# Patient Record
Sex: MALE | Race: BLACK OR AFRICAN AMERICAN | Employment: OTHER | URBAN - METROPOLITAN AREA
[De-identification: names, ages, dates, MRNs, and addresses within clinical notes are randomized per-mention and may not be internally consistent; named-entity substitution may affect disease eponyms.]

---

## 2017-02-01 DIAGNOSIS — I10 ESSENTIAL HYPERTENSION: ICD-10-CM

## 2017-02-02 RX ORDER — METOPROLOL SUCCINATE 50 MG/1
TABLET, EXTENDED RELEASE ORAL
Qty: 30 TABLET | Refills: 3 | Status: SHIPPED | OUTPATIENT
Start: 2017-02-02 | End: 2017-05-30 | Stop reason: SDUPTHER

## 2017-03-28 ENCOUNTER — OFFICE VISIT (OUTPATIENT)
Dept: INTERNAL MEDICINE CLINIC | Age: 82
End: 2017-03-28

## 2017-03-28 VITALS
HEIGHT: 69 IN | TEMPERATURE: 98.1 F | OXYGEN SATURATION: 98 % | HEART RATE: 63 BPM | BODY MASS INDEX: 22.96 KG/M2 | WEIGHT: 155 LBS | SYSTOLIC BLOOD PRESSURE: 106 MMHG | DIASTOLIC BLOOD PRESSURE: 60 MMHG

## 2017-03-28 DIAGNOSIS — I10 ESSENTIAL HYPERTENSION: ICD-10-CM

## 2017-03-28 DIAGNOSIS — Z23 NEED FOR VACCINATION: ICD-10-CM

## 2017-03-28 DIAGNOSIS — K20.90 ESOPHAGITIS: ICD-10-CM

## 2017-03-28 DIAGNOSIS — E78.00 PURE HYPERCHOLESTEROLEMIA: Primary | ICD-10-CM

## 2017-03-28 LAB
A/G RATIO: 1.4 (ref 1.1–2.2)
ALBUMIN SERPL-MCNC: 3.9 G/DL (ref 3.4–5)
ALP BLD-CCNC: 61 U/L (ref 40–129)
ALT SERPL-CCNC: 9 U/L (ref 10–40)
ANION GAP SERPL CALCULATED.3IONS-SCNC: 14 MMOL/L (ref 3–16)
AST SERPL-CCNC: 18 U/L (ref 15–37)
BILIRUB SERPL-MCNC: <0.2 MG/DL (ref 0–1)
BUN BLDV-MCNC: 17 MG/DL (ref 7–20)
CALCIUM SERPL-MCNC: 9.3 MG/DL (ref 8.3–10.6)
CHLORIDE BLD-SCNC: 102 MMOL/L (ref 99–110)
CO2: 26 MMOL/L (ref 21–32)
CREAT SERPL-MCNC: 1.1 MG/DL (ref 0.8–1.3)
GFR AFRICAN AMERICAN: >60
GFR NON-AFRICAN AMERICAN: >60
GLOBULIN: 2.7 G/DL
GLUCOSE BLD-MCNC: 85 MG/DL (ref 70–99)
POTASSIUM SERPL-SCNC: 4.9 MMOL/L (ref 3.5–5.1)
SODIUM BLD-SCNC: 142 MMOL/L (ref 136–145)
TOTAL PROTEIN: 6.6 G/DL (ref 6.4–8.2)

## 2017-03-28 PROCEDURE — G8484 FLU IMMUNIZE NO ADMIN: HCPCS | Performed by: INTERNAL MEDICINE

## 2017-03-28 PROCEDURE — 90670 PCV13 VACCINE IM: CPT | Performed by: INTERNAL MEDICINE

## 2017-03-28 PROCEDURE — 1123F ACP DISCUSS/DSCN MKR DOCD: CPT | Performed by: INTERNAL MEDICINE

## 2017-03-28 PROCEDURE — 4040F PNEUMOC VAC/ADMIN/RCVD: CPT | Performed by: INTERNAL MEDICINE

## 2017-03-28 PROCEDURE — 99214 OFFICE O/P EST MOD 30 MIN: CPT | Performed by: INTERNAL MEDICINE

## 2017-03-28 PROCEDURE — G8419 CALC BMI OUT NRM PARAM NOF/U: HCPCS | Performed by: INTERNAL MEDICINE

## 2017-03-28 PROCEDURE — G8427 DOCREV CUR MEDS BY ELIG CLIN: HCPCS | Performed by: INTERNAL MEDICINE

## 2017-03-28 PROCEDURE — G0009 ADMIN PNEUMOCOCCAL VACCINE: HCPCS | Performed by: INTERNAL MEDICINE

## 2017-03-28 PROCEDURE — 1036F TOBACCO NON-USER: CPT | Performed by: INTERNAL MEDICINE

## 2017-03-28 ASSESSMENT — ENCOUNTER SYMPTOMS
BACK PAIN: 0
EYES NEGATIVE: 1
BLOATING: 0
CONSTIPATION: 1
RESPIRATORY NEGATIVE: 1

## 2017-03-28 ASSESSMENT — PATIENT HEALTH QUESTIONNAIRE - PHQ9
1. LITTLE INTEREST OR PLEASURE IN DOING THINGS: 0
SUM OF ALL RESPONSES TO PHQ9 QUESTIONS 1 & 2: 0
SUM OF ALL RESPONSES TO PHQ QUESTIONS 1-9: 0
2. FEELING DOWN, DEPRESSED OR HOPELESS: 0

## 2017-03-31 PROCEDURE — 90670 PCV13 VACCINE IM: CPT | Performed by: INTERNAL MEDICINE

## 2017-03-31 PROCEDURE — G0009 ADMIN PNEUMOCOCCAL VACCINE: HCPCS | Performed by: INTERNAL MEDICINE

## 2017-04-13 ENCOUNTER — TELEPHONE (OUTPATIENT)
Dept: INTERNAL MEDICINE CLINIC | Age: 82
End: 2017-04-13

## 2017-04-13 DIAGNOSIS — H61.23 BILATERAL IMPACTED CERUMEN: Primary | ICD-10-CM

## 2017-04-24 ENCOUNTER — OFFICE VISIT (OUTPATIENT)
Dept: ENT CLINIC | Age: 82
End: 2017-04-24

## 2017-04-24 VITALS
HEIGHT: 69 IN | TEMPERATURE: 97.7 F | WEIGHT: 153.8 LBS | HEART RATE: 73 BPM | DIASTOLIC BLOOD PRESSURE: 61 MMHG | SYSTOLIC BLOOD PRESSURE: 126 MMHG | BODY MASS INDEX: 22.78 KG/M2

## 2017-04-24 DIAGNOSIS — H90.5 HEARING LOSS, SENSORINEURAL: Primary | ICD-10-CM

## 2017-04-24 PROCEDURE — 1123F ACP DISCUSS/DSCN MKR DOCD: CPT | Performed by: OTOLARYNGOLOGY

## 2017-04-24 PROCEDURE — G8427 DOCREV CUR MEDS BY ELIG CLIN: HCPCS | Performed by: OTOLARYNGOLOGY

## 2017-04-24 PROCEDURE — G8419 CALC BMI OUT NRM PARAM NOF/U: HCPCS | Performed by: OTOLARYNGOLOGY

## 2017-04-24 PROCEDURE — 4040F PNEUMOC VAC/ADMIN/RCVD: CPT | Performed by: OTOLARYNGOLOGY

## 2017-04-24 PROCEDURE — 1036F TOBACCO NON-USER: CPT | Performed by: OTOLARYNGOLOGY

## 2017-04-24 PROCEDURE — 99203 OFFICE O/P NEW LOW 30 MIN: CPT | Performed by: OTOLARYNGOLOGY

## 2017-05-30 DIAGNOSIS — I10 ESSENTIAL HYPERTENSION: ICD-10-CM

## 2017-05-30 RX ORDER — METOPROLOL SUCCINATE 50 MG/1
TABLET, EXTENDED RELEASE ORAL
Qty: 30 TABLET | Refills: 2 | Status: SHIPPED | OUTPATIENT
Start: 2017-05-30 | End: 2017-08-26 | Stop reason: SDUPTHER

## 2017-06-27 ENCOUNTER — OFFICE VISIT (OUTPATIENT)
Dept: INTERNAL MEDICINE CLINIC | Age: 82
End: 2017-06-27

## 2017-06-27 VITALS
HEART RATE: 71 BPM | HEIGHT: 69 IN | WEIGHT: 153 LBS | OXYGEN SATURATION: 97 % | TEMPERATURE: 98.8 F | SYSTOLIC BLOOD PRESSURE: 138 MMHG | BODY MASS INDEX: 22.66 KG/M2 | DIASTOLIC BLOOD PRESSURE: 68 MMHG

## 2017-06-27 DIAGNOSIS — Z23 NEED FOR PROPHYLACTIC VACCINATION AGAINST DIPHTHERIA-TETANUS-PERTUSSIS (DTP): Primary | ICD-10-CM

## 2017-06-27 DIAGNOSIS — E78.00 PURE HYPERCHOLESTEROLEMIA: ICD-10-CM

## 2017-06-27 DIAGNOSIS — I10 ESSENTIAL HYPERTENSION: ICD-10-CM

## 2017-06-27 DIAGNOSIS — I48.0 PAROXYSMAL ATRIAL FIBRILLATION (HCC): ICD-10-CM

## 2017-06-27 LAB
A/G RATIO: 1.3 (ref 1.1–2.2)
ALBUMIN SERPL-MCNC: 4.3 G/DL (ref 3.4–5)
ALP BLD-CCNC: 71 U/L (ref 40–129)
ALT SERPL-CCNC: 10 U/L (ref 10–40)
ANION GAP SERPL CALCULATED.3IONS-SCNC: 12 MMOL/L (ref 3–16)
AST SERPL-CCNC: 18 U/L (ref 15–37)
BILIRUB SERPL-MCNC: <0.2 MG/DL (ref 0–1)
BUN BLDV-MCNC: 17 MG/DL (ref 7–20)
CALCIUM SERPL-MCNC: 10.2 MG/DL (ref 8.3–10.6)
CHLORIDE BLD-SCNC: 99 MMOL/L (ref 99–110)
CHOLESTEROL, TOTAL: 191 MG/DL (ref 0–199)
CO2: 28 MMOL/L (ref 21–32)
CREAT SERPL-MCNC: 1.1 MG/DL (ref 0.8–1.3)
GFR AFRICAN AMERICAN: >60
GFR NON-AFRICAN AMERICAN: >60
GLOBULIN: 3.2 G/DL
GLUCOSE BLD-MCNC: 96 MG/DL (ref 70–99)
HDLC SERPL-MCNC: 81 MG/DL (ref 40–60)
LDL CHOLESTEROL CALCULATED: 96 MG/DL
POTASSIUM SERPL-SCNC: 5.6 MMOL/L (ref 3.5–5.1)
SODIUM BLD-SCNC: 139 MMOL/L (ref 136–145)
TOTAL PROTEIN: 7.5 G/DL (ref 6.4–8.2)
TRIGL SERPL-MCNC: 69 MG/DL (ref 0–150)
VLDLC SERPL CALC-MCNC: 14 MG/DL

## 2017-06-27 PROCEDURE — 90471 IMMUNIZATION ADMIN: CPT | Performed by: INTERNAL MEDICINE

## 2017-06-27 PROCEDURE — G8427 DOCREV CUR MEDS BY ELIG CLIN: HCPCS | Performed by: INTERNAL MEDICINE

## 2017-06-27 PROCEDURE — G8420 CALC BMI NORM PARAMETERS: HCPCS | Performed by: INTERNAL MEDICINE

## 2017-06-27 PROCEDURE — 99214 OFFICE O/P EST MOD 30 MIN: CPT | Performed by: INTERNAL MEDICINE

## 2017-06-27 PROCEDURE — 4040F PNEUMOC VAC/ADMIN/RCVD: CPT | Performed by: INTERNAL MEDICINE

## 2017-06-27 PROCEDURE — 90715 TDAP VACCINE 7 YRS/> IM: CPT | Performed by: INTERNAL MEDICINE

## 2017-06-27 PROCEDURE — 1036F TOBACCO NON-USER: CPT | Performed by: INTERNAL MEDICINE

## 2017-06-27 PROCEDURE — 1123F ACP DISCUSS/DSCN MKR DOCD: CPT | Performed by: INTERNAL MEDICINE

## 2017-06-27 ASSESSMENT — ENCOUNTER SYMPTOMS
EYES NEGATIVE: 1
RESPIRATORY NEGATIVE: 1
CONSTIPATION: 1

## 2017-07-31 ENCOUNTER — HOSPITAL ENCOUNTER (OUTPATIENT)
Dept: NON INVASIVE DIAGNOSTICS | Age: 82
Discharge: HOME | End: 2017-07-31
Attending: INTERNAL MEDICINE | Admitting: INTERNAL MEDICINE

## 2017-07-31 DIAGNOSIS — R06.09 OTHER FORMS OF DYSPNEA: ICD-10-CM

## 2017-07-31 LAB
LV EF: 78 %
LVEF MODALITY: NORMAL

## 2017-08-26 DIAGNOSIS — I10 ESSENTIAL HYPERTENSION: ICD-10-CM

## 2017-08-29 RX ORDER — METOPROLOL SUCCINATE 50 MG/1
TABLET, EXTENDED RELEASE ORAL
Qty: 30 TABLET | Refills: 1 | Status: SHIPPED | OUTPATIENT
Start: 2017-08-29 | End: 2017-10-27 | Stop reason: SDUPTHER

## 2017-09-27 ENCOUNTER — OFFICE VISIT (OUTPATIENT)
Dept: INTERNAL MEDICINE CLINIC | Age: 82
End: 2017-09-27

## 2017-09-27 VITALS
WEIGHT: 154 LBS | DIASTOLIC BLOOD PRESSURE: 72 MMHG | SYSTOLIC BLOOD PRESSURE: 120 MMHG | HEIGHT: 69 IN | OXYGEN SATURATION: 98 % | HEART RATE: 69 BPM | BODY MASS INDEX: 22.81 KG/M2

## 2017-09-27 DIAGNOSIS — Z23 NEED FOR INFLUENZA VACCINATION: Primary | ICD-10-CM

## 2017-09-27 DIAGNOSIS — J98.4 RESTRICTIVE AIRWAY DISEASE: ICD-10-CM

## 2017-09-27 PROCEDURE — 1036F TOBACCO NON-USER: CPT | Performed by: INTERNAL MEDICINE

## 2017-09-27 PROCEDURE — 1123F ACP DISCUSS/DSCN MKR DOCD: CPT | Performed by: INTERNAL MEDICINE

## 2017-09-27 PROCEDURE — 4040F PNEUMOC VAC/ADMIN/RCVD: CPT | Performed by: INTERNAL MEDICINE

## 2017-09-27 PROCEDURE — 99214 OFFICE O/P EST MOD 30 MIN: CPT | Performed by: INTERNAL MEDICINE

## 2017-09-27 PROCEDURE — G8427 DOCREV CUR MEDS BY ELIG CLIN: HCPCS | Performed by: INTERNAL MEDICINE

## 2017-09-27 PROCEDURE — G0008 ADMIN INFLUENZA VIRUS VAC: HCPCS | Performed by: INTERNAL MEDICINE

## 2017-09-27 PROCEDURE — G8420 CALC BMI NORM PARAMETERS: HCPCS | Performed by: INTERNAL MEDICINE

## 2017-09-27 PROCEDURE — 90662 IIV NO PRSV INCREASED AG IM: CPT | Performed by: INTERNAL MEDICINE

## 2017-09-27 ASSESSMENT — ENCOUNTER SYMPTOMS
BLURRED VISION: 0
GASTROINTESTINAL NEGATIVE: 1
EYES NEGATIVE: 1
SHORTNESS OF BREATH: 1

## 2017-10-27 DIAGNOSIS — I10 ESSENTIAL HYPERTENSION: ICD-10-CM

## 2017-10-30 RX ORDER — METOPROLOL SUCCINATE 50 MG/1
TABLET, EXTENDED RELEASE ORAL
Qty: 30 TABLET | Refills: 0 | Status: SHIPPED | OUTPATIENT
Start: 2017-10-30 | End: 2017-11-26 | Stop reason: SDUPTHER

## 2017-11-26 DIAGNOSIS — I10 ESSENTIAL HYPERTENSION: ICD-10-CM

## 2017-11-27 RX ORDER — METOPROLOL SUCCINATE 50 MG/1
TABLET, EXTENDED RELEASE ORAL
Qty: 30 TABLET | Refills: 0 | Status: SHIPPED | OUTPATIENT
Start: 2017-11-27 | End: 2017-12-26 | Stop reason: SDUPTHER

## 2017-12-18 ENCOUNTER — OFFICE VISIT (OUTPATIENT)
Dept: INTERNAL MEDICINE CLINIC | Age: 82
End: 2017-12-18

## 2017-12-18 VITALS
DIASTOLIC BLOOD PRESSURE: 64 MMHG | WEIGHT: 155 LBS | HEART RATE: 75 BPM | BODY MASS INDEX: 22.96 KG/M2 | OXYGEN SATURATION: 97 % | SYSTOLIC BLOOD PRESSURE: 138 MMHG | HEIGHT: 69 IN

## 2017-12-18 DIAGNOSIS — M54.2 NECK PAIN: ICD-10-CM

## 2017-12-18 DIAGNOSIS — J98.4 RESTRICTIVE AIRWAY DISEASE: Primary | ICD-10-CM

## 2017-12-18 DIAGNOSIS — S29.9XXA INJURY OF CHEST WALL, INITIAL ENCOUNTER: ICD-10-CM

## 2017-12-18 LAB
A/G RATIO: 1.6 (ref 1.1–2.2)
ALBUMIN SERPL-MCNC: 4.3 G/DL (ref 3.4–5)
ALP BLD-CCNC: 58 U/L (ref 40–129)
ALT SERPL-CCNC: 13 U/L (ref 10–40)
ANION GAP SERPL CALCULATED.3IONS-SCNC: 14 MMOL/L (ref 3–16)
AST SERPL-CCNC: 24 U/L (ref 15–37)
BILIRUB SERPL-MCNC: 0.5 MG/DL (ref 0–1)
BUN BLDV-MCNC: 16 MG/DL (ref 7–20)
CALCIUM SERPL-MCNC: 9.4 MG/DL (ref 8.3–10.6)
CHLORIDE BLD-SCNC: 100 MMOL/L (ref 99–110)
CO2: 26 MMOL/L (ref 21–32)
CREAT SERPL-MCNC: 1 MG/DL (ref 0.8–1.3)
GFR AFRICAN AMERICAN: >60
GFR NON-AFRICAN AMERICAN: >60
GLOBULIN: 2.7 G/DL
GLUCOSE BLD-MCNC: 100 MG/DL (ref 70–99)
HCT VFR BLD CALC: 32 % (ref 40.5–52.5)
HEMOGLOBIN: 10.7 G/DL (ref 13.5–17.5)
MCH RBC QN AUTO: 31.7 PG (ref 26–34)
MCHC RBC AUTO-ENTMCNC: 33.4 G/DL (ref 31–36)
MCV RBC AUTO: 94.7 FL (ref 80–100)
PDW BLD-RTO: 14.3 % (ref 12.4–15.4)
PLATELET # BLD: 191 K/UL (ref 135–450)
PMV BLD AUTO: 9.2 FL (ref 5–10.5)
POTASSIUM SERPL-SCNC: 5.5 MMOL/L (ref 3.5–5.1)
RBC # BLD: 3.38 M/UL (ref 4.2–5.9)
SODIUM BLD-SCNC: 140 MMOL/L (ref 136–145)
TOTAL PROTEIN: 7 G/DL (ref 6.4–8.2)
WBC # BLD: 6.9 K/UL (ref 4–11)

## 2017-12-18 PROCEDURE — 99214 OFFICE O/P EST MOD 30 MIN: CPT | Performed by: INTERNAL MEDICINE

## 2017-12-18 PROCEDURE — 96372 THER/PROPH/DIAG INJ SC/IM: CPT | Performed by: INTERNAL MEDICINE

## 2017-12-18 PROCEDURE — G8484 FLU IMMUNIZE NO ADMIN: HCPCS | Performed by: INTERNAL MEDICINE

## 2017-12-18 PROCEDURE — 1123F ACP DISCUSS/DSCN MKR DOCD: CPT | Performed by: INTERNAL MEDICINE

## 2017-12-18 PROCEDURE — 1036F TOBACCO NON-USER: CPT | Performed by: INTERNAL MEDICINE

## 2017-12-18 PROCEDURE — G8427 DOCREV CUR MEDS BY ELIG CLIN: HCPCS | Performed by: INTERNAL MEDICINE

## 2017-12-18 PROCEDURE — G8420 CALC BMI NORM PARAMETERS: HCPCS | Performed by: INTERNAL MEDICINE

## 2017-12-18 PROCEDURE — 4040F PNEUMOC VAC/ADMIN/RCVD: CPT | Performed by: INTERNAL MEDICINE

## 2017-12-18 ASSESSMENT — ENCOUNTER SYMPTOMS
EYES NEGATIVE: 1
GASTROINTESTINAL NEGATIVE: 1
CHEST TIGHTNESS: 1

## 2017-12-18 NOTE — PROGRESS NOTES
Subjective:      Patient ID: Radha Isaac is a 80 y.o. male. Chest Pain    This is a new problem. The current episode started in the past 7 days. The problem occurs daily. The problem has been unchanged. The pain is present in the lateral region. The pain is at a severity of 4/10. His past medical history is significant for hyperlipidemia. Hypertension   This is a chronic problem. The problem is controlled. Associated symptoms include chest pain. Past treatments include lifestyle changes. The current treatment provides moderate improvement. There are no compliance problems. Hyperlipidemia   This is a chronic problem. The problem is controlled. Recent lipid tests were reviewed and are normal. Associated symptoms include chest pain. Current antihyperlipidemic treatment includes exercise. The current treatment provides significant improvement of lipids. Review of Systems   Constitutional: Negative. HENT: Negative. Eyes: Negative. Respiratory: Positive for chest tightness. Cardiovascular: Positive for chest pain. Gastrointestinal: Negative. Genitourinary: Negative. Musculoskeletal: Negative. Skin: Negative. Neurological: Negative. Psychiatric/Behavioral: Negative. Objective:   Physical Exam   Constitutional: He is oriented to person, place, and time. He appears well-developed and well-nourished. HENT:   Head: Normocephalic and atraumatic. Left Ear: External ear normal.   Eyes: Conjunctivae and EOM are normal. Pupils are equal, round, and reactive to light. Neck: Normal range of motion. Neck supple. No thyromegaly present. Cardiovascular: Normal rate, regular rhythm and normal heart sounds. No murmur heard. Pulmonary/Chest: Effort normal and breath sounds normal. No respiratory distress. He has no wheezes. He has no rales. Abdominal: Soft. Bowel sounds are normal.   Musculoskeletal: Normal range of motion.    Neurological: He is alert and oriented

## 2018-03-24 DIAGNOSIS — I10 ESSENTIAL HYPERTENSION: ICD-10-CM

## 2018-03-26 ENCOUNTER — TELEPHONE (OUTPATIENT)
Dept: INTERNAL MEDICINE CLINIC | Age: 83
End: 2018-03-26

## 2018-03-26 RX ORDER — METOPROLOL SUCCINATE 50 MG/1
TABLET, EXTENDED RELEASE ORAL
Qty: 90 TABLET | Refills: 2 | Status: ON HOLD | OUTPATIENT
Start: 2018-03-26 | End: 2018-07-07 | Stop reason: HOSPADM

## 2018-04-05 ENCOUNTER — OFFICE VISIT (OUTPATIENT)
Dept: INTERNAL MEDICINE CLINIC | Age: 83
End: 2018-04-05

## 2018-04-05 VITALS
WEIGHT: 152 LBS | HEIGHT: 69 IN | DIASTOLIC BLOOD PRESSURE: 50 MMHG | BODY MASS INDEX: 22.51 KG/M2 | OXYGEN SATURATION: 98 % | HEART RATE: 70 BPM | SYSTOLIC BLOOD PRESSURE: 120 MMHG

## 2018-04-05 DIAGNOSIS — E78.00 PURE HYPERCHOLESTEROLEMIA: ICD-10-CM

## 2018-04-05 DIAGNOSIS — M79.641 BILATERAL HAND PAIN: ICD-10-CM

## 2018-04-05 DIAGNOSIS — I10 ESSENTIAL HYPERTENSION: ICD-10-CM

## 2018-04-05 DIAGNOSIS — D50.0 IRON DEFICIENCY ANEMIA DUE TO CHRONIC BLOOD LOSS: Primary | ICD-10-CM

## 2018-04-05 DIAGNOSIS — C61 PROSTATE CANCER (HCC): ICD-10-CM

## 2018-04-05 DIAGNOSIS — M79.642 BILATERAL HAND PAIN: ICD-10-CM

## 2018-04-05 LAB
A/G RATIO: 1.6 (ref 1.1–2.2)
ALBUMIN SERPL-MCNC: 4.2 G/DL (ref 3.4–5)
ALP BLD-CCNC: 70 U/L (ref 40–129)
ALT SERPL-CCNC: 9 U/L (ref 10–40)
ANION GAP SERPL CALCULATED.3IONS-SCNC: 13 MMOL/L (ref 3–16)
AST SERPL-CCNC: 19 U/L (ref 15–37)
BILIRUB SERPL-MCNC: <0.2 MG/DL (ref 0–1)
BILIRUBIN URINE: NEGATIVE
BLOOD, URINE: NEGATIVE
BUN BLDV-MCNC: 19 MG/DL (ref 7–20)
CALCIUM SERPL-MCNC: 9.1 MG/DL (ref 8.3–10.6)
CHLORIDE BLD-SCNC: 99 MMOL/L (ref 99–110)
CHOLESTEROL, TOTAL: 178 MG/DL (ref 0–199)
CLARITY: CLEAR
CO2: 26 MMOL/L (ref 21–32)
COLOR: YELLOW
CREAT SERPL-MCNC: 1.3 MG/DL (ref 0.8–1.3)
GFR AFRICAN AMERICAN: >60
GFR NON-AFRICAN AMERICAN: 52
GLOBULIN: 2.7 G/DL
GLUCOSE BLD-MCNC: 81 MG/DL (ref 70–99)
GLUCOSE URINE: NEGATIVE MG/DL
HDLC SERPL-MCNC: 73 MG/DL (ref 40–60)
KETONES, URINE: NEGATIVE MG/DL
LDL CHOLESTEROL CALCULATED: 88 MG/DL
LEUKOCYTE ESTERASE, URINE: NEGATIVE
MICROSCOPIC EXAMINATION: NORMAL
NITRITE, URINE: NEGATIVE
PH UA: 7
POTASSIUM SERPL-SCNC: 4.7 MMOL/L (ref 3.5–5.1)
PROTEIN UA: NEGATIVE MG/DL
SODIUM BLD-SCNC: 138 MMOL/L (ref 136–145)
SPECIFIC GRAVITY UA: 1.02
TOTAL PROTEIN: 6.9 G/DL (ref 6.4–8.2)
TRIGL SERPL-MCNC: 83 MG/DL (ref 0–150)
URINE TYPE: NORMAL
UROBILINOGEN, URINE: 0.2 E.U./DL
VLDLC SERPL CALC-MCNC: 17 MG/DL

## 2018-04-05 PROCEDURE — G8420 CALC BMI NORM PARAMETERS: HCPCS | Performed by: INTERNAL MEDICINE

## 2018-04-05 PROCEDURE — 1036F TOBACCO NON-USER: CPT | Performed by: INTERNAL MEDICINE

## 2018-04-05 PROCEDURE — 99214 OFFICE O/P EST MOD 30 MIN: CPT | Performed by: INTERNAL MEDICINE

## 2018-04-05 PROCEDURE — 1123F ACP DISCUSS/DSCN MKR DOCD: CPT | Performed by: INTERNAL MEDICINE

## 2018-04-05 PROCEDURE — G8427 DOCREV CUR MEDS BY ELIG CLIN: HCPCS | Performed by: INTERNAL MEDICINE

## 2018-04-05 PROCEDURE — 4040F PNEUMOC VAC/ADMIN/RCVD: CPT | Performed by: INTERNAL MEDICINE

## 2018-04-05 ASSESSMENT — ENCOUNTER SYMPTOMS
EYES NEGATIVE: 1
GASTROINTESTINAL NEGATIVE: 1
RESPIRATORY NEGATIVE: 1

## 2018-07-06 PROBLEM — R00.1 SYMPTOMATIC BRADYCARDIA: Status: ACTIVE | Noted: 2018-07-06

## 2018-07-08 ENCOUNTER — CARE COORDINATION (OUTPATIENT)
Dept: CASE MANAGEMENT | Age: 83
End: 2018-07-08

## 2018-07-08 DIAGNOSIS — R55 SYNCOPE AND COLLAPSE: Primary | ICD-10-CM

## 2018-07-08 PROCEDURE — 1111F DSCHRG MED/CURRENT MED MERGE: CPT | Performed by: INTERNAL MEDICINE

## 2018-07-08 NOTE — CARE COORDINATION
[facility] Transitions Initial Follow Up Call    Call within 2 business days of discharge: Yes    Patient: Leandro Cates Patient : 1926   MRN: <[id number]>  Reason for Admission: There are no discharge diagnoses documented for the most recent discharge. Discharge Date: 18 RARS: Readmission Risk Score: 9     Spoke with: [address]: WEST    Non-face-to-face services provided:  Obtained and reviewed discharge summary and/or continuity of care documents    Care Transitions 24 Hour Call    Schedule Follow Up Appointment with PCP:  Declined  Do you have any ongoing symptoms?:  No  Do you have a copy of your discharge instructions?:  Yes  Do you have all of your prescriptions and are they filled?:  Yes  Have you been contacted by a [facility] Pharmacist?:  No  Have you scheduled your follow up appointment?:  No  Were you discharged with any Home Care or Post Acute Services:  No  Do you feel like you have everything you need to keep you well at home?:  Yes  Care Transitions Interventions  No Identified Needs       Pt states doing well, no issues or concerns. No further episodes of lightheadness or falling. Reviewed all meds.  Pt states he will make his own f/u appt, politely declined this nurse's assistance  Agreed to more CTC f/u calls      Follow Up  Future Appointments  Date Time Provider Sue Li   2018 9:40 AM MD Carole Sharma RN

## 2018-07-09 ENCOUNTER — OFFICE VISIT (OUTPATIENT)
Dept: CARDIOLOGY CLINIC | Age: 83
End: 2018-07-09

## 2018-07-09 ENCOUNTER — TELEPHONE (OUTPATIENT)
Dept: CARDIOLOGY CLINIC | Age: 83
End: 2018-07-09

## 2018-07-09 VITALS
WEIGHT: 152 LBS | SYSTOLIC BLOOD PRESSURE: 100 MMHG | DIASTOLIC BLOOD PRESSURE: 58 MMHG | HEART RATE: 78 BPM | OXYGEN SATURATION: 98 % | HEIGHT: 69 IN | BODY MASS INDEX: 22.51 KG/M2

## 2018-07-09 DIAGNOSIS — I10 ESSENTIAL HYPERTENSION: ICD-10-CM

## 2018-07-09 DIAGNOSIS — R55 SYNCOPE AND COLLAPSE: ICD-10-CM

## 2018-07-09 DIAGNOSIS — E78.2 MIXED HYPERLIPIDEMIA: ICD-10-CM

## 2018-07-09 DIAGNOSIS — R00.1 SYMPTOMATIC BRADYCARDIA: Primary | ICD-10-CM

## 2018-07-09 PROCEDURE — 1111F DSCHRG MED/CURRENT MED MERGE: CPT | Performed by: INTERNAL MEDICINE

## 2018-07-09 PROCEDURE — 4040F PNEUMOC VAC/ADMIN/RCVD: CPT | Performed by: INTERNAL MEDICINE

## 2018-07-09 PROCEDURE — 1036F TOBACCO NON-USER: CPT | Performed by: INTERNAL MEDICINE

## 2018-07-09 PROCEDURE — G8427 DOCREV CUR MEDS BY ELIG CLIN: HCPCS | Performed by: INTERNAL MEDICINE

## 2018-07-09 PROCEDURE — 1123F ACP DISCUSS/DSCN MKR DOCD: CPT | Performed by: INTERNAL MEDICINE

## 2018-07-09 PROCEDURE — 99214 OFFICE O/P EST MOD 30 MIN: CPT | Performed by: INTERNAL MEDICINE

## 2018-07-09 PROCEDURE — G8420 CALC BMI NORM PARAMETERS: HCPCS | Performed by: INTERNAL MEDICINE

## 2018-07-09 PROCEDURE — 93000 ELECTROCARDIOGRAM COMPLETE: CPT | Performed by: INTERNAL MEDICINE

## 2018-07-09 NOTE — PATIENT INSTRUCTIONS
about \"Lightheadedness or Faintness: Care Instructions. \"     If you do not have an account, please click on the \"Sign Up Now\" link. Current as of: November 20, 2017  Content Version: 11.6  © 1901-7648 [employer]. Care instructions adapted under license by [facility] ([facility]). If you have questions about a medical condition or this instruction, always ask your healthcare professional. [address] any warranty or liability for your use of this information. Patient Education        Atrial Fibrillation: Care Instructions  Your Care Instructions    Atrial fibrillation is an irregular and often fast heartbeat. Treating this condition is important for several reasons. It can cause blood clots, which can travel from your heart to your brain and cause a stroke. If you have a fast heartbeat, you may feel lightheaded, dizzy, and weak. An irregular heartbeat can also increase your risk for heart failure. Atrial fibrillation is often the result of another heart condition, such as high blood pressure or coronary artery disease. Making changes to improve your heart condition will help you stay healthy and active. Follow-up care is a key part of your treatment and safety. Be sure to make and go to all appointments, and call your doctor if you are having problems. It's also a good idea to know your test results and keep a list of the medicines you take. How can you care for yourself at home? Medicines    · Take your medicines exactly as prescribed. Call your doctor if you think you are having a problem with your medicine. You will get more details on the specific medicines your doctor prescribes.     · If your doctor has given you a blood thinner to prevent a stroke, be sure you get instructions about how to take your medicine safely.  Blood thinners can cause serious bleeding problems.     · Do not take any vitamins, over-the-counter drugs, or herbal products without talking to your doctor professional. [address] any warranty or liability for your use of this information.

## 2018-07-09 NOTE — TELEPHONE ENCOUNTER
Ivan's wife Aftab Frias (ok per hippa) stating Diallo Bender was at the ER over the weekend for syncope-collapse and bradycardia and wants Diallo Bender to see Dr. Niels Cervatnes which is her doctor. Dr. Trey Kay next available appt is not until Sept 10th. Where can I put a new patient on Dr. Trey Kay schedule?       On a side note Aftab Frias also states while she was with Diallo Bender at the ER she was having SOB and I have her scheduled to see Ryan Chino today at 1:40pm.    Diallo Bender can be reached at [phone] up until 10am today and we may leave a message with day and time of when Dr. Niels Cervantes would like to see him in the office

## 2018-07-19 ENCOUNTER — OFFICE VISIT (OUTPATIENT)
Dept: INTERNAL MEDICINE CLINIC | Age: 83
End: 2018-07-19

## 2018-07-19 VITALS
HEIGHT: 69 IN | DIASTOLIC BLOOD PRESSURE: 60 MMHG | WEIGHT: 153 LBS | HEART RATE: 70 BPM | SYSTOLIC BLOOD PRESSURE: 110 MMHG | BODY MASS INDEX: 22.66 KG/M2 | OXYGEN SATURATION: 98 %

## 2018-07-19 DIAGNOSIS — R55 SYNCOPE, UNSPECIFIED SYNCOPE TYPE: Primary | ICD-10-CM

## 2018-07-19 DIAGNOSIS — R55 SYNCOPE AND COLLAPSE: ICD-10-CM

## 2018-07-19 DIAGNOSIS — R00.1 SYMPTOMATIC BRADYCARDIA: ICD-10-CM

## 2018-07-19 DIAGNOSIS — I10 ESSENTIAL HYPERTENSION: ICD-10-CM

## 2018-07-19 PROCEDURE — 1111F DSCHRG MED/CURRENT MED MERGE: CPT | Performed by: INTERNAL MEDICINE

## 2018-07-19 PROCEDURE — 99495 TRANSJ CARE MGMT MOD F2F 14D: CPT | Performed by: INTERNAL MEDICINE

## 2018-07-19 ASSESSMENT — PATIENT HEALTH QUESTIONNAIRE - PHQ9
SUM OF ALL RESPONSES TO PHQ QUESTIONS 1-9: 0
SUM OF ALL RESPONSES TO PHQ9 QUESTIONS 1 & 2: 0
1. LITTLE INTEREST OR PLEASURE IN DOING THINGS: 0
2. FEELING DOWN, DEPRESSED OR HOPELESS: 0

## 2018-07-19 ASSESSMENT — ENCOUNTER SYMPTOMS
EYES NEGATIVE: 1
GASTROINTESTINAL NEGATIVE: 1
RESPIRATORY NEGATIVE: 1

## 2018-07-20 ENCOUNTER — CARE COORDINATION (OUTPATIENT)
Dept: CASE MANAGEMENT | Age: 83
End: 2018-07-20

## 2018-08-09 ENCOUNTER — OFFICE VISIT (OUTPATIENT)
Dept: CARDIOLOGY CLINIC | Age: 83
End: 2018-08-09

## 2018-08-09 VITALS
HEART RATE: 78 BPM | WEIGHT: 153 LBS | SYSTOLIC BLOOD PRESSURE: 130 MMHG | BODY MASS INDEX: 22.66 KG/M2 | HEIGHT: 69 IN | OXYGEN SATURATION: 96 % | DIASTOLIC BLOOD PRESSURE: 60 MMHG

## 2018-08-09 DIAGNOSIS — E78.2 MIXED HYPERLIPIDEMIA: ICD-10-CM

## 2018-08-09 DIAGNOSIS — R55 SYNCOPE AND COLLAPSE: Primary | ICD-10-CM

## 2018-08-09 DIAGNOSIS — I48.0 PAROXYSMAL ATRIAL FIBRILLATION (HCC): ICD-10-CM

## 2018-08-09 PROCEDURE — 99214 OFFICE O/P EST MOD 30 MIN: CPT | Performed by: INTERNAL MEDICINE

## 2018-08-09 PROCEDURE — 4040F PNEUMOC VAC/ADMIN/RCVD: CPT | Performed by: INTERNAL MEDICINE

## 2018-08-09 PROCEDURE — 1101F PT FALLS ASSESS-DOCD LE1/YR: CPT | Performed by: INTERNAL MEDICINE

## 2018-08-09 PROCEDURE — G8420 CALC BMI NORM PARAMETERS: HCPCS | Performed by: INTERNAL MEDICINE

## 2018-08-09 PROCEDURE — 1036F TOBACCO NON-USER: CPT | Performed by: INTERNAL MEDICINE

## 2018-08-09 PROCEDURE — 1123F ACP DISCUSS/DSCN MKR DOCD: CPT | Performed by: INTERNAL MEDICINE

## 2018-08-09 PROCEDURE — 93000 ELECTROCARDIOGRAM COMPLETE: CPT | Performed by: INTERNAL MEDICINE

## 2018-08-09 PROCEDURE — G8427 DOCREV CUR MEDS BY ELIG CLIN: HCPCS | Performed by: INTERNAL MEDICINE

## 2018-08-09 NOTE — PROGRESS NOTES
of Onset    Hypertension Other     Stroke Other       Social History   Substance Use Topics    Smoking status: Former Smoker     Quit date: 9/17/1955    Smokeless tobacco: Never Used    Alcohol use No     No Known Allergies      Review of Systems -   Constitutional: Negative for weight gain/loss; malaise, fever  Respiratory: Negative for Asthma;  cough and hemoptysis  Cardiovascular: Negative for palpitations,dizziness   Gastrointestinal: Negative for abd.pain; constipation/diarrhea;    Genitourinary: Negative for stones; hematuria; frequency hesitancy  Integumentt: Negative for rash or pruritis  Hematologic/lymphatic: Negative for blood dyscrasia; leukemia/lymphoma  Musculoskeletal: Negative for Connective tissue disease  Neurological:  Negative for Seizure   Behavioral/Psych:Negative for Bipolar disorder, Schizophrenia; Dementia  Endocrine: negative for thyroid, parathyroid disease    Physical Examination:    /60 (Site: Left Arm, Position: Sitting)   Pulse 78   Ht 5' 9\" (1.753 m)   Wt 153 lb (69.4 kg)   SpO2 96%   BMI 22.59 kg/m²    HEENT:  Face: Atraumatic, Conjunctiva: Pink; non icteric,  Mucous Memb:  Moist, No thyromegaly or Lymphadenopathy  Respiratory:  Resp Assessment: WNL, Resp Auscultation: Normal  Cardiovascular: Auscultation: nl S1 & S2, Palpation:  Nl PMI;  No heaves or thrills, JVP:  normal  Abdomen: Soft, non-tender, Normal bowel sounds,  No organomegaly  Extremities: No Cyanosis or Clubbing  Neurological: Oriented to time, place, and person, Non-anxious  Psychiatric: Normal mood and affect  Skin: Warm and dry,  No rash seen     Current Outpatient Prescriptions   Medication Sig Dispense Refill    apixaban (ELIQUIS) 2.5 MG TABS tablet Take 1 tablet by mouth 2 times daily 60 tablet 5    magnesium hydroxide (MILK OF MAGNESIA) 400 MG/5ML suspension Take 30 mLs by mouth daily as needed for Constipation 1 Bottle 0    albuterol sulfate  (90 BASE) MCG/ACT inhaler Inhale 2 puffs into

## 2018-10-22 ENCOUNTER — OFFICE VISIT (OUTPATIENT)
Dept: INTERNAL MEDICINE CLINIC | Age: 83
End: 2018-10-22
Payer: MEDICARE

## 2018-10-22 VITALS
HEIGHT: 69 IN | DIASTOLIC BLOOD PRESSURE: 60 MMHG | BODY MASS INDEX: 22.81 KG/M2 | SYSTOLIC BLOOD PRESSURE: 110 MMHG | OXYGEN SATURATION: 97 % | WEIGHT: 154 LBS | HEART RATE: 65 BPM

## 2018-10-22 DIAGNOSIS — C61 PROSTATE CANCER (HCC): Primary | ICD-10-CM

## 2018-10-22 DIAGNOSIS — E78.00 PURE HYPERCHOLESTEROLEMIA: ICD-10-CM

## 2018-10-22 DIAGNOSIS — I10 ESSENTIAL HYPERTENSION: ICD-10-CM

## 2018-10-22 LAB
A/G RATIO: 1.4 (ref 1.1–2.2)
ALBUMIN SERPL-MCNC: 4.2 G/DL (ref 3.4–5)
ALP BLD-CCNC: 76 U/L (ref 40–129)
ALT SERPL-CCNC: 11 U/L (ref 10–40)
ANION GAP SERPL CALCULATED.3IONS-SCNC: 14 MMOL/L (ref 3–16)
AST SERPL-CCNC: 23 U/L (ref 15–37)
BILIRUB SERPL-MCNC: <0.2 MG/DL (ref 0–1)
BUN BLDV-MCNC: 21 MG/DL (ref 7–20)
CALCIUM SERPL-MCNC: 9.6 MG/DL (ref 8.3–10.6)
CHLORIDE BLD-SCNC: 100 MMOL/L (ref 99–110)
CHOLESTEROL, TOTAL: 174 MG/DL (ref 0–199)
CO2: 26 MMOL/L (ref 21–32)
CREAT SERPL-MCNC: 1.1 MG/DL (ref 0.8–1.3)
GFR AFRICAN AMERICAN: >60
GFR NON-AFRICAN AMERICAN: >60
GLOBULIN: 2.9 G/DL
GLUCOSE BLD-MCNC: 88 MG/DL (ref 70–99)
HDLC SERPL-MCNC: 82 MG/DL (ref 40–60)
LDL CHOLESTEROL CALCULATED: 81 MG/DL
POTASSIUM SERPL-SCNC: 5 MMOL/L (ref 3.5–5.1)
SODIUM BLD-SCNC: 140 MMOL/L (ref 136–145)
TOTAL PROTEIN: 7.1 G/DL (ref 6.4–8.2)
TRIGL SERPL-MCNC: 56 MG/DL (ref 0–150)
VLDLC SERPL CALC-MCNC: 11 MG/DL

## 2018-10-22 PROCEDURE — G0008 ADMIN INFLUENZA VIRUS VAC: HCPCS | Performed by: INTERNAL MEDICINE

## 2018-10-22 PROCEDURE — 1036F TOBACCO NON-USER: CPT | Performed by: INTERNAL MEDICINE

## 2018-10-22 PROCEDURE — 1123F ACP DISCUSS/DSCN MKR DOCD: CPT | Performed by: INTERNAL MEDICINE

## 2018-10-22 PROCEDURE — G8482 FLU IMMUNIZE ORDER/ADMIN: HCPCS | Performed by: INTERNAL MEDICINE

## 2018-10-22 PROCEDURE — 90662 IIV NO PRSV INCREASED AG IM: CPT | Performed by: INTERNAL MEDICINE

## 2018-10-22 PROCEDURE — 1101F PT FALLS ASSESS-DOCD LE1/YR: CPT | Performed by: INTERNAL MEDICINE

## 2018-10-22 PROCEDURE — G8420 CALC BMI NORM PARAMETERS: HCPCS | Performed by: INTERNAL MEDICINE

## 2018-10-22 PROCEDURE — 4040F PNEUMOC VAC/ADMIN/RCVD: CPT | Performed by: INTERNAL MEDICINE

## 2018-10-22 PROCEDURE — G8427 DOCREV CUR MEDS BY ELIG CLIN: HCPCS | Performed by: INTERNAL MEDICINE

## 2018-10-22 PROCEDURE — 99214 OFFICE O/P EST MOD 30 MIN: CPT | Performed by: INTERNAL MEDICINE

## 2018-10-22 ASSESSMENT — ENCOUNTER SYMPTOMS
EYES NEGATIVE: 1
SHORTNESS OF BREATH: 1
GASTROINTESTINAL NEGATIVE: 1

## 2018-10-22 NOTE — PROGRESS NOTES
Vaccine Information Sheet, \"Influenza - Inactivated\"  given to Adrian Schilling, or parent/legal guardian of  Adrian Schilling and verbalized understanding. Patient responses:    Have you ever had a reaction to a flu vaccine? No  Are you able to eat eggs without adverse effects? Yes  Do you have any current illness? No  Have you ever had Guillian Rainier Syndrome? No    Flu vaccine given per order. Please see immunization tab.

## 2019-01-02 RX ORDER — APIXABAN 2.5 MG/1
TABLET, FILM COATED ORAL
Qty: 60 TABLET | Refills: 4 | Status: SHIPPED | OUTPATIENT
Start: 2019-01-02 | End: 2019-07-17 | Stop reason: SDUPTHER

## 2019-01-18 ENCOUNTER — APPOINTMENT (OUTPATIENT)
Dept: CT IMAGING | Age: 84
End: 2019-01-18
Attending: INTERNAL MEDICINE
Payer: MEDICARE

## 2019-01-18 ENCOUNTER — HOSPITAL ENCOUNTER (OUTPATIENT)
Age: 84
Setting detail: OBSERVATION
Discharge: HOME | End: 2019-01-18
Attending: INTERNAL MEDICINE | Admitting: INTERNAL MEDICINE
Payer: MEDICARE

## 2019-01-18 ENCOUNTER — HOSPITAL ENCOUNTER (EMERGENCY)
Age: 84
Discharge: ANOTHER HOSPITAL | End: 2019-01-18
Attending: EMERGENCY MEDICINE
Payer: MEDICARE

## 2019-01-18 ENCOUNTER — APPOINTMENT (OUTPATIENT)
Dept: GENERAL RADIOLOGY | Age: 84
End: 2019-01-18
Payer: MEDICARE

## 2019-01-18 ENCOUNTER — APPOINTMENT (OUTPATIENT)
Dept: CT IMAGING | Age: 84
End: 2019-01-18
Payer: MEDICARE

## 2019-01-18 VITALS
HEART RATE: 76 BPM | HEIGHT: 69 IN | OXYGEN SATURATION: 97 % | TEMPERATURE: 97.1 F | SYSTOLIC BLOOD PRESSURE: 148 MMHG | BODY MASS INDEX: 21.52 KG/M2 | WEIGHT: 145.28 LBS | DIASTOLIC BLOOD PRESSURE: 69 MMHG | RESPIRATION RATE: 16 BRPM

## 2019-01-18 VITALS
DIASTOLIC BLOOD PRESSURE: 82 MMHG | TEMPERATURE: 98.2 F | OXYGEN SATURATION: 98 % | SYSTOLIC BLOOD PRESSURE: 155 MMHG | HEART RATE: 73 BPM | WEIGHT: 156.75 LBS | RESPIRATION RATE: 15 BRPM | BODY MASS INDEX: 23.15 KG/M2

## 2019-01-18 DIAGNOSIS — S06.5XAA SUBDURAL HEMATOMA: Primary | ICD-10-CM

## 2019-01-18 DIAGNOSIS — R55 SYNCOPE AND COLLAPSE: ICD-10-CM

## 2019-01-18 LAB
A/G RATIO: 1.2 (ref 1.1–2.2)
ALBUMIN SERPL-MCNC: 4.3 G/DL (ref 3.4–5)
ALP BLD-CCNC: 85 U/L (ref 40–129)
ALT SERPL-CCNC: 9 U/L (ref 10–40)
ANION GAP SERPL CALCULATED.3IONS-SCNC: 12 MMOL/L (ref 3–16)
AST SERPL-CCNC: 21 U/L (ref 15–37)
BASOPHILS ABSOLUTE: 0 K/UL (ref 0–0.2)
BASOPHILS RELATIVE PERCENT: 0.8 %
BILIRUB SERPL-MCNC: <0.2 MG/DL (ref 0–1)
BILIRUBIN URINE: NEGATIVE
BLOOD, URINE: NEGATIVE
BUN BLDV-MCNC: 19 MG/DL (ref 7–20)
CALCIUM SERPL-MCNC: 9.5 MG/DL (ref 8.3–10.6)
CHLORIDE BLD-SCNC: 102 MMOL/L (ref 99–110)
CLARITY: CLEAR
CO2: 27 MMOL/L (ref 21–32)
COLOR: YELLOW
CREAT SERPL-MCNC: 1.1 MG/DL (ref 0.8–1.3)
EKG ATRIAL RATE: 77 BPM
EKG DIAGNOSIS: NORMAL
EKG P AXIS: 53 DEGREES
EKG P-R INTERVAL: 210 MS
EKG Q-T INTERVAL: 424 MS
EKG QRS DURATION: 80 MS
EKG QTC CALCULATION (BAZETT): 479 MS
EKG R AXIS: -31 DEGREES
EKG T AXIS: 65 DEGREES
EKG VENTRICULAR RATE: 77 BPM
EOSINOPHILS ABSOLUTE: 0.1 K/UL (ref 0–0.6)
EOSINOPHILS RELATIVE PERCENT: 2.4 %
GFR AFRICAN AMERICAN: >60
GFR NON-AFRICAN AMERICAN: >60
GLOBULIN: 3.5 G/DL
GLUCOSE BLD-MCNC: 115 MG/DL (ref 70–99)
GLUCOSE URINE: NEGATIVE MG/DL
HCT VFR BLD CALC: 33.8 % (ref 40.5–52.5)
HEMOGLOBIN: 11.1 G/DL (ref 13.5–17.5)
KETONES, URINE: NEGATIVE MG/DL
LEUKOCYTE ESTERASE, URINE: NEGATIVE
LYMPHOCYTES ABSOLUTE: 2.9 K/UL (ref 1–5.1)
LYMPHOCYTES RELATIVE PERCENT: 46.1 %
MCH RBC QN AUTO: 31.1 PG (ref 26–34)
MCHC RBC AUTO-ENTMCNC: 33 G/DL (ref 31–36)
MCV RBC AUTO: 94.3 FL (ref 80–100)
MICROSCOPIC EXAMINATION: NORMAL
MONOCYTES ABSOLUTE: 0.7 K/UL (ref 0–1.3)
MONOCYTES RELATIVE PERCENT: 10.6 %
NEUTROPHILS ABSOLUTE: 2.5 K/UL (ref 1.7–7.7)
NEUTROPHILS RELATIVE PERCENT: 40.1 %
NITRITE, URINE: NEGATIVE
PDW BLD-RTO: 14.3 % (ref 12.4–15.4)
PH UA: 7.5
PLATELET # BLD: 210 K/UL (ref 135–450)
PMV BLD AUTO: 8.5 FL (ref 5–10.5)
POTASSIUM REFLEX MAGNESIUM: 4.3 MMOL/L (ref 3.5–5.1)
PROTEIN UA: NEGATIVE MG/DL
RBC # BLD: 3.58 M/UL (ref 4.2–5.9)
SODIUM BLD-SCNC: 141 MMOL/L (ref 136–145)
SPECIFIC GRAVITY UA: 1.01
TOTAL PROTEIN: 7.8 G/DL (ref 6.4–8.2)
TROPONIN: <0.01 NG/ML
URINE REFLEX TO CULTURE: NORMAL
URINE TYPE: NORMAL
UROBILINOGEN, URINE: 0.2 E.U./DL
WBC # BLD: 6.2 K/UL (ref 4–11)

## 2019-01-18 PROCEDURE — 94761 N-INVAS EAR/PLS OXIMETRY MLT: CPT

## 2019-01-18 PROCEDURE — 6370000000 HC RX 637 (ALT 250 FOR IP): Performed by: PHYSICIAN ASSISTANT

## 2019-01-18 PROCEDURE — 94150 VITAL CAPACITY TEST: CPT

## 2019-01-18 PROCEDURE — 85025 COMPLETE CBC W/AUTO DIFF WBC: CPT

## 2019-01-18 PROCEDURE — 70450 CT HEAD/BRAIN W/O DYE: CPT

## 2019-01-18 PROCEDURE — 81003 URINALYSIS AUTO W/O SCOPE: CPT

## 2019-01-18 PROCEDURE — 93010 ELECTROCARDIOGRAM REPORT: CPT | Performed by: INTERNAL MEDICINE

## 2019-01-18 PROCEDURE — 71045 X-RAY EXAM CHEST 1 VIEW: CPT

## 2019-01-18 PROCEDURE — G0378 HOSPITAL OBSERVATION PER HR: HCPCS

## 2019-01-18 PROCEDURE — 2580000003 HC RX 258: Performed by: PHYSICIAN ASSISTANT

## 2019-01-18 PROCEDURE — G0379 DIRECT REFER HOSPITAL OBSERV: HCPCS

## 2019-01-18 PROCEDURE — 96360 HYDRATION IV INFUSION INIT: CPT

## 2019-01-18 PROCEDURE — 80053 COMPREHEN METABOLIC PANEL: CPT

## 2019-01-18 PROCEDURE — 99285 EMERGENCY DEPT VISIT HI MDM: CPT

## 2019-01-18 PROCEDURE — 94664 DEMO&/EVAL PT USE INHALER: CPT

## 2019-01-18 PROCEDURE — 93005 ELECTROCARDIOGRAM TRACING: CPT | Performed by: PHYSICIAN ASSISTANT

## 2019-01-18 PROCEDURE — 84484 ASSAY OF TROPONIN QUANT: CPT

## 2019-01-18 RX ORDER — ONDANSETRON 2 MG/ML
4 INJECTION INTRAMUSCULAR; INTRAVENOUS EVERY 30 MIN PRN
Status: DISCONTINUED | OUTPATIENT
Start: 2019-01-18 | End: 2019-01-18 | Stop reason: HOSPADM

## 2019-01-18 RX ORDER — PRAVASTATIN SODIUM 40 MG
40 TABLET ORAL NIGHTLY
Status: DISCONTINUED | OUTPATIENT
Start: 2019-01-18 | End: 2019-01-18 | Stop reason: HOSPADM

## 2019-01-18 RX ORDER — SODIUM CHLORIDE 0.9 % (FLUSH) 0.9 %
10 SYRINGE (ML) INJECTION PRN
Status: DISCONTINUED | OUTPATIENT
Start: 2019-01-18 | End: 2019-01-18 | Stop reason: HOSPADM

## 2019-01-18 RX ORDER — ALBUTEROL SULFATE 2.5 MG/3ML
2.5 SOLUTION RESPIRATORY (INHALATION) EVERY 6 HOURS PRN
Status: DISCONTINUED | OUTPATIENT
Start: 2019-01-18 | End: 2019-01-18 | Stop reason: HOSPADM

## 2019-01-18 RX ORDER — MECLIZINE HCL 12.5 MG/1
25 TABLET ORAL ONCE
Status: COMPLETED | OUTPATIENT
Start: 2019-01-18 | End: 2019-01-18

## 2019-01-18 RX ORDER — SODIUM CHLORIDE 0.9 % (FLUSH) 0.9 %
10 SYRINGE (ML) INJECTION EVERY 12 HOURS SCHEDULED
Status: DISCONTINUED | OUTPATIENT
Start: 2019-01-18 | End: 2019-01-18 | Stop reason: HOSPADM

## 2019-01-18 RX ORDER — SODIUM CHLORIDE, SODIUM LACTATE, POTASSIUM CHLORIDE, CALCIUM CHLORIDE 600; 310; 30; 20 MG/100ML; MG/100ML; MG/100ML; MG/100ML
500 INJECTION, SOLUTION INTRAVENOUS ONCE
Status: COMPLETED | OUTPATIENT
Start: 2019-01-18 | End: 2019-01-18

## 2019-01-18 RX ADMIN — SODIUM CHLORIDE, POTASSIUM CHLORIDE, SODIUM LACTATE AND CALCIUM CHLORIDE 500 ML: 600; 310; 30; 20 INJECTION, SOLUTION INTRAVENOUS at 06:39

## 2019-01-18 RX ADMIN — MECLIZINE 25 MG: 12.5 TABLET ORAL at 06:37

## 2019-01-18 ASSESSMENT — PAIN SCALES - GENERAL: PAINLEVEL_OUTOF10: 0

## 2019-01-21 LAB
PROSTATE SPECIFIC ANTIGEN FREE: <0.1 UG/L
PROSTATE SPECIFIC ANTIGEN PERCENT FREE: NORMAL %
PROSTATE SPECIFIC ANTIGEN: <0.1 UG/L (ref 0–4)

## 2019-01-22 ENCOUNTER — OFFICE VISIT (OUTPATIENT)
Dept: INTERNAL MEDICINE CLINIC | Age: 84
End: 2019-01-22
Payer: MEDICARE

## 2019-01-22 VITALS
SYSTOLIC BLOOD PRESSURE: 120 MMHG | BODY MASS INDEX: 21.48 KG/M2 | HEIGHT: 69 IN | OXYGEN SATURATION: 97 % | DIASTOLIC BLOOD PRESSURE: 56 MMHG | WEIGHT: 145 LBS | HEART RATE: 87 BPM

## 2019-01-22 DIAGNOSIS — R55 VASOVAGAL SYNCOPE: ICD-10-CM

## 2019-01-22 DIAGNOSIS — I48.0 PAROXYSMAL ATRIAL FIBRILLATION (HCC): Primary | ICD-10-CM

## 2019-01-22 DIAGNOSIS — R55 SYNCOPE AND COLLAPSE: ICD-10-CM

## 2019-01-22 PROCEDURE — 99496 TRANSJ CARE MGMT HIGH F2F 7D: CPT | Performed by: INTERNAL MEDICINE

## 2019-01-22 ASSESSMENT — ENCOUNTER SYMPTOMS
GASTROINTESTINAL NEGATIVE: 1
EYES NEGATIVE: 1
RESPIRATORY NEGATIVE: 1

## 2019-01-22 ASSESSMENT — PATIENT HEALTH QUESTIONNAIRE - PHQ9
2. FEELING DOWN, DEPRESSED OR HOPELESS: 0
SUM OF ALL RESPONSES TO PHQ9 QUESTIONS 1 & 2: 0
SUM OF ALL RESPONSES TO PHQ QUESTIONS 1-9: 0
SUM OF ALL RESPONSES TO PHQ QUESTIONS 1-9: 0
1. LITTLE INTEREST OR PLEASURE IN DOING THINGS: 0

## 2019-01-23 ENCOUNTER — OFFICE VISIT (OUTPATIENT)
Dept: CARDIOLOGY CLINIC | Age: 84
End: 2019-01-23
Payer: MEDICARE

## 2019-01-23 ENCOUNTER — TELEPHONE (OUTPATIENT)
Dept: CARDIOLOGY CLINIC | Age: 84
End: 2019-01-23

## 2019-01-23 VITALS
DIASTOLIC BLOOD PRESSURE: 64 MMHG | HEIGHT: 69 IN | OXYGEN SATURATION: 97 % | SYSTOLIC BLOOD PRESSURE: 140 MMHG | HEART RATE: 83 BPM | WEIGHT: 156 LBS | BODY MASS INDEX: 23.11 KG/M2

## 2019-01-23 DIAGNOSIS — R55 SYNCOPE, UNSPECIFIED SYNCOPE TYPE: ICD-10-CM

## 2019-01-23 DIAGNOSIS — E78.2 MIXED HYPERLIPIDEMIA: ICD-10-CM

## 2019-01-23 DIAGNOSIS — I48.0 PAROXYSMAL ATRIAL FIBRILLATION (HCC): Primary | ICD-10-CM

## 2019-01-23 PROCEDURE — 99214 OFFICE O/P EST MOD 30 MIN: CPT | Performed by: INTERNAL MEDICINE

## 2019-01-23 PROCEDURE — G8420 CALC BMI NORM PARAMETERS: HCPCS | Performed by: INTERNAL MEDICINE

## 2019-01-23 PROCEDURE — 93000 ELECTROCARDIOGRAM COMPLETE: CPT | Performed by: INTERNAL MEDICINE

## 2019-01-23 PROCEDURE — G8482 FLU IMMUNIZE ORDER/ADMIN: HCPCS | Performed by: INTERNAL MEDICINE

## 2019-01-23 PROCEDURE — 4040F PNEUMOC VAC/ADMIN/RCVD: CPT | Performed by: INTERNAL MEDICINE

## 2019-01-23 PROCEDURE — G8427 DOCREV CUR MEDS BY ELIG CLIN: HCPCS | Performed by: INTERNAL MEDICINE

## 2019-01-23 PROCEDURE — 1123F ACP DISCUSS/DSCN MKR DOCD: CPT | Performed by: INTERNAL MEDICINE

## 2019-01-23 PROCEDURE — 1101F PT FALLS ASSESS-DOCD LE1/YR: CPT | Performed by: INTERNAL MEDICINE

## 2019-01-23 PROCEDURE — 1036F TOBACCO NON-USER: CPT | Performed by: INTERNAL MEDICINE

## 2019-02-13 ENCOUNTER — TELEPHONE (OUTPATIENT)
Dept: CARDIOLOGY CLINIC | Age: 84
End: 2019-02-13

## 2019-02-13 ENCOUNTER — TELEPHONE (OUTPATIENT)
Dept: INTERNAL MEDICINE CLINIC | Age: 84
End: 2019-02-13

## 2019-02-13 DIAGNOSIS — E78.00 PURE HYPERCHOLESTEROLEMIA: ICD-10-CM

## 2019-02-13 RX ORDER — PRAVASTATIN SODIUM 40 MG
TABLET ORAL
Qty: 30 TABLET | Refills: 4 | Status: SHIPPED | OUTPATIENT
Start: 2019-02-13 | End: 2019-07-11 | Stop reason: SDUPTHER

## 2019-03-08 PROCEDURE — 93228 REMOTE 30 DAY ECG REV/REPORT: CPT | Performed by: INTERNAL MEDICINE

## 2019-03-18 DIAGNOSIS — R55 SYNCOPE, UNSPECIFIED SYNCOPE TYPE: ICD-10-CM

## 2019-03-26 ENCOUNTER — OFFICE VISIT (OUTPATIENT)
Dept: CARDIOLOGY CLINIC | Age: 84
End: 2019-03-26
Payer: MEDICARE

## 2019-03-26 VITALS
DIASTOLIC BLOOD PRESSURE: 70 MMHG | OXYGEN SATURATION: 97 % | SYSTOLIC BLOOD PRESSURE: 140 MMHG | WEIGHT: 153 LBS | BODY MASS INDEX: 22.66 KG/M2 | HEART RATE: 78 BPM | HEIGHT: 69 IN

## 2019-03-26 DIAGNOSIS — I51.89 DYNAMIC LEFT VENTRICULAR OUTFLOW OBSTRUCTION: ICD-10-CM

## 2019-03-26 DIAGNOSIS — I48.0 PAF (PAROXYSMAL ATRIAL FIBRILLATION) (HCC): ICD-10-CM

## 2019-03-26 DIAGNOSIS — R55 SYNCOPE, UNSPECIFIED SYNCOPE TYPE: Primary | ICD-10-CM

## 2019-03-26 DIAGNOSIS — E78.2 MIXED HYPERLIPIDEMIA: ICD-10-CM

## 2019-03-26 PROCEDURE — 1036F TOBACCO NON-USER: CPT | Performed by: INTERNAL MEDICINE

## 2019-03-26 PROCEDURE — G8427 DOCREV CUR MEDS BY ELIG CLIN: HCPCS | Performed by: INTERNAL MEDICINE

## 2019-03-26 PROCEDURE — 1123F ACP DISCUSS/DSCN MKR DOCD: CPT | Performed by: INTERNAL MEDICINE

## 2019-03-26 PROCEDURE — 4040F PNEUMOC VAC/ADMIN/RCVD: CPT | Performed by: INTERNAL MEDICINE

## 2019-03-26 PROCEDURE — G8420 CALC BMI NORM PARAMETERS: HCPCS | Performed by: INTERNAL MEDICINE

## 2019-03-26 PROCEDURE — G8482 FLU IMMUNIZE ORDER/ADMIN: HCPCS | Performed by: INTERNAL MEDICINE

## 2019-03-26 PROCEDURE — 99214 OFFICE O/P EST MOD 30 MIN: CPT | Performed by: INTERNAL MEDICINE

## 2019-03-26 PROCEDURE — 1101F PT FALLS ASSESS-DOCD LE1/YR: CPT | Performed by: INTERNAL MEDICINE

## 2019-04-06 ENCOUNTER — HOSPITAL ENCOUNTER (INPATIENT)
Age: 84
LOS: 2 days | Discharge: HOME | DRG: 813 | End: 2019-04-08
Attending: EMERGENCY MEDICINE | Admitting: HOSPITALIST
Payer: MEDICARE

## 2019-04-06 DIAGNOSIS — K92.2 LOWER GI BLEED: Primary | ICD-10-CM

## 2019-04-06 DIAGNOSIS — K56.41 FECAL IMPACTION (HCC): ICD-10-CM

## 2019-04-06 LAB
A/G RATIO: 1.1 (ref 1.1–2.2)
ABO/RH: NORMAL
ALBUMIN SERPL-MCNC: 4.2 G/DL (ref 3.4–5)
ALP BLD-CCNC: 66 U/L (ref 40–129)
ALT SERPL-CCNC: 12 U/L (ref 10–40)
ANION GAP SERPL CALCULATED.3IONS-SCNC: 13 MMOL/L (ref 3–16)
ANTIBODY SCREEN: NORMAL
AST SERPL-CCNC: 26 U/L (ref 15–37)
BASOPHILS ABSOLUTE: 0 K/UL (ref 0–0.2)
BASOPHILS RELATIVE PERCENT: 0.8 %
BILIRUB SERPL-MCNC: <0.2 MG/DL (ref 0–1)
BUN BLDV-MCNC: 17 MG/DL (ref 7–20)
CALCIUM SERPL-MCNC: 9.4 MG/DL (ref 8.3–10.6)
CHLORIDE BLD-SCNC: 103 MMOL/L (ref 99–110)
CO2: 26 MMOL/L (ref 21–32)
CREAT SERPL-MCNC: 1.4 MG/DL (ref 0.8–1.3)
EOSINOPHILS ABSOLUTE: 0.1 K/UL (ref 0–0.6)
EOSINOPHILS RELATIVE PERCENT: 1.5 %
GFR AFRICAN AMERICAN: 57
GFR NON-AFRICAN AMERICAN: 47
GLOBULIN: 3.7 G/DL
GLUCOSE BLD-MCNC: 111 MG/DL (ref 70–99)
HCT VFR BLD CALC: 32.3 % (ref 40.5–52.5)
HEMOGLOBIN: 11 G/DL (ref 13.5–17.5)
LYMPHOCYTES ABSOLUTE: 1.6 K/UL (ref 1–5.1)
LYMPHOCYTES RELATIVE PERCENT: 28.1 %
MCH RBC QN AUTO: 32 PG (ref 26–34)
MCHC RBC AUTO-ENTMCNC: 33.9 G/DL (ref 31–36)
MCV RBC AUTO: 94.4 FL (ref 80–100)
MONOCYTES ABSOLUTE: 0.6 K/UL (ref 0–1.3)
MONOCYTES RELATIVE PERCENT: 11 %
NEUTROPHILS ABSOLUTE: 3.2 K/UL (ref 1.7–7.7)
NEUTROPHILS RELATIVE PERCENT: 58.6 %
OCCULT BLOOD DIAGNOSTIC: ABNORMAL
PDW BLD-RTO: 14.4 % (ref 12.4–15.4)
PLATELET # BLD: 172 K/UL (ref 135–450)
PMV BLD AUTO: 9.1 FL (ref 5–10.5)
POTASSIUM SERPL-SCNC: 5.4 MMOL/L (ref 3.5–5.1)
RBC # BLD: 3.42 M/UL (ref 4.2–5.9)
SODIUM BLD-SCNC: 142 MMOL/L (ref 136–145)
TOTAL PROTEIN: 7.9 G/DL (ref 6.4–8.2)
WBC # BLD: 5.5 K/UL (ref 4–11)

## 2019-04-06 PROCEDURE — 85025 COMPLETE CBC W/AUTO DIFF WBC: CPT

## 2019-04-06 PROCEDURE — 86900 BLOOD TYPING SEROLOGIC ABO: CPT

## 2019-04-06 PROCEDURE — 80053 COMPREHEN METABOLIC PANEL: CPT

## 2019-04-06 PROCEDURE — C9113 INJ PANTOPRAZOLE SODIUM, VIA: HCPCS | Performed by: HOSPITALIST

## 2019-04-06 PROCEDURE — 86850 RBC ANTIBODY SCREEN: CPT

## 2019-04-06 PROCEDURE — 99285 EMERGENCY DEPT VISIT HI MDM: CPT

## 2019-04-06 PROCEDURE — 6360000002 HC RX W HCPCS: Performed by: HOSPITALIST

## 2019-04-06 PROCEDURE — 86901 BLOOD TYPING SEROLOGIC RH(D): CPT

## 2019-04-06 PROCEDURE — G0328 FECAL BLOOD SCRN IMMUNOASSAY: HCPCS

## 2019-04-06 PROCEDURE — 2580000003 HC RX 258: Performed by: HOSPITALIST

## 2019-04-06 PROCEDURE — 1200000000 HC SEMI PRIVATE

## 2019-04-06 PROCEDURE — 6370000000 HC RX 637 (ALT 250 FOR IP): Performed by: INTERNAL MEDICINE

## 2019-04-06 PROCEDURE — 2580000003 HC RX 258: Performed by: PHYSICIAN ASSISTANT

## 2019-04-06 RX ORDER — 0.9 % SODIUM CHLORIDE 0.9 %
500 INTRAVENOUS SOLUTION INTRAVENOUS ONCE
Status: COMPLETED | OUTPATIENT
Start: 2019-04-06 | End: 2019-04-06

## 2019-04-06 RX ORDER — SODIUM CHLORIDE 0.9 % (FLUSH) 0.9 %
10 SYRINGE (ML) INJECTION EVERY 12 HOURS SCHEDULED
Status: DISCONTINUED | OUTPATIENT
Start: 2019-04-06 | End: 2019-04-08 | Stop reason: HOSPADM

## 2019-04-06 RX ORDER — ONDANSETRON 2 MG/ML
4 INJECTION INTRAMUSCULAR; INTRAVENOUS EVERY 6 HOURS PRN
Status: DISCONTINUED | OUTPATIENT
Start: 2019-04-06 | End: 2019-04-08 | Stop reason: HOSPADM

## 2019-04-06 RX ORDER — ACETAMINOPHEN 325 MG/1
650 TABLET ORAL EVERY 4 HOURS PRN
Status: DISCONTINUED | OUTPATIENT
Start: 2019-04-06 | End: 2019-04-08 | Stop reason: HOSPADM

## 2019-04-06 RX ORDER — SODIUM CHLORIDE 9 MG/ML
INJECTION, SOLUTION INTRAVENOUS CONTINUOUS
Status: DISCONTINUED | OUTPATIENT
Start: 2019-04-06 | End: 2019-04-08 | Stop reason: HOSPADM

## 2019-04-06 RX ORDER — POLYETHYLENE GLYCOL 3350 17 G/17G
17 POWDER, FOR SOLUTION ORAL NIGHTLY
Status: DISCONTINUED | OUTPATIENT
Start: 2019-04-07 | End: 2019-04-08 | Stop reason: HOSPADM

## 2019-04-06 RX ORDER — SODIUM CHLORIDE 0.9 % (FLUSH) 0.9 %
10 SYRINGE (ML) INJECTION PRN
Status: DISCONTINUED | OUTPATIENT
Start: 2019-04-06 | End: 2019-04-08 | Stop reason: HOSPADM

## 2019-04-06 RX ORDER — POLYETHYLENE GLYCOL 3350 17 G/17G
34 POWDER, FOR SOLUTION ORAL ONCE
Status: COMPLETED | OUTPATIENT
Start: 2019-04-06 | End: 2019-04-06

## 2019-04-06 RX ORDER — PRAVASTATIN SODIUM 40 MG
40 TABLET ORAL DAILY
Status: DISCONTINUED | OUTPATIENT
Start: 2019-04-07 | End: 2019-04-08 | Stop reason: HOSPADM

## 2019-04-06 RX ADMIN — SODIUM CHLORIDE 500 ML: 9 INJECTION, SOLUTION INTRAVENOUS at 17:37

## 2019-04-06 RX ADMIN — PANTOPRAZOLE SODIUM 8 MG/HR: 40 INJECTION, POWDER, FOR SOLUTION INTRAVENOUS at 21:29

## 2019-04-06 RX ADMIN — POLYETHYLENE GLYCOL 3350 34 G: 17 POWDER, FOR SOLUTION ORAL at 19:29

## 2019-04-06 RX ADMIN — SODIUM CHLORIDE: 9 INJECTION, SOLUTION INTRAVENOUS at 18:35

## 2019-04-06 ASSESSMENT — PAIN DESCRIPTION - PROGRESSION: CLINICAL_PROGRESSION: NOT CHANGED

## 2019-04-06 ASSESSMENT — PAIN DESCRIPTION - PAIN TYPE: TYPE: ACUTE PAIN

## 2019-04-06 ASSESSMENT — PAIN SCALES - GENERAL
PAINLEVEL_OUTOF10: 2
PAINLEVEL_OUTOF10: 0
PAINLEVEL_OUTOF10: 0

## 2019-04-06 ASSESSMENT — ENCOUNTER SYMPTOMS
VOMITING: 0
SHORTNESS OF BREATH: 0
ABDOMINAL PAIN: 0
CONSTIPATION: 1
BLOOD IN STOOL: 1

## 2019-04-06 ASSESSMENT — PAIN DESCRIPTION - DESCRIPTORS: DESCRIPTORS: SORE

## 2019-04-06 ASSESSMENT — PAIN DESCRIPTION - ORIENTATION: ORIENTATION: OUTER

## 2019-04-06 ASSESSMENT — PAIN - FUNCTIONAL ASSESSMENT: PAIN_FUNCTIONAL_ASSESSMENT: ACTIVITIES ARE NOT PREVENTED

## 2019-04-06 ASSESSMENT — PAIN DESCRIPTION - FREQUENCY: FREQUENCY: CONTINUOUS

## 2019-04-06 ASSESSMENT — PAIN DESCRIPTION - ONSET: ONSET: SUDDEN

## 2019-04-06 ASSESSMENT — PAIN DESCRIPTION - LOCATION: LOCATION: RECTUM

## 2019-04-06 NOTE — ED NOTES
SBAR to [facility], all questions answered. Patient has 20G in R wrist.  Patient NSR on cardiac monitor, breathing unlabored, is alert. Sent to room 3112 via stretcher and transport.      Klever Shelby RN  04/06/19  Amrit Conner RN  04/06/19 0153

## 2019-04-06 NOTE — H&P
Hospitalist  History and Physical    Patient:  Jaki Gomes  MRN: [id number]  PCP: Kaylin Tamez MD    CHIEF COMPLAINT: Bright red blood per rectum      HISTORY OF PRESENT ILLNESS:   The patient Jaki Gomes is a 80 y.o.male with medical history significant for prostate cancer, hemorrhoids hypertension and hyperlipidemia. Patient presented to the emergency room with several months of bright red blood per rectum. Today patient had large amount of blood which triggered his visit to the emergency room. Patient also reports that he has trouble with long-standing constipation. Patient also reports a history of atrial fibrillation and he is chronically on anticoagulation with Eliquis. Past Medical History:        Diagnosis Date    Cancer [facility])     Prostate    Hemorrhoids     Hyperlipidemia     Hypertension     Influenza A 12/29/2014    Shortness of breath        Past Surgical History:        Procedure Laterality Date    CARPAL TUNNEL RELEASE Right 09/01/15    Right carpal tunnel release      CARPAL TUNNEL RELEASE Left 9/22/15    CYST REMOVAL      right wrist    ENDOSCOPY, COLON, DIAGNOSTIC      HERNIA REPAIR Right 1946    inguinal    UPPER GASTROINTESTINAL ENDOSCOPY      with dilatation       Medications Prior to Admission:    Prior to Admission medications    Medication Sig Start Date End Date Taking?  Authorizing Provider   pravastatin (PRAVACHOL) 40 MG tablet TAKE ONE TABLET BY MOUTH DAILY 2/13/19  Yes BERNARDO Cuevas MD   ELIQUIS 2.5 MG TABS tablet TAKE ONE TABLET BY MOUTH TWICE A DAY  Patient taking differently: TAKE two TABLET BY MOUTH TWICE A DAY 1/2/19  Yes Jennifer Rome MD   Multiple Minerals-Vitamins (PROSTEON PO) Take 2 tablets by mouth 2 times daily    Yes Historical Provider, MD   magnesium hydroxide (MILK OF MAGNESIA) 400 MG/5ML suspension Take 30 mLs by mouth daily as needed for Constipation 9/11/17   Toshia Screen, APRN - CNP       Allergies:  Patient has no known

## 2019-04-06 NOTE — ED PROVIDER NOTES
[address]   eMERGENCY dEPARTMENT eNCOUnter      Pt Name: Briana Franklin  MRN: [id number]  [place] 8/8/1926  Date of evaluation: 4/6/2019  Provider: Eddi Fonseac, [facility]       Chief Complaint   Patient presents with    Rectal Bleeding     today. sts thinks r/t hemorrhoids       CRITICAL CARE TIME   Total Critical Care time was 15 minutes, excluding separately reportable procedures. There was a high probability of clinically significant/life threatening deterioration in the patient's condition which required my urgent intervention. HISTORY OF PRESENT ILLNESS  (Location/Symptom, Timing/Onset, Context/Setting, Quality, Duration, Modifying Factors, Severity.)   Briana Franklin is a 80 y.o. male who presents to the emergency department accompanied by his wife. Patient states adamantly for years he's noticed bright red blood with bowel movements after having radiation for prostate surgery years ago but today he had a large bright red bloody bowel movement which prompted his ER visit. He states he's had trouble with constipation in the past and was attempting to have a bowel movement when he had the large what he bowel movement that he states filled the toilet. He denies abdominal pain. He takes Eliquis for history of A. fib. No vomiting no fevers. Nursing Notes were reviewed and I agree. REVIEW OF SYSTEMS    (2-9 systems for level 4, 10 or more for level 5)     Review of Systems   Constitutional: Negative for fever. Respiratory: Negative for shortness of breath. Cardiovascular: Negative for chest pain. Gastrointestinal: Positive for blood in stool and constipation. Negative for abdominal pain and vomiting. Skin: Negative for wound. Neurological: Negative for weakness and numbness. Psychiatric/Behavioral: Negative for agitation, behavioral problems and confusion.      Except as noted above the remainder of the review of systems was reviewed and negative. PAST MEDICAL HISTORY         Diagnosis Date    Cancer [facility])     Prostate    Hemorrhoids     Hyperlipidemia     Hypertension     Influenza A 2014    Shortness of breath        SURGICAL HISTORY           Procedure Laterality Date    CARPAL TUNNEL RELEASE Right 09/01/15    Right carpal tunnel release      CARPAL TUNNEL RELEASE Left 9/22/15    CYST REMOVAL      right wrist    ENDOSCOPY, COLON, DIAGNOSTIC      HERNIA REPAIR Right 194    inguinal    UPPER GASTROINTESTINAL ENDOSCOPY      with dilatation       CURRENT MEDICATIONS       Current Discharge Medication List      CONTINUE these medications which have NOT CHANGED    Details   pravastatin (PRAVACHOL) 40 MG tablet TAKE ONE TABLET BY MOUTH DAILY  Qty: 30 tablet, Refills: 4    Associated Diagnoses: Pure hypercholesterolemia      ELIQUIS 2.5 MG TABS tablet TAKE ONE TABLET BY MOUTH TWICE A DAY  Qty: 60 tablet, Refills: 4      Multiple Minerals-Vitamins (PROSTEON PO) Take 2 tablets by mouth 2 times daily       magnesium hydroxide (MILK OF MAGNESIA) 400 MG/5ML suspension Take 30 mLs by mouth daily as needed for Constipation  Qty: 1 Bottle, Refills: 0             ALLERGIES     Patient has no known allergies. FAMILY HISTORY           Problem Relation Age of Onset    Hypertension Other     Stroke Other      Family Status   Relation Name Status    Mother      Father      Other  (Not Specified)    Other  (Not Specified)        SOCIAL HISTORY      reports that he quit smoking about 63 years ago. He has never used smokeless tobacco. He reports that he does not drink alcohol or use drugs. PHYSICAL EXAM    (up to 7 for level 4, 8 or more for level 5)     ED Triage Vitals [19 1532]   BP Temp Temp Source Pulse Resp SpO2 Height Weight   (!) 157/63 98.1 °F (36.7 °C) Oral 79 18 97 % -- 152 lb 8.9 oz (69.2 kg)     Physical Exam   Constitutional: He is oriented to person, place, and time.  He appears well-developed and well-nourished. No distress. HENT:   Head: Normocephalic and atraumatic. Eyes: Pupils are equal, round, and reactive to light. Neck: Normal range of motion. Cardiovascular: Normal rate. Pulmonary/Chest: Effort normal. No respiratory distress. Abdominal: Soft. There is no tenderness. Genitourinary:   Genitourinary Comments: Bright red blood mixed with medium brown stool. Large fecal impaction on digital exam.   Musculoskeletal: Normal range of motion. Neurological: He is alert and oriented to person, place, and time. Psychiatric: He has a normal mood and affect. His behavior is normal.   Nursing note and vitals reviewed.       DIAGNOSTIC RESULTS     NONE    LABS:  Labs Reviewed   CBC WITH AUTO DIFFERENTIAL - Abnormal; Notable for the following components:       Result Value    RBC 3.42 (*)     Hemoglobin 11.0 (*)     Hematocrit 32.3 (*)     All other components within normal limits    Narrative:     Performed at:  [address] [employer] [address]   Phone [phone]   COMPREHENSIVE METABOLIC PANEL - Abnormal; Notable for the following components:    Potassium 5.4 (*)     Glucose 111 (*)     CREATININE 1.4 (*)     GFR Non- 47 (*)     GFR  57 (*)     All other components within normal limits    Narrative:     Performed at:  [place] [address] [address] [facility] [employer] [address]   Phone [phone]   BLOOD OCCULT STOOL DIAGNOSTIC - Abnormal; Notable for the following components:    Occult Blood Diagnostic   (*)     Value: Result: POSITIVE  Normal range: Negative      All other components within normal limits    Narrative:     ORDER#: [id number]                          ORDERED BY: Jama Bosworth, ANNA  SOURCE: Stool Formed                       COLLECTED:  04/06/19 16:14  ANTIBIOTICS AT SUSI.:                      RECEIVED :  04/06/19 16:14  Performed at:  [facility]

## 2019-04-06 NOTE — ED TRIAGE NOTES
Pt to ED with c/o rectal bleeding. Pt spouse states the patient has been sitting on the toilet today for an extended amount of time.

## 2019-04-06 NOTE — PROGRESS NOTES
Checking on patient Q2H for nutrition needs, hygiene needs, comfort measures, mobility, fall risk interventions, and safe environment. All precautions and interventions in place. Educated patient on use of call light and telephone. Patient verbalizes understanding. Call light/telephone in reach.   Electronically signed by Milton Zhao RN on 4/6/2019 at 6:51 PM

## 2019-04-07 LAB
ALBUMIN SERPL-MCNC: 3.2 G/DL (ref 3.4–5)
ALP BLD-CCNC: 58 U/L (ref 40–129)
ALT SERPL-CCNC: 7 U/L (ref 10–40)
AST SERPL-CCNC: 16 U/L (ref 15–37)
BILIRUB SERPL-MCNC: 0.3 MG/DL (ref 0–1)
BILIRUBIN DIRECT: <0.2 MG/DL (ref 0–0.3)
BILIRUBIN, INDIRECT: ABNORMAL MG/DL (ref 0–1)
HCT VFR BLD CALC: 28.4 % (ref 40.5–52.5)
HEMOGLOBIN: 10.6 G/DL (ref 13.5–17.5)
HEMOGLOBIN: 10.6 G/DL (ref 13.5–17.5)
HEMOGLOBIN: 9.6 G/DL (ref 13.5–17.5)
MCH RBC QN AUTO: 31.7 PG (ref 26–34)
MCHC RBC AUTO-ENTMCNC: 33.9 G/DL (ref 31–36)
MCV RBC AUTO: 93.5 FL (ref 80–100)
PDW BLD-RTO: 14.4 % (ref 12.4–15.4)
PLATELET # BLD: 148 K/UL (ref 135–450)
PMV BLD AUTO: 9 FL (ref 5–10.5)
RBC # BLD: 3.04 M/UL (ref 4.2–5.9)
TOTAL PROTEIN: 6.2 G/DL (ref 6.4–8.2)
WBC # BLD: 5.3 K/UL (ref 4–11)

## 2019-04-07 PROCEDURE — 94760 N-INVAS EAR/PLS OXIMETRY 1: CPT

## 2019-04-07 PROCEDURE — 6370000000 HC RX 637 (ALT 250 FOR IP): Performed by: INTERNAL MEDICINE

## 2019-04-07 PROCEDURE — C9113 INJ PANTOPRAZOLE SODIUM, VIA: HCPCS | Performed by: HOSPITALIST

## 2019-04-07 PROCEDURE — 6360000002 HC RX W HCPCS: Performed by: HOSPITALIST

## 2019-04-07 PROCEDURE — 6370000000 HC RX 637 (ALT 250 FOR IP): Performed by: HOSPITALIST

## 2019-04-07 PROCEDURE — 85027 COMPLETE CBC AUTOMATED: CPT

## 2019-04-07 PROCEDURE — 36415 COLL VENOUS BLD VENIPUNCTURE: CPT

## 2019-04-07 PROCEDURE — 80076 HEPATIC FUNCTION PANEL: CPT

## 2019-04-07 PROCEDURE — 85018 HEMOGLOBIN: CPT

## 2019-04-07 PROCEDURE — 2580000003 HC RX 258: Performed by: HOSPITALIST

## 2019-04-07 PROCEDURE — 1200000000 HC SEMI PRIVATE

## 2019-04-07 RX ADMIN — PANTOPRAZOLE SODIUM 8 MG/HR: 40 INJECTION, POWDER, FOR SOLUTION INTRAVENOUS at 07:02

## 2019-04-07 RX ADMIN — POLYETHYLENE GLYCOL 3350 17 G: 17 POWDER, FOR SOLUTION ORAL at 20:57

## 2019-04-07 RX ADMIN — PANTOPRAZOLE SODIUM 8 MG/HR: 40 INJECTION, POWDER, FOR SOLUTION INTRAVENOUS at 18:58

## 2019-04-07 RX ADMIN — Medication 10 ML: at 14:46

## 2019-04-07 RX ADMIN — POLYETHYLENE GLYCOL-3350 AND ELECTROLYTES 4000 ML: 236; 6.74; 5.86; 2.97; 22.74 POWDER, FOR SOLUTION ORAL at 14:46

## 2019-04-07 RX ADMIN — PRAVASTATIN SODIUM 40 MG: 40 TABLET ORAL at 14:46

## 2019-04-07 RX ADMIN — SODIUM CHLORIDE: 9 INJECTION, SOLUTION INTRAVENOUS at 01:53

## 2019-04-07 ASSESSMENT — PAIN SCALES - GENERAL: PAINLEVEL_OUTOF10: 0

## 2019-04-07 NOTE — PROGRESS NOTES
Hospitalist Progress Note  4/7/2019 7:47 AM    PCP: Cary Lincoln MD    [id number]     Date of Admission: 4/6/2019                                                                                                                     HOSPITAL COURSE    Patient demographics:  The patient  Smita Sanchez is a 80 y.o. male     Significant past medical history:   Patient Active Problem List   Diagnosis    Hyperlipidemia    HTN (hypertension)    Neck pain    Abdominal pain    Abdominal pain, other specified site    Dysphagia    Esophagitis    Flu vaccine need    Prostate cancer ([address]    Bilateral hand pain    Carpal tunnel syndrome of right wrist    Iron deficiency anemia due to chronic blood loss    Arthritis of left knee    Paroxysmal atrial fibrillation (HCC)    Restrictive airway disease    Symptomatic bradycardia    Syncope and collapse    Subdural hematoma (HCC)    GI bleed         Presenting symptoms:  Bright red blood per rectum    Diagnostic workup:      CONSULTS DURING ADMISSION :   IP CONSULT TO GI      Patient was diagnosed with:        Treatment while inpatient:                                                                                         ----------------------------------------------------------      SUBJECTIVE COMPLAINTS- follow-up for GI bleed    Diet: DIET CLEAR LIQUID;      OBJECTIVE:   Patient Active Problem List   Diagnosis    Hyperlipidemia    HTN (hypertension)    Neck pain    Abdominal pain    Abdominal pain, other specified site    Dysphagia    Esophagitis    Flu vaccine need    Prostate cancer ([address]    Bilateral hand pain    Carpal tunnel syndrome of right wrist    Iron deficiency anemia due to chronic blood loss    Arthritis of left knee    Paroxysmal atrial fibrillation (HCC)    Restrictive airway disease    Symptomatic bradycardia    Syncope and collapse    Subdural hematoma (HCC)    GI bleed       Allergies  Patient has no known allergies. Medications    Scheduled Meds:   pravastatin  40 mg Oral Daily    sodium chloride flush  10 mL Intravenous 2 times per day    polyethylene glycol  17 g Oral Nightly     Continuous Infusions:   sodium chloride 125 mL/hr at 04/07/19 0153    pantoprozole (PROTONIX) infusion 8 mg/hr (04/07/19 0702)     PRN Meds:  sodium chloride flush, acetaminophen, ondansetron    Vitals   Vitals /wt   Patient Vitals for the past 8 hrs:   BP Temp Temp src Pulse Resp SpO2 Weight   04/07/19 0343 -- -- -- -- -- -- 150 lb 9.2 oz (68.3 kg)   04/07/19 0328 (!) 146/69 98.3 °F (36.8 °C) Oral 66 18 98 % --        72HR INTAKE/OUTPUT:      Intake/Output Summary (Last 24 hours) at 4/7/2019 0747  Last data filed at 4/7/2019 2202  Gross per 24 hour   Intake 2481 ml   Output 1250 ml   Net 1231 ml       Exam:    Gen:   Alert and oriented ×3   Eyes: PERRL. No sclera icterus. No conjunctival injection. ENT: No discharge. Pharynx clear. External appearance of ears and nose normal.  Neck: Trachea midline. No obvious mass. Resp: No accessory muscle use. No crackles. No wheezes. No rhonchi. CV: Regular rate. Regular rhythm. No murmur or rub. No edema. GI: Non-tender. Non-distended. No hernia. Skin: Warm, dry, normal texture and turgor. Lymph: No cervical LAD. No supraclavicular LAD. M/S: / Ext. No cyanosis. No clubbing. No joint deformity. Neuro: CN 2-12 are intact,  no neurologic deficits noted. PT/INR: No results for input(s): PROTIME, INR in the last 72 hours. APTT: No results for input(s): APTT in the last 72 hours.     CBC:   Recent Labs     04/06/19  1607 04/07/19  0455   WBC 5.5 5.3   HGB 11.0* 9.6*   HCT 32.3* 28.4*   MCV 94.4 93.5    148       BMP:   Recent Labs     04/06/19  1607      K 5.4*      CO2 26   BUN 17   CREATININE 1.4*       LIVER PROFILE:   Recent Labs     04/06/19  1607 04/07/19  0455   ALKPHOS 66 58   AST 26 16   ALT 12 7*   BILIDIR  --  <0.2   BILITOT <0.2 0.3     No results for input(s): AMYLASE in the last 72 hours. No results for input(s): LIPASE in the last 72 hours. UA:No results for input(s): NITRITE, LABCAST, WBCUA, RBCUA, MUCUS in the last 72 hours. TROPONIN: No results for input(s): Ellender Chau in the last 72 hours. Lab Results   Component Value Date/Time    URRFLXCULT Not Indicated 01/18/2019 07:54 AM       No results for input(s): TSHREFLEX in the last 72 hours. No components found for: ULU1969  POC GLUCOSE:  No results for input(s): POCGLU in the last 72 hours. No results for input(s): LABA1C in the last 72 hours. No results found for: LABA1C      ASSESSMENT AND PLAN  Bright red blood per rectum  GI consult is appreciated  Plan is for colonoscopy in a.m. Constipation   Start patient on MiraLAX      Acute blood loss anemia   continue to monitor hemoglobin      DVT and GI prophylaxis             Code Status: Full Code        Dispo - continue care        The patient and / or the family were informed of the results of any tests, a time was given to answer questions, a plan was proposed and they agreed with plan. Lb Thomas MD    This note was transcribed using [id number]. Please disregard any translational errors.

## 2019-04-07 NOTE — PLAN OF CARE
Problem: Falls - Risk of:  Goal: Will remain free from falls  Description  Will remain free from falls  4/7/2019 0356 by Sherine Hunter RN  Outcome: Ongoing     Problem: Falls - Risk of:  Goal: Absence of physical injury  Description  Absence of physical injury  4/7/2019 0356 by Sherine Hunter RN  Outcome: Ongoing   Fall risk assessment completed every shift. All precautions in place. Pt has call light within reach at all times. Room clear of clutter. Pt aware to call for assistance when getting up. Problem: Skin Integrity:  Goal: Will show no infection signs and symptoms  Description  Will show no infection signs and symptoms  4/7/2019 0356 by Sherine Hunter RN  Outcome: Ongoing   No S/S of infection noted. Remains afebrile. Will monitor. Problem: Skin Integrity:  Goal: Absence of new skin breakdown  Description  Absence of new skin breakdown  4/7/2019 0356 by Sherine Hunter RN  Outcome: Ongoing     Problem: Bowel Function - Altered:  Goal: Bowel elimination is within specified parameters  Description  Bowel elimination is within specified parameters  4/7/2019 0356 by Sherine Hunter RN  Outcome: Ongoing   Pt only had a smear of a BM during this shift so far. No blood noted. Will monitor labs and continue to monitor for bleeding. Problem: Fluid Volume - Imbalance:  Goal: Will show no signs and symptoms of excessive bleeding  Description  Will show no signs and symptoms of excessive bleeding  4/7/2019 0356 by Sherine Hunter RN  Outcome: Ongoing   No S/S of bleeding noted. Will continue to monitor.

## 2019-04-07 NOTE — PROGRESS NOTES
Clinical Pharmacy Note  Medication Counseling    Reviewed new medications started during hospital admission: Pantoprazole drip. Indications and side effects were emphasized during counseling. All medication-related questions addressed. Patient verbalized understanding of education. Should the patient express any additional questions or concerns regarding their medications, please do not hesitate to contact the pharmacy department. Patient/caregiver aware they may refuse medications during hospital stay.    Antonio Richardson, [address] 4/7/2019 10:23 AM

## 2019-04-07 NOTE — ED PROVIDER NOTES
polyethylene glycol (GLYCOLAX) packet 34 g (34 g Oral Given 4/6/19 1929)       Current Discharge Medication List          The patient's blood pressure was found to be elevated according to CMS/Medicare and the Affordable Care Act/ObamaCare criteria. Elevated blood pressure could occur because of pain or anxiety or other reasons and does not mean that they need to have their blood pressure treated or medications otherwise adjusted. However, this could also be a sign that they will need to have their blood pressure treated or medications changed. The patient was instructed to follow up closely with their personal physician to have their blood pressure rechecked. The patient was instructed to take a list of recent blood pressure readings to their next visit with their personal physician. IMPRESSIONS:  1. Lower GI bleed    2.  Fecal impaction [facility])                Vivienne Mejia DO  04/06/19 0702

## 2019-04-07 NOTE — PROGRESS NOTES
4 Eyes Admission Assessment     I agree as the admission nurse that 2 RN's have performed a thorough Head to Toe Skin Assessment on the patient. ALL assessment sites listed below have been assessed on admission. Areas assessed by both nurses:   [x]   Head, Face, and Ears   [x]   Shoulders, Back, and Chest  [x]   Arms, Elbows, and Hands   [x]   Coccyx, Sacrum, and Ischum  [x]   Legs, Feet, and Heels        Does the Patient have Skin Breakdown?   No         Sherman Prevention initiated:  NA   Wound Care Orders initiated:  NA      WO nurse consulted for Pressure Injury (Stage 3,4, Unstageable, DTI, NWPT, and Complex wounds):  NA      Nurse 1 eSignature: Electronically signed by Modesto Anna RN on 4/6/19 at 11:50 PM    **SHARE this note so that the co-signing nurse is able to place an eSignature**    Nurse 2 eSignature: Electronically signed by Ana Baldwin RN on 4/7/19 at 12:31 AM

## 2019-04-07 NOTE — PROGRESS NOTES
UTILIZATION REVIEW     Adrián TORRES  4/7/2019,   Hammad Fritz MD    8/8/1926  Chief Complaint   Patient presents with    Rectal Bleeding     today. sts thinks r/t hemorrhoids        Active Problems:    GI bleed  Resolved Problems:    * No resolved hospital problems. *     has a past medical history of Cancer ([address] Hemorrhoids, Hyperlipidemia, Hypertension, Influenza A, and Shortness of breath. Past Surgical History:     has a past surgical history that includes Endoscopy, colon, diagnostic; Upper gastrointestinal endoscopy; hernia repair (Right, 1946); cyst removal; Carpal tunnel release (Right, 09/01/15); and Carpal tunnel release (Left, 9/22/15). ED REVIEW:  Chief Complaint   Patient presents with    Rectal Bleeding       today. sts thinks r/t hemorrhoids         CRITICAL CARE TIME   Total Critical Care time was 15 minutes, excluding separately reportable procedures. There was a high probability of clinically significant/life threatening deterioration in the patient's condition which required my urgent intervention.        HISTORY OF PRESENT ILLNESS  (Location/Symptom, Timing/Onset, Context/Setting, Quality, Duration, Modifying Factors, Severity.)   Ruddy Joiner is a 80 y.o. male who presents to the emergency department accompanied by his wife. Patient states adamantly for years he's noticed bright red blood with bowel movements after having radiation for prostate surgery years ago but today he had a large bright red bloody bowel movement which prompted his ER visit. He states he's had trouble with constipation in the past and was attempting to have a bowel movement when he had the large what he bowel movement that he states filled the toilet. He denies abdominal pain. He takes Eliquis for history of A. fib. No vomiting no fevers. CURRENT STATUS:  Active Problems:    GI bleed  Resolved Problems:    * No resolved hospital problems.  *        LGIB hemodynamically stable  Historty

## 2019-04-07 NOTE — PLAN OF CARE
Problem: Falls - Risk of:  Goal: Will remain free from falls  Description  Will remain free from falls  4/7/2019 0800 by Letty Tobin RN  Outcome: Ongoing  Note:   Fall risk assessment completed . Fall precautions in place, bed alarm on, side rails 2/4 up, call light in reach, educated pt on calling for assistance when needed, room clear of clutter. Pt verbalized understanding. 4/7/2019 0356 by Kiko Maldonado RN  Outcome: Ongoing  4/6/2019 1849 by Letty Tobin RN  Outcome: Ongoing  Note:   Fall risk assessment completed . Fall precautions in place, bed alarm on, side rails 2/4 up, call light in reach, educated pt on calling for assistance when needed, room clear of clutter. Pt verbalized understanding. Goal: Absence of physical injury  Description  Absence of physical injury  4/7/2019 0800 by Letty Tobin RN  Outcome: Ongoing  4/7/2019 0356 by Kiko Maldonado RN  Outcome: Ongoing  4/6/2019 1849 by Letty Tobin RN  Outcome: Ongoing     Problem: Skin Integrity:  Goal: Will show no infection signs and symptoms  Description  Will show no infection signs and symptoms  4/7/2019 0800 by Letty Tobin RN  Outcome: Ongoing  Note:   Patient is alert and oriented, afebrile, has no complaints of pain, skin is intact and appropriate for ethnicity in color      4/7/2019 0356 by Kiko Maldonado RN  Outcome: Ongoing  4/6/2019 1849 by Letty Tobin RN  Outcome: Ongoing  Note:   Patient is alert and oriented, afebrile, has no complaints of pain, skin is intact and appropriate for ethnicity in color    Goal: Absence of new skin breakdown  Description  Absence of new skin breakdown  4/7/2019 0800 by Letty Tobin RN  Outcome: Ongoing  4/7/2019 0356 by Kiko Maldonado RN  Outcome: Ongoing  4/6/2019 1849 by Letty Tobin RN  Outcome: Ongoing  Note:   Sherman score assessed. Patient able to ambulate and turn self.  Educated patient on importance of repositioning to prevent skin issues. Problem: Bowel Function - Altered:  Goal: Bowel elimination is within specified parameters  Description  Bowel elimination is within specified parameters  4/7/2019 0800 by Newton Ortez RN  Outcome: Ongoing  4/7/2019 0356 by Alice Villalta RN  Outcome: Ongoing  4/6/2019 1849 by Newton Ortez RN  Outcome: Ongoing  Note:   Patient will experience a normal bowel pattern this shift. Problem: Fluid Volume - Imbalance:  Goal: Will show no signs and symptoms of excessive bleeding  Description  Will show no signs and symptoms of excessive bleeding  4/7/2019 0800 by Newton Ortez RN  Outcome: Ongoing  Note:   Pt VSS and WDL. Pt is a daily weight, strict intake and output being monitored, pt extremity neuro checks WDL. Pt is encouraged to deep breathe and cough. Pt encouraged to monitor fluid intake to prevent fluid overload. Will continue to monitor and assess. 4/7/2019 0356 by Alice Villalta RN  Outcome: Ongoing  4/6/2019 1849 by Newton Ortez RN  Outcome: Ongoing  Note:   Educated to drink fluids and to adequately hydrate, medications administered as ordered, abnormal lab values assessed and reported to physician if critical or pertinent to patient status, skin turgor, mucus membranes, and respiratory status assess this shift and is WNL. Patient and Family was included in plan of care. Will continue to monitor and reassess.

## 2019-04-07 NOTE — PROGRESS NOTES
Checking on patient Q2H for nutrition needs, hygiene needs, comfort measures, mobility, fall risk interventions, and safe environment. All precautions and interventions in place. Educated patient on use of call light and telephone. Patient verbalizes understanding. Call light/telephone in reach.   Electronically signed by Aubrey Almeida RN on 4/7/2019 at 8:01 AM

## 2019-04-07 NOTE — PROGRESS NOTES
Pt AAO x4. No c/o pain at this time. Assessment completed and charted. Wife at bedside. IVF and Protonix gtt infusing without any difficulty. Pt aware to call out for assistance and that we need to evaluate all stools. Denies needs at this time. Call light in reach. Will monitor.

## 2019-04-07 NOTE — PROGRESS NOTES
Pt only had 1 smear of a BM during shift. No blood noted. No nausea or vomiting. No c/o pain. Denies needs at this time. Call light in reach. Will monitor.

## 2019-04-07 NOTE — CONSULTS
[facility]  GI Consultation      Patient: Esther Lawson  : 1926       Date:  2019    Subjective:       History of Present Illness  Patient is a 80 y.o.  male admitted with GI bleed [K92.2] who is seen in consult for rectal bleeding; history of radiation proctitis in  and chronic constipation    On Eloquis for Afib last dose was Friday  He reports obstipation in the days prior to the bleeding- his wife says he was on commode straining quite a bit Friday with the bleeding    Bleeding described as blood clots      One small bowel streak overnight but no other bowel movements    He is hemodynamically stable  Hemoglobin 9.6 from baseline of 11    Last Colonsocopy w Dr Ricardo Baker  with large int hem, no proctitis noted and specific notation of no diverticulosis      Past Medical History:   Diagnosis Date    Cancer [facility])     Prostate    Hemorrhoids     Hyperlipidemia     Hypertension     Influenza A 2014    Shortness of breath       Past Surgical History:   Procedure Laterality Date    CARPAL TUNNEL RELEASE Right 09/01/15    Right carpal tunnel release      CARPAL TUNNEL RELEASE Left 9/22/15    CYST REMOVAL      right wrist    ENDOSCOPY, COLON, DIAGNOSTIC      HERNIA REPAIR Right     inguinal    UPPER GASTROINTESTINAL ENDOSCOPY      with dilatation      Past Endoscopic History see above    Admission Meds  No current facility-administered medications on file prior to encounter.       Current Outpatient Medications on File Prior to Encounter   Medication Sig Dispense Refill    pravastatin (PRAVACHOL) 40 MG tablet TAKE ONE TABLET BY MOUTH DAILY 30 tablet 4    ELIQUIS 2.5 MG TABS tablet TAKE ONE TABLET BY MOUTH TWICE A DAY (Patient taking differently: TAKE two TABLET BY MOUTH TWICE A DAY) 60 tablet 4    Multiple Minerals-Vitamins (PROSTEON PO) Take 2 tablets by mouth 2 times daily       magnesium hydroxide (MILK OF MAGNESIA) 400 MG/5ML suspension Take 30 mLs by mouth daily as needed for Constipation 1 Bottle 0       Patient denies ASA, NSAID use. Allergies  No Known Allergies   Social   Social History     Tobacco Use    Smoking status: Former Smoker     Last attempt to quit: 1955     Years since quittin.5    Smokeless tobacco: Never Used   Substance Use Topics    Alcohol use: No        Family History   Problem Relation Age of Onset    Hypertension Other     Stroke Other       No family history of colon cancer, Crohn's disease, or ulcerative colitis. Review of Systems  Pertinent items are noted in HPI. Physical Exam    BP (!) 158/72   Pulse 70   Temp 98.2 °F (36.8 °C) (Oral)   Resp 16   Ht 5' 9\" (1.753 m)   Wt 150 lb 9.2 oz (68.3 kg)   SpO2 97%   BMI 22.24 kg/m²   General appearance: alert, cooperative, no distress, appears stated age  Anicteric, No Jaundice  Head: Normocephalic, without obvious abnormality  Lungs: clear to auscultation bilaterally, Normal Effort  Heart: regular rate and rhythm, normal S1 and S2, no murmurs or rubs  Abdomen: soft, non-tender; bowel sounds normal; no masses,  no organomegaly  Extremities: atraumatic, no cyanosis or edema  Skin: warm and dry  Neuro: intact  AAOX3      Data Review:    Recent Labs     19  1607 19  0455   WBC 5.5 5.3   HGB 11.0* 9.6*   HCT 32.3* 28.4*   MCV 94.4 93.5    148     Recent Labs     19  1607      K 5.4*      CO2 26   BUN 17   CREATININE 1.4*     Recent Labs     19  1607 19  0455   AST 26 16   ALT 12 7*   BILIDIR  --  <0.2   BILITOT <0.2 0.3   ALKPHOS 66 58     No results for input(s): LIPASE, AMYLASE in the last 72 hours. No results for input(s): PROTIME, INR in the last 72 hours. No results for input(s): PTT in the last 72 hours. No results for input(s): OCCULTBLD in the last 72 hours.     Imaging Studies:                reviewed                 Assessment:     Active Problems:    GI bleed  Resolved Problems:    * No resolved hospital problems. 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

## 2019-04-08 ENCOUNTER — ANESTHESIA EVENT (OUTPATIENT)
Dept: ENDOSCOPY | Age: 84
DRG: 813 | End: 2019-04-08
Payer: MEDICARE

## 2019-04-08 ENCOUNTER — ANESTHESIA (OUTPATIENT)
Dept: ENDOSCOPY | Age: 84
DRG: 813 | End: 2019-04-08
Payer: MEDICARE

## 2019-04-08 VITALS
WEIGHT: 153.44 LBS | DIASTOLIC BLOOD PRESSURE: 60 MMHG | RESPIRATION RATE: 16 BRPM | SYSTOLIC BLOOD PRESSURE: 166 MMHG | OXYGEN SATURATION: 93 % | HEART RATE: 83 BPM | BODY MASS INDEX: 22.73 KG/M2 | HEIGHT: 69 IN | TEMPERATURE: 98.3 F

## 2019-04-08 LAB
ANION GAP SERPL CALCULATED.3IONS-SCNC: 8 MMOL/L (ref 3–16)
BUN BLDV-MCNC: 6 MG/DL (ref 7–20)
CALCIUM SERPL-MCNC: 8.2 MG/DL (ref 8.3–10.6)
CHLORIDE BLD-SCNC: 109 MMOL/L (ref 99–110)
CO2: 25 MMOL/L (ref 21–32)
CREAT SERPL-MCNC: 1 MG/DL (ref 0.8–1.3)
GFR AFRICAN AMERICAN: >60
GFR NON-AFRICAN AMERICAN: >60
GLUCOSE BLD-MCNC: 102 MG/DL (ref 70–99)
HCT VFR BLD CALC: 28 % (ref 40.5–52.5)
HEMOGLOBIN: 10.2 G/DL (ref 13.5–17.5)
HEMOGLOBIN: 11.1 G/DL (ref 13.5–17.5)
HEMOGLOBIN: 9.3 G/DL (ref 13.5–17.5)
MCH RBC QN AUTO: 31.3 PG (ref 26–34)
MCHC RBC AUTO-ENTMCNC: 33.3 G/DL (ref 31–36)
MCV RBC AUTO: 94 FL (ref 80–100)
PDW BLD-RTO: 14.2 % (ref 12.4–15.4)
PLATELET # BLD: 146 K/UL (ref 135–450)
PMV BLD AUTO: 8.8 FL (ref 5–10.5)
POTASSIUM SERPL-SCNC: 4.5 MMOL/L (ref 3.5–5.1)
RBC # BLD: 2.98 M/UL (ref 4.2–5.9)
SODIUM BLD-SCNC: 142 MMOL/L (ref 136–145)
WBC # BLD: 5.9 K/UL (ref 4–11)

## 2019-04-08 PROCEDURE — 36415 COLL VENOUS BLD VENIPUNCTURE: CPT

## 2019-04-08 PROCEDURE — 3609009500 HC COLONOSCOPY DIAGNOSTIC OR SCREENING: Performed by: INTERNAL MEDICINE

## 2019-04-08 PROCEDURE — 0DH58BZ INSERTION OF AIRWAY INTO ESOPHAGUS, VIA NATURAL OR ARTIFICIAL OPENING ENDOSCOPIC: ICD-10-PCS | Performed by: INTERNAL MEDICINE

## 2019-04-08 PROCEDURE — 7100000010 HC PHASE II RECOVERY - FIRST 15 MIN: Performed by: INTERNAL MEDICINE

## 2019-04-08 PROCEDURE — 2709999900 HC NON-CHARGEABLE SUPPLY: Performed by: INTERNAL MEDICINE

## 2019-04-08 PROCEDURE — 2580000003 HC RX 258: Performed by: HOSPITALIST

## 2019-04-08 PROCEDURE — 85018 HEMOGLOBIN: CPT

## 2019-04-08 PROCEDURE — 94760 N-INVAS EAR/PLS OXIMETRY 1: CPT

## 2019-04-08 PROCEDURE — 7100000011 HC PHASE II RECOVERY - ADDTL 15 MIN: Performed by: INTERNAL MEDICINE

## 2019-04-08 PROCEDURE — 6360000002 HC RX W HCPCS: Performed by: INTERNAL MEDICINE

## 2019-04-08 PROCEDURE — 80048 BASIC METABOLIC PNL TOTAL CA: CPT

## 2019-04-08 PROCEDURE — 99153 MOD SED SAME PHYS/QHP EA: CPT | Performed by: INTERNAL MEDICINE

## 2019-04-08 PROCEDURE — 99152 MOD SED SAME PHYS/QHP 5/>YRS: CPT | Performed by: INTERNAL MEDICINE

## 2019-04-08 PROCEDURE — 85027 COMPLETE CBC AUTOMATED: CPT

## 2019-04-08 PROCEDURE — 0DJD8ZZ INSPECTION OF LOWER INTESTINAL TRACT, VIA NATURAL OR ARTIFICIAL OPENING ENDOSCOPIC: ICD-10-PCS | Performed by: INTERNAL MEDICINE

## 2019-04-08 PROCEDURE — C9113 INJ PANTOPRAZOLE SODIUM, VIA: HCPCS | Performed by: HOSPITALIST

## 2019-04-08 PROCEDURE — 6360000002 HC RX W HCPCS: Performed by: HOSPITALIST

## 2019-04-08 RX ORDER — FENTANYL CITRATE 50 UG/ML
25 INJECTION, SOLUTION INTRAMUSCULAR; INTRAVENOUS EVERY 5 MIN PRN
Status: DISCONTINUED | OUTPATIENT
Start: 2019-04-08 | End: 2019-04-08

## 2019-04-08 RX ORDER — ONDANSETRON 2 MG/ML
4 INJECTION INTRAMUSCULAR; INTRAVENOUS
Status: DISCONTINUED | OUTPATIENT
Start: 2019-04-08 | End: 2019-04-08

## 2019-04-08 RX ORDER — OXYCODONE HYDROCHLORIDE 5 MG/1
5 TABLET ORAL PRN
Status: DISCONTINUED | OUTPATIENT
Start: 2019-04-08 | End: 2019-04-08

## 2019-04-08 RX ORDER — MORPHINE SULFATE 2 MG/ML
2 INJECTION, SOLUTION INTRAMUSCULAR; INTRAVENOUS EVERY 5 MIN PRN
Status: DISCONTINUED | OUTPATIENT
Start: 2019-04-08 | End: 2019-04-08

## 2019-04-08 RX ORDER — FENTANYL CITRATE 50 UG/ML
50 INJECTION, SOLUTION INTRAMUSCULAR; INTRAVENOUS EVERY 5 MIN PRN
Status: DISCONTINUED | OUTPATIENT
Start: 2019-04-08 | End: 2019-04-08

## 2019-04-08 RX ORDER — MEPERIDINE HYDROCHLORIDE 25 MG/ML
12.5 INJECTION INTRAMUSCULAR; INTRAVENOUS; SUBCUTANEOUS EVERY 5 MIN PRN
Status: DISCONTINUED | OUTPATIENT
Start: 2019-04-08 | End: 2019-04-08

## 2019-04-08 RX ORDER — MIDAZOLAM HYDROCHLORIDE 1 MG/ML
INJECTION INTRAMUSCULAR; INTRAVENOUS
Status: COMPLETED | OUTPATIENT
Start: 2019-04-08 | End: 2019-04-08

## 2019-04-08 RX ORDER — MORPHINE SULFATE 2 MG/ML
1 INJECTION, SOLUTION INTRAMUSCULAR; INTRAVENOUS EVERY 5 MIN PRN
Status: DISCONTINUED | OUTPATIENT
Start: 2019-04-08 | End: 2019-04-08

## 2019-04-08 RX ORDER — OXYCODONE HYDROCHLORIDE 5 MG/1
10 TABLET ORAL PRN
Status: DISCONTINUED | OUTPATIENT
Start: 2019-04-08 | End: 2019-04-08

## 2019-04-08 RX ORDER — POLYETHYLENE GLYCOL 3350 17 G/17G
17 POWDER, FOR SOLUTION ORAL NIGHTLY
Qty: 527 G | Refills: 1 | Status: SHIPPED | OUTPATIENT
Start: 2019-04-08 | End: 2019-05-08

## 2019-04-08 RX ORDER — FENTANYL CITRATE 50 UG/ML
INJECTION, SOLUTION INTRAMUSCULAR; INTRAVENOUS
Status: COMPLETED | OUTPATIENT
Start: 2019-04-08 | End: 2019-04-08

## 2019-04-08 RX ADMIN — SODIUM CHLORIDE: 9 INJECTION, SOLUTION INTRAVENOUS at 02:32

## 2019-04-08 RX ADMIN — SODIUM CHLORIDE: 9 INJECTION, SOLUTION INTRAVENOUS at 11:12

## 2019-04-08 RX ADMIN — PANTOPRAZOLE SODIUM 8 MG/HR: 40 INJECTION, POWDER, FOR SOLUTION INTRAVENOUS at 02:32

## 2019-04-08 ASSESSMENT — PAIN SCALES - GENERAL
PAINLEVEL_OUTOF10: 0

## 2019-04-08 ASSESSMENT — ENCOUNTER SYMPTOMS: SHORTNESS OF BREATH: 1

## 2019-04-08 ASSESSMENT — LIFESTYLE VARIABLES: SMOKING_STATUS: 0

## 2019-04-08 ASSESSMENT — PAIN - FUNCTIONAL ASSESSMENT: PAIN_FUNCTIONAL_ASSESSMENT: 0-10

## 2019-04-08 NOTE — PLAN OF CARE
Problem: Falls - Risk of:  Goal: Will remain free from falls  Description  Will remain free from falls  Outcome: Ongoing  Goal: Absence of physical injury  Description  Absence of physical injury  Outcome: Ongoing   Fall risk assessment completed every shift. All precautions in place. Pt has call light within reach at all times. Room clear of clutter. Pt aware to call for assistance when getting up. Problem: Skin Integrity:  Goal: Will show no infection signs and symptoms  Description  Will show no infection signs and symptoms  Outcome: Ongoing  Goal: Absence of new skin breakdown  Description  Absence of new skin breakdown  Outcome: Ongoing   Skin assessment completed every shift. Pt encouraged to turn/rotate every 2 hours. Assistance provided if pt unable to do so themselves. Problem: Bowel Function - Altered:  Goal: Bowel elimination is within specified parameters  Description  Bowel elimination is within specified parameters  Outcome: Ongoing   Pt received prep for possible colonoscopy today. Stool is clear yellow and watery. No bleeding noted. Will monitor.   Problem: Fluid Volume - Imbalance:  Goal: Will show no signs and symptoms of excessive bleeding  Description  Will show no signs and symptoms of excessive bleeding  Outcome: Ongoing

## 2019-04-08 NOTE — CARE COORDINATION
INITIAL CASE MANAGEMENT ASSESSMENT    Reviewed chart, met with patient to assess possible discharge needs. Explained Case Management role/services. Living Situation: Pt states he lives at home with his wife. ADLs: Pt states he is independent. DME: None    PT/OT Recs: None     Active Services: Pt states he is not active with any outside agencies. Transportation: Pt states his wife will transport upon d/c. PCP: Layo Randolph MD    PLAN/COMMENTS: Pt states he plans to d/c home with support from spouse and family. Pt denies d/c needs. SW/CM provided contact information for patient or family to call with any questions. SW/CM will follow and assist as needed.     Electronically signed by [employer] on 4/8/2019 at 12:50 PM   Electronically signed by May Ellis on 4/8/2019 at 2:12 PM

## 2019-04-08 NOTE — CARE COORDINATION
ADVANCED CARE PLANNING    Name:Ivan Cam       :  1926              MRN:  [id number]      Purpose of Encounter: Advanced care planning in light of  anemia    . Parties in attendance: :Nima Mosqueda, Gwen Muñiz MD  Decisional Capacity:--YES     Subjective/Patient Story:  Patient  wishes To BE resuscitated in case of cardiac arrest or respiratory arrest.    Plan: Will notify Pamela López MD of change in care plan. Will look at further interventions as needed. Code Status: At this time patient wishes to be full code    Time Spent on Advanced Planning Documents: >15 minutes    Advanced Care Planning Documents: Completed advances directives, advanced directives in chart-----NO        Electronically signed by Gwen Muñiz MD on 2019 at 1:07 PM  Thank you Pamela López MD for the opportunity to be involved in this patient's care. If you have any questions or concerns please feel free to contact me at [phone].

## 2019-04-08 NOTE — PROGRESS NOTES
Reviewed discharge instructions with patient. No questions were notes. IV removed, no complications, patient tolerated well. Patient discharge via wheelchair to private car, going to private residence.

## 2019-04-08 NOTE — PLAN OF CARE
Problem: Falls - Risk of:  Goal: Will remain free from falls  Description  Will remain free from falls  4/8/2019 0735 by Nicolas Mosley RN  Outcome: Ongoing  Note:   Patient educated on fall prevention. Call light is within reach, bed locked in lowest position, personal items within reach, and bed alarm is on. Will round on patient per unit guidelines. Problem: Falls - Risk of:  Goal: Absence of physical injury  Description  Absence of physical injury  4/8/2019 0735 by Nicolas Mosley RN  Outcome: Ongoing  Note:   Pt assessed for fall risk and fall precautions put into place. Bed in lowest position and wheels locked, call light within reach. Nonskid footwear in place. Patient educated on appropriate method of transfer and to call for assistance. Problem: Skin Integrity:  Goal: Will show no infection signs and symptoms  Description  Will show no infection signs and symptoms  4/8/2019 0735 by Nicolas Mosley RN  Outcome: Ongoing  Note:   Will monitor skin and mucous members. Will turn patient every 2 hours, monitor for friction and sheering, and change dressings as needed. Will preform skin assessment every shift. Problem: Bowel Function - Altered:  Goal: Bowel elimination is within specified parameters  Description  Bowel elimination is within specified parameters  4/8/2019 0735 by Nicolas Mosley RN  Outcome: Ongoing  Note:   Patient educated on ways to have bowel movements without discomfort; dietary aids, increased hydration. Patient educated that stool softeners may be prescribed if dietary modification is not adequate. At this time, patient does not report discomfort with bowel movements. Problem: Fluid Volume - Imbalance:  Goal: Will show no signs and symptoms of excessive bleeding  Description  Will show no signs and symptoms of excessive bleeding  4/8/2019 0735 by Nicolas Mosley RN  Outcome: Ongoing  Note:   Pt shows no signs or symptoms of active bleeding.  Lab values within normal ranges. Will continue to monitor.

## 2019-04-08 NOTE — ANESTHESIA PRE PROCEDURE
Benjamin Mcburney, MD   17 g at 19       Allergies:  No Known Allergies    Problem List:    Patient Active Problem List   Diagnosis Code    Hyperlipidemia E78.5    HTN (hypertension) I10    Neck pain M54.2    Abdominal pain R10.9    Abdominal pain, other specified site R10.9    Dysphagia R13.10    Esophagitis K20.9    Flu vaccine need Z23    Prostate cancer ([facility] [address] C61    Bilateral hand pain M79.641, M79.642    Carpal tunnel syndrome of right wrist G56.01    Iron deficiency anemia due to chronic blood loss D50.0    Arthritis of left knee M17.12    Paroxysmal atrial fibrillation (HCC) I48.0    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    Symptomatic bradycardia R00.1    Syncope and collapse R55    Subdural hematoma (HCC) S06.5X9A    GI bleed K92.2       Past Medical History:        Diagnosis Date    Cancer ([facility]     Prostate    Hemorrhoids     Hyperlipidemia     Hypertension     Influenza A 2014    Shortness of breath        Past Surgical History:        Procedure Laterality Date    CARPAL TUNNEL RELEASE Right 09/01/15    Right carpal tunnel release      CARPAL TUNNEL RELEASE Left 9/22/15    CYST REMOVAL      right wrist    ENDOSCOPY, COLON, DIAGNOSTIC      HERNIA REPAIR Right 194    inguinal    UPPER GASTROINTESTINAL ENDOSCOPY      with dilatation       Social History:    Social History     Tobacco Use    Smoking status: Former Smoker     Last attempt to quit: 1955     Years since quittin.6    Smokeless tobacco: Never Used   Substance Use Topics    Alcohol use:  No                                Counseling given: Not Answered      Vital Signs (Current):   Vitals:    19 0708 19 0935 19 1114 19 1208   BP: (!) 168/70  (!) 191/77 (!) 178/77   Pulse: 72  76 71   Resp: 17 18 16 16   Temp: 98.2 °F (36.8 °C)  98.5 °F (36.9 °C) 97.5 °F (36.4 °C)   TempSrc: Oral  Oral Temporal   SpO2: 96% 91% 97% 97%   Weight:       Height: BP Readings from Last 3 Encounters:   04/08/19 (!) 178/77   03/26/19 (!) 140/70   01/23/19 (!) 140/64       NPO Status: Time of last liquid consumption: 2200                        Time of last solid consumption: 0005                        Date of last liquid consumption: 04/07/19                        Date of last solid food consumption: 04/08/19    BMI:   Wt Readings from Last 3 Encounters:   04/08/19 153 lb 7 oz (69.6 kg)   03/26/19 153 lb (69.4 kg)   01/23/19 156 lb (70.8 kg)     Body mass index is 22.66 kg/m². CBC:   Lab Results   Component Value Date    WBC 5.9 04/08/2019    RBC 2.98 04/08/2019    HGB 9.3 04/08/2019    HCT 28.0 04/08/2019    MCV 94.0 04/08/2019    RDW 14.2 04/08/2019     04/08/2019       CMP:   Lab Results   Component Value Date     04/08/2019    K 4.5 04/08/2019    K 4.3 01/18/2019     04/08/2019    CO2 25 04/08/2019    BUN 6 04/08/2019    CREATININE 1.0 04/08/2019    GFRAA >60 04/08/2019    GFRAA >60 11/12/2012    AGRATIO 1.1 04/06/2019    LABGLOM >60 04/08/2019    GLUCOSE 102 04/08/2019    PROT 6.2 04/07/2019    PROT 7.0 03/21/2013    CALCIUM 8.2 04/08/2019    BILITOT 0.3 04/07/2019    ALKPHOS 58 04/07/2019    AST 16 04/07/2019    ALT 7 04/07/2019       POC Tests: No results for input(s): POCGLU, POCNA, POCK, POCCL, POCBUN, POCHEMO, POCHCT in the last 72 hours.     Coags: No results found for: PROTIME, INR, APTT    HCG (If Applicable): No results found for: PREGTESTUR, PREGSERUM, HCG, HCGQUANT     ABGs: No results found for: PHART, PO2ART, BZE1ZIR, NJW2GXX, BEART, M0CKJBVK     Type & Screen (If Applicable):  No results found for: LABABO, [address]    Anesthesia Evaluation  Patient summary reviewed and Nursing notes reviewed no history of anesthetic complications:   Airway: Mallampati: II  TM distance: >3 FB   Neck ROM: full  Mouth opening: > = 3 FB Dental:          Pulmonary:   (+) shortness of breath:      (-) pneumonia, COPD, asthma, recent URI, sleep apnea and not a current smoker                           Cardiovascular:  Exercise tolerance: good (>4 METS),   (+) hypertension:, dysrhythmias: atrial fibrillation,     (-) pacemaker, valvular problems/murmurs, past MI, CAD, CABG/stent,  angina,  CHF, orthopnea, PND and  LYNCH      Rhythm: regular  Rate: normal                 ROS comment: Left ventricular cavity size is normal. There is moderate asymmetric   hypertrophy of the basal septum. Overall left ventricular systolic function   appears hyperdynamic. Ejection fraction is visually estimated to be >70%. No   regional wall motion abnormalities are noted. There is a late peaking   gradient recorded across the LV outflow tract with a peak gradient of   52mmHg. with Valsalva maneuver.   Mitral annular calcification is present.   Mitral valve leaflets appear mildly thickened.   systolic ant motion of anterior mitral leaflet.   Moderate mitral regurgitation.   Aortic valve appears sclerotic but appears to open adequately.   Mild tricuspid regurgitation. Neuro/Psych:   (+) neuromuscular disease:,    (-) seizures, TIA, CVA, headaches, psychiatric history and depression/anxiety            GI/Hepatic/Renal: Neg GI/Hepatic/Renal ROS            Endo/Other:    (+) blood dyscrasia (on eliquis, held for 2 days)::., .    (-) diabetes mellitus, hypothyroidism, hyperthyroidism, arthritis, no electrolyte abnormalities               Abdominal:           Vascular: negative vascular ROS. Anesthesia Plan      MAC     ASA 3     (Lisbet his POA also in room for conversation about anesthesia)  Induction: intravenous. Anesthetic plan and risks discussed with patient and healthcare power of . Plan discussed with CRNA.                 Braxton Rasmussen MD   4/8/2019      This pre-anesthesia assessment may be used as a history and physical.    DOS STAFF ADDENDUM:    Pt seen and examined, chart reviewed (including anesthesia, drug and allergy history). No interval changes to history and physical examination. Anesthetic plan, risks, benefits, alternatives, and personnel involved discussed with patient. Patient verbalized an understanding and agrees to proceed.       Karlene Pacheco MD  April 8, 2019  12:33 PM

## 2019-04-08 NOTE — OP NOTE
[address] [place] [address]  Endoscopy Note    Patient: Wei Schneider  : 1926  Acct#:     Procedure: Colonoscopy with intubation of the terminal ileum      Date:  2019    Surgeon:  Jad Velazquez MD    Referring Physician:  Dr. Maxx Mike    Anesthesia:  TIVA    Indications: This is a 80y.o. year old male who presents today with  Hematochezia. 80-year-old with a history of hypertension, hyperlipidemia, prostate cancer, inguinal hernia repair, history of radiation proctitis, chronic constipation. He takes Eliquis. He developed constipation followed by hematochezia. His last colonoscopy was in  and showed large internal hemorrhoids, no proctitis, and no diverticulosis. This was with Dr. Linus River. Labs today show urinalysis and 6 and creatinine 1.0.  CBC showed white blood count 5.9 hemoglobin 9.3 hematocrit 28 and platelets 731. Previous Colonoscopy: YES  Date:   Greater than 3 years: YES      Procedure: An informed consent was obtained from the patient after explanation of indications, benefits, possible risks and complications of the procedure. The patient was then taken to the endoscopy suite, placed in the left lateral decubitus position, and the above IV anesthesia was administered. A digital rectal examination was performed and revealed negative without mass, lesions or tenderness. The Olympus pediatric video colonoscope was placed in the patient's rectum under digital direction and advanced to the cecum. The cecum was identified by characteristic anatomy and ballottment. The prep was good. The ileocecal valve was identified and intubated. Retroflexed view of the cecum was normal.  The ascending colon was examined twice to assure no sessile polyps missed. The scope was then withdrawn back through the cecum, ascending, transverse, descending and sigmoid colons. Carefull circumferential examination of the mucosa in these areas was performed.  The scope was then withdrawn into the rectum and retroflexed. The scope was straightened, the colon was decompressed and the scope was withdrawn from the patient. Findings:  1. Normal Ileum  2. The colon as normal throughout. No blood in the stool. Yellow stool in the colon. 3.  There was radiation proctitis with a pale appearance with multiple non-bleeding angioectasias. No internal hemorrhoids. The patient tolerated the procedure well and was taken to Recovery in good condition. No complications. EBL: None  Specimens taken: none      Impression:   Radiation proctitis as likely cause of the hematochezia in setting of constipation. Recommendations:   1. Clear liquid diet, advance as tolerated. 2.  No need for repeat colonoscopy given age. 3.  Continue miralax to avoid constipation. Suspect the bleeding came from hard stool irritating the radiation proctitis. 4.  OK for discharge once hgb has stabilized. 5.  Since he had yellow stool in the colon without evidence of bleeding, will sign off for now. Please call with re-bleeding. I am okay with resumption of anti-coagulation tomorrow.     Celso Jones MD   [address]  4/8/2019

## 2019-04-08 NOTE — PROGRESS NOTES
Transportation here to take patient to pre-op. Removed patient hearing aides, placed them in container on bedside table. 1155- attempted to call report to pre-op, no RN was available at this time. Stated they would call back.

## 2019-04-08 NOTE — PROGRESS NOTES
Pt AAO x4. No c/o pain at this time. Assessment completed and charted. Pt is drinking Golytely. He is aware that he is NPO after MN. Stools are brown/yellow and watery. Pt is going to the [facility] frequently. IVF infusing without any difficulty. Wife at bedside. Denies needs at this time. Call light in reach. Will monitor.

## 2019-04-08 NOTE — PROGRESS NOTES
Patient returned to room [address] [phone]. VSS. Visitors at bedside. Call light within reach. Assisted patient to bedside to use urinal. No further needs at this time. Will continue to monitor.

## 2019-04-08 NOTE — H&P
[address] [place] [address]   Pre-operative History and Physical    Patient: Dominick Jacobsen  : 1926  Acct#:     HISTORY OF PRESENT ILLNESS:    The patient is a 80 y.o. male who presents with hematochezia. 80-year-old with a history of hypertension, hyperlipidemia, prostate cancer, inguinal hernia repair, history of radiation proctitis, chronic constipation. He takes Eliquis. He developed constipation followed by hematochezia. His last colonoscopy was in  and showed large internal hemorrhoids, no proctitis, and no diverticulosis. This was with Dr. Rosales Hoffman. Labs today show urinalysis and 6 and creatinine 1.0.  CBC showed white blood count 5.9 hemoglobin 9.3 hematocrit 28 and platelets 312. Past Medical History:        Diagnosis Date    Cancer [facility])     Prostate    Hemorrhoids     Hyperlipidemia     Hypertension     Influenza A 2014    Shortness of breath       Past Surgical History:        Procedure Laterality Date    CARPAL TUNNEL RELEASE Right 09/01/15    Right carpal tunnel release      CARPAL TUNNEL RELEASE Left 9/22/15    CYST REMOVAL      right wrist    ENDOSCOPY, COLON, DIAGNOSTIC      HERNIA REPAIR Right     inguinal    UPPER GASTROINTESTINAL ENDOSCOPY      with dilatation      Medications Prior to Admission:   No current facility-administered medications on file prior to encounter. Current Outpatient Medications on File Prior to Encounter   Medication Sig Dispense Refill    pravastatin (PRAVACHOL) 40 MG tablet TAKE ONE TABLET BY MOUTH DAILY 30 tablet 4    ELIQUIS 2.5 MG TABS tablet TAKE ONE TABLET BY MOUTH TWICE A DAY (Patient taking differently: No sig reported) 60 tablet 4    Multiple Minerals-Vitamins (PROSTEON PO) Take 2 tablets by mouth 2 times daily       magnesium hydroxide (MILK OF MAGNESIA) 400 MG/5ML suspension Take 30 mLs by mouth daily as needed for Constipation 1 Bottle 0        Allergies:  Patient has no known allergies.     Social History:   Social History     Socioeconomic History    Marital status:      Spouse name: Not on file    Number of children: 1    Years of education: Not on file    Highest education level: Not on file   Occupational History    Occupation: Retired   Social Needs    Financial resource strain: Not on file    Food insecurity:     Worry: Not on file     Inability: Not on file   the Shelf needs:     Medical: Not on file     Non-medical: Not on file   Tobacco Use    Smoking status: Former Smoker     Last attempt to quit: 1955     Years since quittin.6    Smokeless tobacco: Never Used   Substance and Sexual Activity    Alcohol use: No    Drug use: No    Sexual activity: Never   Lifestyle    Physical activity:     Days per week: Not on file     Minutes per session: Not on file    Stress: Not on file   Relationships    Social connections:     Talks on phone: Not on file     Gets together: Not on file     Attends Mandaen service: Not on file     Active member of club or organization: Not on file     Attends meetings of clubs or organizations: Not on file     Relationship status: Not on file    Intimate partner violence:     Fear of current or ex partner: Not on file     Emotionally abused: Not on file     Physically abused: Not on file     Forced sexual activity: Not on file   Other Topics Concern    Not on file   Social History Narrative    Not on file      Family History:       Problem Relation Age of Onset    Hypertension Other     Stroke Other         PHYSICAL EXAM:      BP (!) 178/77   Pulse 71   Temp 97.5 °F (36.4 °C) (Temporal)   Resp 16   Ht 5' 9\" (1.753 m)   Wt 153 lb 7 oz (69.6 kg)   SpO2 97%   BMI 22.66 kg/m²  I        Heart:  RRR    Lungs:  CTA b    Abdomen:  S/NT/ND/+BS      ASSESSMENT AND PLAN:  ASA: per anesthesia  Mallampati: per anesthesia  1. Patient is a 80 y.o. male here for colonoscopy   2.   Procedure options, risks and benefits reviewed with the patient. The patient expresses understanding.     Kal Alarcon

## 2019-04-09 ENCOUNTER — TELEPHONE (OUTPATIENT)
Dept: INTERNAL MEDICINE CLINIC | Age: 84
End: 2019-04-09

## 2019-04-09 ENCOUNTER — CARE COORDINATION (OUTPATIENT)
Dept: CASE MANAGEMENT | Age: 84
End: 2019-04-09

## 2019-04-09 DIAGNOSIS — E78.00 PURE HYPERCHOLESTEROLEMIA: Primary | ICD-10-CM

## 2019-04-09 PROCEDURE — 1111F DSCHRG MED/CURRENT MED MERGE: CPT

## 2019-04-09 NOTE — DISCHARGE SUMMARY
MiraLAX and avoid constipation. Patient does not need GI follow-up     Discharge Condition:  stable      Discharged to:  Home      Activity:   as tolerated:     Follow Up: Follow-up with PCP in 1-2 weeks             Labs: For convenience and continuity at follow-up the following most recent labs are provided:      CBC:   Lab Results   Component Value Date    WBC 5.9 2019    HGB 10.2 2019    HCT 28.0 2019     2019       RENAL:   Lab Results   Component Value Date     2019    K 4.5 2019    K 4.3 2019     2019    CO2 25 2019    BUN 6 2019    CREATININE 1.0 2019           Discharge Medications:    Tia Rdz   Home Medication Instructions G    Printed on:19 1208   Medication Information                      ELIQUIS 2.5 MG TABS tablet  TAKE ONE TABLET BY MOUTH TWICE A DAY             magnesium hydroxide (MILK OF MAGNESIA) 400 MG/5ML suspension  Take 30 mLs by mouth daily as needed for Constipation             Multiple Minerals-Vitamins (PROSTEON PO)  Take 2 tablets by mouth 2 times daily              polyethylene glycol (GLYCOLAX) packet  Take 17 g by mouth nightly             pravastatin (PRAVACHOL) 40 MG tablet  TAKE ONE TABLET BY MOUTH DAILY                    Time Spent on discharge is more than 30 min in the examination, evaluation, counseling and review of medications and discharge plan. Signed:  Elizabeth Gillespie MD   2019      Thank you Haleigh Henning MD for the opportunity to be involved in this patient's care. If you have any questions or concerns please feel free to contact me at [phone]. This note was transcribed using [id number]. Please disregard any translational errors.

## 2019-04-10 ENCOUNTER — NURSE TRIAGE (OUTPATIENT)
Dept: OTHER | Facility: CLINIC | Age: 84
End: 2019-04-10

## 2019-04-10 NOTE — TELEPHONE ENCOUNTER
Reason for Disposition   Skin growth or mole and bleeds    Protocols used: SKIN LESION - MOLES OR GROWTHS-ADULT-OH    Received call from [address]. Wife calling. Had skin tag on thigh and it began bleeding last night. On Eliquis. Covered with bandaid. It bled for a while. Not bleeding now. Wife asking if should go to ER. Since bleeding is under control, it does not appear to be infected, no bruising, ok to watch and wait. Keep covered until healed. Has appt with PCP for Hospital follow up 4/25/19. To call if has new sx or bleeds again and not able to stop but holding firm constant pressure for 10 minutes without releasing to check for bleeding.

## 2019-04-15 NOTE — ANESTHESIA POST-OP
[facility] Department of Anesthesiology  Post-Anesthesia Note       Name:  Desire Barrientos                                         Age:  80 y.o. MRN:  [id number]     Last Vitals & Oxygen Saturation: BP (!) 166/60   Pulse 83   Temp 98.3 °F (36.8 °C) (Oral)   Resp 16   Ht 5' 9\" (1.753 m)   Wt 153 lb 7 oz (69.6 kg)   SpO2 93%   BMI 22.66 kg/m²   No data found.     Level of consciousness: awake, alert and oriented    Respiratory: stable     Cardiovascular: stable     Hydration: stable     PONV: stable     Post-op pain: adequate analgesia    Post-op assessment: no apparent anesthetic complications    Complications:  none       Cehrie Hope MD  April 15, 2019   6:48 AM

## 2019-04-17 ENCOUNTER — OFFICE VISIT (OUTPATIENT)
Dept: INTERNAL MEDICINE CLINIC | Age: 84
End: 2019-04-17
Payer: MEDICARE

## 2019-04-17 VITALS
HEART RATE: 80 BPM | BODY MASS INDEX: 22.22 KG/M2 | HEIGHT: 69 IN | WEIGHT: 150 LBS | DIASTOLIC BLOOD PRESSURE: 60 MMHG | SYSTOLIC BLOOD PRESSURE: 120 MMHG | TEMPERATURE: 98.1 F

## 2019-04-17 DIAGNOSIS — Z09 HOSPITAL DISCHARGE FOLLOW-UP: ICD-10-CM

## 2019-04-17 DIAGNOSIS — K92.2 LOWER GI BLEED: Primary | ICD-10-CM

## 2019-04-17 DIAGNOSIS — D22.9 NEVUS: ICD-10-CM

## 2019-04-17 PROCEDURE — 1111F DSCHRG MED/CURRENT MED MERGE: CPT | Performed by: NURSE PRACTITIONER

## 2019-04-17 PROCEDURE — 99495 TRANSJ CARE MGMT MOD F2F 14D: CPT | Performed by: NURSE PRACTITIONER

## 2019-04-17 ASSESSMENT — PATIENT HEALTH QUESTIONNAIRE - PHQ9
SUM OF ALL RESPONSES TO PHQ9 QUESTIONS 1 & 2: 0
SUM OF ALL RESPONSES TO PHQ QUESTIONS 1-9: 0
1. LITTLE INTEREST OR PLEASURE IN DOING THINGS: 0
SUM OF ALL RESPONSES TO PHQ QUESTIONS 1-9: 0
2. FEELING DOWN, DEPRESSED OR HOPELESS: 0

## 2019-04-17 NOTE — PATIENT INSTRUCTIONS
DIET / EXERCISE    DRINK 3-4 CUPS OF WATER DAILY. TAKE ALL MEDICATIONS DAILY AS PRESCRIBED. CALL 911 OR GO TO THE NEAREST EMERGENCY ROOM FOR CHEST PAIN / SHORTNESS OF BREATH. RETURN IN 2 MONTH FOR F/U HTN / AS NEEDED. Patient Education        Gastrointestinal Bleeding: Care Instructions  Your Care Instructions    The digestive or gastrointestinal tract goes from the mouth to the anus. It is often called the GI tract. Bleeding can happen anywhere in the GI tract. It may be caused by an ulcer, an infection, or cancer. It may also be caused by medicines such as aspirin or ibuprofen. Light bleeding may not cause any symptoms at first. But if you continue to bleed for a while, you may feel very weak or tired. Sudden, heavy bleeding means you need to see a doctor right away. This kind of bleeding can be very dangerous. But it can usually be cured or controlled. The doctor may do some tests to find the cause of your bleeding. Follow-up care is a key part of your treatment and safety. Be sure to make and go to all appointments, and call your doctor if you are having problems. It's also a good idea to know your test results and keep a list of the medicines you take. How can you care for yourself at home? · Be safe with medicines. Take your medicines exactly as prescribed. Call your doctor if you think you are having a problem with your medicine. You will get more details on the specific medicines your doctor prescribes. · Do not take aspirin or other anti-inflammatory medicines, such as naproxen (Aleve) or ibuprofen (Advil, Motrin), without talking to your doctor first. Ask your doctor if it is okay to use acetaminophen (Tylenol). · Do not drink alcohol. · The bleeding may make you lose iron. So it's important to eat foods that have a lot of iron. These include red meat, shellfish, poultry, and eggs. They also include beans, raisins, whole-grain breads, and leafy green vegetables.  If you want help planning meals, you can make an appointment with a dietitian. When should you call for help? Call 911 anytime you think you may need emergency care. For example, call if:    · You have sudden, severe belly pain.     · You vomit blood or what looks like coffee grounds.     · You passed out (lost consciousness).     · Your stools are maroon or very bloody.    Call your doctor now or seek immediate medical care if:    · You are dizzy or lightheaded, or you feel like you may faint.     · Your stools are black and look like tar, or they have streaks of blood.     · You have belly pain.     · You vomit or have nausea.     · You have trouble swallowing, or it hurts when you swallow.    Watch closely for changes in your health, and be sure to contact your doctor if:    · You do not get better as expected. Where can you learn more? Go to https://BuyoopeGo World!eb.KuGou. org and sign in to your [employer] account. Enter I310 in the YouCastr box to learn more about \"Gastrointestinal Bleeding: Care Instructions. \"     If you do not have an account, please click on the \"Sign Up Now\" link. Current as of: September 23, 2018  Content Version: 11.9  © 7351-0701 [employer], Knotice. Care instructions adapted under license by [facility] ([facility]). If you have questions about a medical condition or this instruction, always ask your healthcare professional. [place] [address] any warranty or liability for your use of this information. Patient Education        Moles: Care Instructions  Your Care Instructions  Moles are skin growths made up of cells that produce color (pigment). A mole can appear anywhere on the skin, alone or in groups. Most people get a few moles during their first 20 years of life. They are usually brown in color but can be blue, black, or flesh-colored. Most moles are harmless and do not cause pain or other symptoms, unless you rub them or they bump against something.   You usually do not need treatment for moles. But some can turn into cancer. Talk to your doctor if a mole bleeds, itches, burns, or changes size or color. Also let your doctor know if you get a new mole. Make sure to wear sunscreen and other sun protection every day to help prevent skin cancer. Follow-up care is a key part of your treatment and safety. Be sure to make and go to all appointments, and call your doctor if you are having problems. It's also a good idea to know your test results and keep a list of the medicines you take. How can you care for yourself at home? · Check all the skin on your body once a month for skin growths or other changes, such as in the color and feel of the skin. ?  front of a full-length mirror. Look carefully at the front and back of your body. Then look at your right and left sides with your arms raised. ? Bend your elbows and look carefully at your forearms, the back of your upper arms, and your palms. ? Look at your feet, the bottoms of your feet, and the spaces between your toes. ? Use a hand mirror to look at the back of your legs, the back of your neck, and your back, rear end (buttocks), and genital area. Part the hair on your head to look at your scalp. · If you see a change in a skin growth, contact your doctor. Look for:  ? A mole that bleeds. ? A fast-growing mole. ? A scaly or crusted growth on the skin. ? A sore that will not heal.  To prevent skin cancer  · Always wear sunscreen on exposed skin. Make sure to use a broad-spectrum sunscreen that has a sun protection factor (SPF) of 30 or higher. Use it every day, even when it is cloudy. · Wear a wide-brimmed hat and long sleeves and pants if you are going to be outdoors for very long. · Avoid the sun between 10 a.m. and 4 p.m., which is the peak time for the sun's ultraviolet rays. · Avoid sunburns, tanning booths, and sunlamps. · Be sure to protect children from the sun.  Sunburns in childhood damage the skin and increase the risk of cancer. When should you call for help? Watch closely for changes in your health, and be sure to contact your doctor if:    · A mole looks different than it did before. It may have changed in size, color, shape, or the way it looks. Where can you learn more? Go to https://chpepiceweb.Enthrill Distribution. org and sign in to your [employer] account. Enter A886 in the ClassBadges box to learn more about \"Moles: Care Instructions. \"     If you do not have an account, please click on the \"Sign Up Now\" link. Current as of: April 17, 2018  Content Version: 11.9  © 6853-8782 [employer]. Care instructions adapted under license by [facility] ([facility]). If you have questions about a medical condition or this instruction, always ask your healthcare professional. [address] any warranty or liability for your use of this information. Patient Education        DASH Diet: Care Instructions  Your Care Instructions    The DASH diet is an eating plan that can help lower your blood pressure. DASH stands for Dietary Approaches to Stop Hypertension. Hypertension is high blood pressure. The DASH diet focuses on eating foods that are high in calcium, potassium, and magnesium. These nutrients can lower blood pressure. The foods that are highest in these nutrients are fruits, vegetables, low-fat dairy products, nuts, seeds, and legumes. But taking calcium, potassium, and magnesium supplements instead of eating foods that are high in those nutrients does not have the same effect. The DASH diet also includes whole grains, fish, and poultry. The DASH diet is one of several lifestyle changes your doctor may recommend to lower your high blood pressure. Your doctor may also want you to decrease the amount of sodium in your diet. Lowering sodium while following the DASH diet can lower blood pressure even further than just the DASH diet alone.   Follow-up care is a key part of your treatment and safety. Be sure to make and go to all appointments, and call your doctor if you are having problems. It's also a good idea to know your test results and keep a list of the medicines you take. How can you care for yourself at home? Following the DASH diet  · Eat 4 to 5 servings of fruit each day. A serving is 1 medium-sized piece of fruit, ½ cup chopped or canned fruit, 1/4 cup dried fruit, or 4 ounces (½ cup) of fruit juice. Choose fruit more often than fruit juice. · Eat 4 to 5 servings of vegetables each day. A serving is 1 cup of lettuce or raw leafy vegetables, ½ cup of chopped or cooked vegetables, or 4 ounces (½ cup) of vegetable juice. Choose vegetables more often than vegetable juice. · Get 2 to 3 servings of low-fat and fat-free dairy each day. A serving is 8 ounces of milk, 1 cup of yogurt, or 1 ½ ounces of cheese. · Eat 6 to 8 servings of grains each day. A serving is 1 slice of bread, 1 ounce of dry cereal, or ½ cup of cooked rice, pasta, or cooked cereal. Try to choose whole-grain products as much as possible. · Limit lean meat, poultry, and fish to 2 servings each day. A serving is 3 ounces, about the size of a deck of cards. · Eat 4 to 5 servings of nuts, seeds, and legumes (cooked dried beans, lentils, and split peas) each week. A serving is 1/3 cup of nuts, 2 tablespoons of seeds, or ½ cup of cooked beans or peas. · Limit fats and oils to 2 to 3 servings each day. A serving is 1 teaspoon of vegetable oil or 2 tablespoons of salad dressing. · Limit sweets and added sugars to 5 servings or less a week. A serving is 1 tablespoon jelly or jam, ½ cup sorbet, or 1 cup of lemonade. · Eat less than 2,300 milligrams (mg) of sodium a day. If you limit your sodium to 1,500 mg a day, you can lower your blood pressure even more. Tips for success  · Start small. Do not try to make dramatic changes to your diet all at once.  You might feel that you are missing out on your favorite foods and then be more likely to not follow the plan. Make small changes, and stick with them. Once those changes become habit, add a few more changes. · Try some of the following:  ? Make it a goal to eat a fruit or vegetable at every meal and at snacks. This will make it easy to get the recommended amount of fruits and vegetables each day. ? Try yogurt topped with fruit and nuts for a snack or healthy dessert. ? Add lettuce, tomato, cucumber, and onion to sandwiches. ? Combine a ready-made pizza crust with low-fat mozzarella cheese and lots of vegetable toppings. Try using tomatoes, squash, spinach, broccoli, carrots, cauliflower, and onions. ? Have a variety of cut-up vegetables with a low-fat dip as an appetizer instead of chips and dip. ? Sprinkle sunflower seeds or chopped almonds over salads. Or try adding chopped walnuts or almonds to cooked vegetables. ? Try some vegetarian meals using beans and peas. Add garbanzo or kidney beans to salads. Make burritos and tacos with mashed tidwell beans or black beans. Where can you learn more? Go to https://[facility].Viewsy. org and sign in to your [employer]os Software account. Enter P503 in the [facility] box to learn more about \"DASH Diet: Care Instructions. \"     If you do not have an account, please click on the \"Sign Up Now\" link. Current as of: July 22, 2018  Content Version: 11.9  © 1705-0642 MobileTag, Catchpoint Systems. Care instructions adapted under license by [facility] ([facility]). If you have questions about a medical condition or this instruction, always ask your healthcare professional. [place] [address] any warranty or liability for your use of this information. Patient Education        High Blood Pressure: Care Instructions  Overview    It's normal for blood pressure to go up and down throughout the day. But if it stays up, you have high blood pressure.  Another name for high blood pressure is hypertension. Despite what a lot of people think, high blood pressure usually doesn't cause headaches or make you feel dizzy or lightheaded. It usually has no symptoms. But it does increase your risk of stroke, heart attack, and other problems. You and your doctor will talk about your risks of these problems based on your blood pressure. Your doctor will give you a goal for your blood pressure. Your goal will be based on your health and your age. Lifestyle changes, such as eating healthy and being active, are always important to help lower blood pressure. You might also take medicine to reach your blood pressure goal.  Follow-up care is a key part of your treatment and safety. Be sure to make and go to all appointments, and call your doctor if you are having problems. It's also a good idea to know your test results and keep a list of the medicines you take. How can you care for yourself at home? Medical treatment  · If you stop taking your medicine, your blood pressure will go back up. You may take one or more types of medicine to lower your blood pressure. Be safe with medicines. Take your medicine exactly as prescribed. Call your doctor if you think you are having a problem with your medicine. · Talk to your doctor before you start taking aspirin every day. Aspirin can help certain people lower their risk of a heart attack or stroke. But taking aspirin isn't right for everyone, because it can cause serious bleeding. · See your doctor regularly. You may need to see the doctor more often at first or until your blood pressure comes down. · If you are taking blood pressure medicine, talk to your doctor before you take decongestants or anti-inflammatory medicine, such as ibuprofen. Some of these medicines can raise blood pressure. · Learn how to check your blood pressure at home. Lifestyle changes  · Stay at a healthy weight.  This is especially important if you put on weight around the waist. Losing even 10 pounds can help you lower your blood pressure. · If your doctor recommends it, get more exercise. Walking is a good choice. Bit by bit, increase the amount you walk every day. Try for at least 30 minutes on most days of the week. You also may want to swim, bike, or do other activities. · Avoid or limit alcohol. Talk to your doctor about whether you can drink any alcohol. · Try to limit how much sodium you eat to less than 2,300 milligrams (mg) a day. Your doctor may ask you to try to eat less than 1,500 mg a day. · Eat plenty of fruits (such as bananas and oranges), vegetables, legumes, whole grains, and low-fat dairy products. · Lower the amount of saturated fat in your diet. Saturated fat is found in animal products such as milk, cheese, and meat. Limiting these foods may help you lose weight and also lower your risk for heart disease. · Do not smoke. Smoking increases your risk for heart attack and stroke. If you need help quitting, talk to your doctor about stop-smoking programs and medicines. These can increase your chances of quitting for good. When should you call for help? Call 911 anytime you think you may need emergency care. This may mean having symptoms that suggest that your blood pressure is causing a serious heart or blood vessel problem. Your blood pressure may be over 180/120.   For example, call 911 if:    · You have symptoms of a heart attack. These may include:  ? Chest pain or pressure, or a strange feeling in the chest.  ? Sweating. ? Shortness of breath. ? Nausea or vomiting. ? Pain, pressure, or a strange feeling in the back, neck, jaw, or upper belly or in one or both shoulders or arms. ? Lightheadedness or sudden weakness. ? A fast or irregular heartbeat.     · You have symptoms of a stroke. These may include:  ? Sudden numbness, tingling, weakness, or loss of movement in your face, arm, or leg, especially on only one side of your body. ? Sudden vision changes.   ? Sudden trouble speaking. ? Sudden confusion or trouble understanding simple statements. ? Sudden problems with walking or balance. ? A sudden, severe headache that is different from past headaches.     · You have severe back or belly pain.    Do not wait until your blood pressure comes down on its own. Get help right away.   Call your doctor now or seek immediate care if:    · Your blood pressure is much higher than normal (such as 180/120 or higher), but you don't have symptoms.     · You think high blood pressure is causing symptoms, such as:  ? Severe headache.  ? Blurry vision.    Watch closely for changes in your health, and be sure to contact your doctor if:    · Your blood pressure measures higher than your doctor recommends at least 2 times. That means the top number is higher or the bottom number is higher, or both.     · You think you may be having side effects from your blood pressure medicine. Where can you learn more? Go to https://Local MotorspeKlout.GeekStatus. org and sign in to your MonCV.com account. Enter Y871 in the bttn box to learn more about \"High Blood Pressure: Care Instructions. \"     If you do not have an account, please click on the \"Sign Up Now\" link. Current as of: July 22, 2018  Content Version: 11.9  © 0370-4899 [employer], [employer]. Care instructions adapted under license by [facility] ([facility]). If you have questions about a medical condition or this instruction, always ask your healthcare professional. [place] [address] any warranty or liability for your use of this information.

## 2019-04-17 NOTE — PROGRESS NOTES
Post-Discharge Transitional Care Management Services or Hospital Follow Up      Ana Rosa Reed   YOB: 1926    Date of Office Visit:  4/17/2019  Date of Hospital Admission: 4/6/19  Date of Hospital Discharge: 4/8/19  Risk of hospital readmission (high >=14%.  Medium >=10%) :Readmission Risk Score: 15      Care management risk score Rising risk (score 2-5) and Complex Care (Scores >=6): 5     Non face to face  following discharge, date last encounter closed (first attempt may have been earlier): 4/9/2019  3:50 PM    Call initiated 2 business days of discharge: Yes    Patient Active Problem List   Diagnosis    Hyperlipidemia    HTN (hypertension)    Neck pain    Abdominal pain    Abdominal pain, other specified site    Dysphagia    Esophagitis    Flu vaccine need    Prostate cancer ([address]    Bilateral hand pain    Carpal tunnel syndrome of right wrist    Iron deficiency anemia due to chronic blood loss    Arthritis of left knee    Paroxysmal atrial fibrillation ([address]    Restrictive airway disease    Symptomatic bradycardia    Syncope and collapse    Subdural hematoma (HCC)    GI bleed       No Known Allergies    Medications listed as ordered at the time of discharge from [facility]   Home Medication Instructions RICHARD:    Printed on:04/17/19 1223   Medication Information                      ELIQUIS 2.5 MG TABS tablet  TAKE ONE TABLET BY MOUTH TWICE A DAY             magnesium hydroxide (MILK OF MAGNESIA) 400 MG/5ML suspension  Take 30 mLs by mouth daily as needed for Constipation             Multiple Minerals-Vitamins (PROSTEON PO)  Take 2 tablets by mouth 2 times daily              polyethylene glycol (GLYCOLAX) packet  Take 17 g by mouth nightly             pravastatin (PRAVACHOL) 40 MG tablet  TAKE ONE TABLET BY MOUTH DAILY                   Medications marked \"taking\" at this time  Outpatient Medications Marked as Taking for the 4/17/19 encounter (Office Visit) with VALDEMAR Noguera - CNP   Medication Sig Dispense Refill    polyethylene glycol (GLYCOLAX) packet Take 17 g by mouth nightly 527 g 1    pravastatin (PRAVACHOL) 40 MG tablet TAKE ONE TABLET BY MOUTH DAILY 30 tablet 4    ELIQUIS 2.5 MG TABS tablet TAKE ONE TABLET BY MOUTH TWICE A DAY (Patient taking differently: No sig reported) 60 tablet 4    magnesium hydroxide (MILK OF MAGNESIA) 400 MG/5ML suspension Take 30 mLs by mouth daily as needed for Constipation 1 Bottle 0    Multiple Minerals-Vitamins (PROSTEON PO) Take 2 tablets by mouth 2 times daily           Medications patient taking as of now reconciled against medications ordered at time of hospital discharge: Yes    Chief Complaint   Patient presents with    Follow-Up from [address] [place] [address]     patient here for hospital followup       History of Present illness - Follow up of Hospital diagnosis(es):  Lower GI Bleed    Inpatient course: Discharge summary reviewed- see chart. Interval history/Current status:Hospital f/u for  Lower GI bleed. A comprehensive review of systems was negative. Vitals:    04/17/19 1138   BP: 120/60   Site: Right Upper Arm   Position: Sitting   Cuff Size: Small Adult   Pulse: 80   Temp: 98.1 °F (36.7 °C)   TempSrc: Oral   Weight: 150 lb (68 kg)   Height: 5' 9\" (1.753 m)     Body mass index is 22.15 kg/m².    Wt Readings from Last 3 Encounters:   04/17/19 150 lb (68 kg)   04/08/19 153 lb 7 oz (69.6 kg)   03/26/19 153 lb (69.4 kg)     BP Readings from Last 3 Encounters:   04/17/19 120/60   04/08/19 (!) 166/60   03/26/19 (!) 140/70      Orders Placed This Encounter   Procedures   Vinod Danielson MD, Dermatology, [facility]     Referral Priority:   Routine     Referral Type:   Eval and Treat     Referral Reason:   Specialty Services Required     Referred to Provider:   Kimani Duran MD     Requested Specialty:   Dermatology     Number of Visits Requested:   1   Donna Sumner MD, Gastroenterology, [facility]     Referral Priority:   Routine     Referral Type:   Eval and Treat     Referral Reason:   Specialty Services Required     Referred to Provider:   Sundar Torre MD     Requested Specialty:   Gastroenterology     Number of Visits Requested:   1    MT DISCHARGE MEDS RECONCILED W/ CURRENT OUTPATIENT MED LIST     Physical Exam:  ENT: tympanic membrane, external ear and ear canal normal bilaterally, nose without deformity, nasal mucosa and turbinates normal without polyps  Pulmonary/Chest: clear to auscultation bilaterally- no wheezes, rales or rhonchi, normal air movement, no respiratory distress  Neurologic: reflexes normal and symmetric, no cranial nerve deficit, gait, coordination and speech normal  Denies any abdominal discomfort / bloating/ flatulence / N/V/D, rectal bleeding at present. Assessment/Plan:    1. Lower GI Bleed    MT DISCHARGE MEDS RECONCILED W/ CURRENT OUTPATIENT MED LIST  -JAIRO Jaquez MD, Miller Valderrama MD, DERMATOLOGY, [facility]    3. Hospital discharge follow up        Medical Decision Making: Moderate    DIET / EXERCISE    DRINK 3-4 CUPS OF WATER DAILY. TAKE ALL MEDICATIONS DAILY AS PRESCRIBED. PATIENT TO SCHEDULE APPOINTMENT WITH GASTROENTEROLOGY / DERMATOLOGY. CALL 911 OR GO TO THE NEAREST EMERGENCY ROOM FOR CHEST PAIN / SHORTNESS OF BREATH. RETURN IN 2 MONTH FOR F/U HTN / AS NEEDED.

## 2019-04-23 ENCOUNTER — CARE COORDINATION (OUTPATIENT)
Dept: CASE MANAGEMENT | Age: 84
End: 2019-04-23

## 2019-05-30 ENCOUNTER — OFFICE VISIT (OUTPATIENT)
Dept: INTERNAL MEDICINE CLINIC | Age: 84
End: 2019-05-30
Payer: MEDICARE

## 2019-05-30 VITALS
WEIGHT: 152.2 LBS | BODY MASS INDEX: 22.54 KG/M2 | SYSTOLIC BLOOD PRESSURE: 116 MMHG | HEART RATE: 80 BPM | TEMPERATURE: 97.9 F | DIASTOLIC BLOOD PRESSURE: 60 MMHG | HEIGHT: 69 IN

## 2019-05-30 DIAGNOSIS — D50.0 IRON DEFICIENCY ANEMIA DUE TO CHRONIC BLOOD LOSS: ICD-10-CM

## 2019-05-30 DIAGNOSIS — I10 ESSENTIAL HYPERTENSION: Primary | ICD-10-CM

## 2019-05-30 LAB
HCT VFR BLD CALC: 32.9 % (ref 40.5–52.5)
HEMOGLOBIN: 11 G/DL (ref 13.5–17.5)
MCH RBC QN AUTO: 31.9 PG (ref 26–34)
MCHC RBC AUTO-ENTMCNC: 33.5 G/DL (ref 31–36)
MCV RBC AUTO: 95.4 FL (ref 80–100)
PDW BLD-RTO: 14.9 % (ref 12.4–15.4)
PLATELET # BLD: 176 K/UL (ref 135–450)
PMV BLD AUTO: 9.8 FL (ref 5–10.5)
RBC # BLD: 3.45 M/UL (ref 4.2–5.9)
WBC # BLD: 5.6 K/UL (ref 4–11)

## 2019-05-30 PROCEDURE — G8420 CALC BMI NORM PARAMETERS: HCPCS | Performed by: INTERNAL MEDICINE

## 2019-05-30 PROCEDURE — G8427 DOCREV CUR MEDS BY ELIG CLIN: HCPCS | Performed by: INTERNAL MEDICINE

## 2019-05-30 PROCEDURE — 99214 OFFICE O/P EST MOD 30 MIN: CPT | Performed by: INTERNAL MEDICINE

## 2019-05-30 PROCEDURE — 1036F TOBACCO NON-USER: CPT | Performed by: INTERNAL MEDICINE

## 2019-05-30 PROCEDURE — 4040F PNEUMOC VAC/ADMIN/RCVD: CPT | Performed by: INTERNAL MEDICINE

## 2019-05-30 PROCEDURE — 1123F ACP DISCUSS/DSCN MKR DOCD: CPT | Performed by: INTERNAL MEDICINE

## 2019-05-30 ASSESSMENT — ENCOUNTER SYMPTOMS
CHANGE IN BOWEL HABIT: 1
RESPIRATORY NEGATIVE: 1
EYES NEGATIVE: 1

## 2019-05-30 ASSESSMENT — PATIENT HEALTH QUESTIONNAIRE - PHQ9
SUM OF ALL RESPONSES TO PHQ QUESTIONS 1-9: 0
SUM OF ALL RESPONSES TO PHQ QUESTIONS 1-9: 0
1. LITTLE INTEREST OR PLEASURE IN DOING THINGS: 0
2. FEELING DOWN, DEPRESSED OR HOPELESS: 0
SUM OF ALL RESPONSES TO PHQ9 QUESTIONS 1 & 2: 0

## 2019-05-30 NOTE — PROGRESS NOTES
Subjective:      Patient ID: Misha Alvarado is a 80 y.o. male. Anemia  Has been since GI bleed last month. Stomach feels good now. Hyperlipidemia   This is a chronic problem. The problem is controlled. Recent lipid tests were reviewed and are normal. There are no known factors aggravating his hyperlipidemia. Current antihyperlipidemic treatment includes exercise and statins. The current treatment provides significant improvement of lipids. Dizziness   This is a chronic problem. The problem occurs intermittently. The problem has been unchanged. Associated symptoms include a change in bowel habit. He has tried nothing for the symptoms. The treatment provided significant relief. Review of Systems   Constitutional: Negative. HENT: Negative. Eyes: Negative. Respiratory: Negative. Cardiovascular: Negative. Gastrointestinal: Positive for change in bowel habit. Genitourinary: Negative. Musculoskeletal: Negative. Skin: Negative. Neurological: Positive for dizziness. Psychiatric/Behavioral: Negative. Objective:   Physical Exam   Constitutional: He is oriented to person, place, and time. He appears well-developed and well-nourished. HENT:   Head: Normocephalic and atraumatic. Left Ear: External ear normal.   Eyes: Pupils are equal, round, and reactive to light. Conjunctivae and EOM are normal.   Neck: Normal range of motion. Neck supple. No thyromegaly present. Cardiovascular: Normal rate, regular rhythm and normal heart sounds. No murmur heard. Pulmonary/Chest: Effort normal and breath sounds normal. No respiratory distress. He has no wheezes. He has no rales. Abdominal: Soft. Bowel sounds are normal.   Musculoskeletal: Normal range of motion. Neurological: He is alert and oriented to person, place, and time. Skin: Skin is warm and dry. Psychiatric: He has a normal mood and affect.      Current Outpatient Medications on File Prior to Visit   Medication Sig Dispense Refill    pravastatin (PRAVACHOL) 40 MG tablet TAKE ONE TABLET BY MOUTH DAILY 30 tablet 4    ELIQUIS 2.5 MG TABS tablet TAKE ONE TABLET BY MOUTH TWICE A DAY (Patient taking differently: No sig reported) 60 tablet 4    magnesium hydroxide (MILK OF MAGNESIA) 400 MG/5ML suspension Take 30 mLs by mouth daily as needed for Constipation 1 Bottle 0    Multiple Minerals-Vitamins (PROSTEON PO) Take 2 tablets by mouth 2 times daily        No current facility-administered medications on file prior to visit.         Assessment:     hyperlipidemia on meds    New anemia from GI bleed    Dizziness unchanged      Plan:       continue meds   refills    Labs    Consider iron supplement    Return in 3 months     BERNARDO De Leon MD

## 2019-05-31 LAB
IRON SATURATION: 29 % (ref 20–50)
IRON: 92 UG/DL (ref 59–158)
TOTAL IRON BINDING CAPACITY: 319 UG/DL (ref 260–445)

## 2019-07-11 DIAGNOSIS — E78.00 PURE HYPERCHOLESTEROLEMIA: ICD-10-CM

## 2019-07-12 RX ORDER — PRAVASTATIN SODIUM 40 MG
TABLET ORAL
Qty: 90 TABLET | Refills: 3 | Status: SHIPPED | OUTPATIENT
Start: 2019-07-12 | End: 2020-06-08 | Stop reason: ALTCHOICE

## 2019-07-17 RX ORDER — APIXABAN 2.5 MG/1
TABLET, FILM COATED ORAL
Qty: 60 TABLET | Refills: 3 | Status: SHIPPED | OUTPATIENT
Start: 2019-07-17 | End: 2019-12-02 | Stop reason: SDUPTHER

## 2019-08-13 ENCOUNTER — OFFICE VISIT (OUTPATIENT)
Dept: INTERNAL MEDICINE CLINIC | Age: 84
End: 2019-08-13
Payer: MEDICARE

## 2019-08-13 VITALS
WEIGHT: 152 LBS | DIASTOLIC BLOOD PRESSURE: 56 MMHG | HEART RATE: 73 BPM | OXYGEN SATURATION: 98 % | SYSTOLIC BLOOD PRESSURE: 116 MMHG | HEIGHT: 66 IN | BODY MASS INDEX: 24.43 KG/M2

## 2019-08-13 DIAGNOSIS — I10 ESSENTIAL HYPERTENSION: Primary | ICD-10-CM

## 2019-08-13 DIAGNOSIS — R06.09 DYSPNEA ON EXERTION: ICD-10-CM

## 2019-08-13 DIAGNOSIS — E78.00 PURE HYPERCHOLESTEROLEMIA: ICD-10-CM

## 2019-08-13 PROCEDURE — 99214 OFFICE O/P EST MOD 30 MIN: CPT | Performed by: INTERNAL MEDICINE

## 2019-08-13 PROCEDURE — G8427 DOCREV CUR MEDS BY ELIG CLIN: HCPCS | Performed by: INTERNAL MEDICINE

## 2019-08-13 PROCEDURE — 1036F TOBACCO NON-USER: CPT | Performed by: INTERNAL MEDICINE

## 2019-08-13 PROCEDURE — 1123F ACP DISCUSS/DSCN MKR DOCD: CPT | Performed by: INTERNAL MEDICINE

## 2019-08-13 PROCEDURE — G8420 CALC BMI NORM PARAMETERS: HCPCS | Performed by: INTERNAL MEDICINE

## 2019-08-13 PROCEDURE — 4040F PNEUMOC VAC/ADMIN/RCVD: CPT | Performed by: INTERNAL MEDICINE

## 2019-08-13 RX ORDER — ALBUTEROL SULFATE 90 UG/1
2 AEROSOL, METERED RESPIRATORY (INHALATION) 4 TIMES DAILY PRN
Qty: 3 INHALER | Refills: 1 | Status: SHIPPED | OUTPATIENT
Start: 2019-08-13

## 2019-08-13 ASSESSMENT — PATIENT HEALTH QUESTIONNAIRE - PHQ9
SUM OF ALL RESPONSES TO PHQ9 QUESTIONS 1 & 2: 0
1. LITTLE INTEREST OR PLEASURE IN DOING THINGS: 0
SUM OF ALL RESPONSES TO PHQ QUESTIONS 1-9: 0
2. FEELING DOWN, DEPRESSED OR HOPELESS: 0
SUM OF ALL RESPONSES TO PHQ QUESTIONS 1-9: 0

## 2019-08-13 ASSESSMENT — ENCOUNTER SYMPTOMS
SHORTNESS OF BREATH: 1
GASTROINTESTINAL NEGATIVE: 1
EYES NEGATIVE: 1

## 2019-09-17 ENCOUNTER — OFFICE VISIT (OUTPATIENT)
Dept: FAMILY MEDICINE CLINIC | Age: 84
End: 2019-09-17
Payer: MEDICARE

## 2019-09-17 VITALS
DIASTOLIC BLOOD PRESSURE: 50 MMHG | BODY MASS INDEX: 24.53 KG/M2 | OXYGEN SATURATION: 97 % | SYSTOLIC BLOOD PRESSURE: 124 MMHG | WEIGHT: 150.8 LBS | HEART RATE: 86 BPM

## 2019-09-17 DIAGNOSIS — C61 PROSTATE CANCER (HCC): ICD-10-CM

## 2019-09-17 DIAGNOSIS — Z76.89 ENCOUNTER TO ESTABLISH CARE: Primary | ICD-10-CM

## 2019-09-17 DIAGNOSIS — I48.0 PAROXYSMAL ATRIAL FIBRILLATION (HCC): ICD-10-CM

## 2019-09-17 PROCEDURE — 99204 OFFICE O/P NEW MOD 45 MIN: CPT | Performed by: FAMILY MEDICINE

## 2019-09-17 ASSESSMENT — ENCOUNTER SYMPTOMS
SORE THROAT: 0
VOMITING: 0
BLOOD IN STOOL: 0
PHOTOPHOBIA: 0
BACK PAIN: 0
CHEST TIGHTNESS: 0
DIARRHEA: 0
CONSTIPATION: 0
SHORTNESS OF BREATH: 0
ABDOMINAL PAIN: 0
RHINORRHEA: 0
COUGH: 0
TROUBLE SWALLOWING: 0
NAUSEA: 0

## 2019-09-17 NOTE — PATIENT INSTRUCTIONS
Contact your dentist regarding your bleeding gums -- this is likely from the blood thinner Eliquis but healthy teeth / gums can help reduce the risk of this

## 2019-10-01 ENCOUNTER — OFFICE VISIT (OUTPATIENT)
Dept: CARDIOLOGY CLINIC | Age: 84
End: 2019-10-01
Payer: MEDICARE

## 2019-10-01 VITALS
BODY MASS INDEX: 22.07 KG/M2 | OXYGEN SATURATION: 98 % | HEIGHT: 69 IN | HEART RATE: 76 BPM | SYSTOLIC BLOOD PRESSURE: 120 MMHG | DIASTOLIC BLOOD PRESSURE: 70 MMHG | WEIGHT: 149 LBS

## 2019-10-01 DIAGNOSIS — E78.2 MIXED HYPERLIPIDEMIA: ICD-10-CM

## 2019-10-01 DIAGNOSIS — R55 SYNCOPE, UNSPECIFIED SYNCOPE TYPE: ICD-10-CM

## 2019-10-01 DIAGNOSIS — I48.0 PAROXYSMAL ATRIAL FIBRILLATION (HCC): Primary | ICD-10-CM

## 2019-10-01 DIAGNOSIS — I51.89 DYNAMIC LEFT VENTRICULAR OUTFLOW OBSTRUCTION: ICD-10-CM

## 2019-10-01 DIAGNOSIS — I10 ESSENTIAL HYPERTENSION: ICD-10-CM

## 2019-10-01 PROCEDURE — 93000 ELECTROCARDIOGRAM COMPLETE: CPT | Performed by: INTERNAL MEDICINE

## 2019-10-01 PROCEDURE — G8420 CALC BMI NORM PARAMETERS: HCPCS | Performed by: INTERNAL MEDICINE

## 2019-10-01 PROCEDURE — 99213 OFFICE O/P EST LOW 20 MIN: CPT | Performed by: INTERNAL MEDICINE

## 2019-10-01 PROCEDURE — 1036F TOBACCO NON-USER: CPT | Performed by: INTERNAL MEDICINE

## 2019-10-01 PROCEDURE — G8427 DOCREV CUR MEDS BY ELIG CLIN: HCPCS | Performed by: INTERNAL MEDICINE

## 2019-10-01 PROCEDURE — 4040F PNEUMOC VAC/ADMIN/RCVD: CPT | Performed by: INTERNAL MEDICINE

## 2019-10-01 PROCEDURE — 1123F ACP DISCUSS/DSCN MKR DOCD: CPT | Performed by: INTERNAL MEDICINE

## 2019-10-01 PROCEDURE — G8484 FLU IMMUNIZE NO ADMIN: HCPCS | Performed by: INTERNAL MEDICINE

## 2019-11-04 ENCOUNTER — TELEPHONE (OUTPATIENT)
Dept: CARDIOLOGY CLINIC | Age: 84
End: 2019-11-04

## 2019-11-04 ENCOUNTER — TELEPHONE (OUTPATIENT)
Dept: FAMILY MEDICINE CLINIC | Age: 84
End: 2019-11-04

## 2019-11-07 ENCOUNTER — OFFICE VISIT (OUTPATIENT)
Dept: FAMILY MEDICINE CLINIC | Age: 84
End: 2019-11-07
Payer: MEDICARE

## 2019-11-07 VITALS
BODY MASS INDEX: 22.24 KG/M2 | WEIGHT: 150.6 LBS | SYSTOLIC BLOOD PRESSURE: 112 MMHG | HEART RATE: 82 BPM | TEMPERATURE: 98.6 F | OXYGEN SATURATION: 93 % | DIASTOLIC BLOOD PRESSURE: 60 MMHG

## 2019-11-07 DIAGNOSIS — B96.89 ACUTE BACTERIAL SINUSITIS: Primary | ICD-10-CM

## 2019-11-07 DIAGNOSIS — J01.90 ACUTE BACTERIAL SINUSITIS: Primary | ICD-10-CM

## 2019-11-07 PROCEDURE — 99213 OFFICE O/P EST LOW 20 MIN: CPT | Performed by: FAMILY MEDICINE

## 2019-11-07 RX ORDER — AMOXICILLIN AND CLAVULANATE POTASSIUM 875; 125 MG/1; MG/1
1 TABLET, FILM COATED ORAL 2 TIMES DAILY
Qty: 14 TABLET | Refills: 0 | Status: SHIPPED | OUTPATIENT
Start: 2019-11-07 | End: 2019-11-14

## 2019-11-07 ASSESSMENT — ENCOUNTER SYMPTOMS
SINUS PRESSURE: 1
TROUBLE SWALLOWING: 0
SORE THROAT: 0
SWOLLEN GLANDS: 0
SHORTNESS OF BREATH: 0
COUGH: 1
CHEST TIGHTNESS: 0
WHEEZING: 0
HOARSE VOICE: 0
SINUS COMPLAINT: 1
RHINORRHEA: 0
SINUS PAIN: 1

## 2019-12-17 ENCOUNTER — OFFICE VISIT (OUTPATIENT)
Dept: FAMILY MEDICINE CLINIC | Age: 84
End: 2019-12-17
Payer: MEDICARE

## 2019-12-17 VITALS
BODY MASS INDEX: 23.05 KG/M2 | OXYGEN SATURATION: 97 % | HEART RATE: 93 BPM | DIASTOLIC BLOOD PRESSURE: 80 MMHG | HEIGHT: 69 IN | WEIGHT: 155.6 LBS | SYSTOLIC BLOOD PRESSURE: 120 MMHG

## 2019-12-17 DIAGNOSIS — Z23 NEED FOR INFLUENZA VACCINATION: ICD-10-CM

## 2019-12-17 DIAGNOSIS — Z00.00 ROUTINE GENERAL MEDICAL EXAMINATION AT A HEALTH CARE FACILITY: Primary | ICD-10-CM

## 2019-12-17 PROCEDURE — 90653 IIV ADJUVANT VACCINE IM: CPT | Performed by: FAMILY MEDICINE

## 2019-12-17 PROCEDURE — G0008 ADMIN INFLUENZA VIRUS VAC: HCPCS | Performed by: FAMILY MEDICINE

## 2019-12-17 PROCEDURE — G0439 PPPS, SUBSEQ VISIT: HCPCS | Performed by: FAMILY MEDICINE

## 2019-12-17 ASSESSMENT — LIFESTYLE VARIABLES: HOW OFTEN DO YOU HAVE A DRINK CONTAINING ALCOHOL: 0

## 2019-12-17 ASSESSMENT — PATIENT HEALTH QUESTIONNAIRE - PHQ9
SUM OF ALL RESPONSES TO PHQ QUESTIONS 1-9: 1
SUM OF ALL RESPONSES TO PHQ QUESTIONS 1-9: 1

## 2020-03-27 ENCOUNTER — APPOINTMENT (OUTPATIENT)
Dept: GENERAL RADIOLOGY | Age: 85
End: 2020-03-27
Payer: MEDICARE

## 2020-03-27 ENCOUNTER — HOSPITAL ENCOUNTER (EMERGENCY)
Age: 85
Discharge: HOME | End: 2020-03-27
Payer: MEDICARE

## 2020-03-27 ENCOUNTER — APPOINTMENT (OUTPATIENT)
Dept: CT IMAGING | Age: 85
End: 2020-03-27
Payer: MEDICARE

## 2020-03-27 VITALS
OXYGEN SATURATION: 98 % | SYSTOLIC BLOOD PRESSURE: 126 MMHG | TEMPERATURE: 99 F | HEART RATE: 84 BPM | RESPIRATION RATE: 16 BRPM | DIASTOLIC BLOOD PRESSURE: 74 MMHG

## 2020-03-27 LAB
A/G RATIO: 0.9 (ref 1.1–2.2)
ALBUMIN SERPL-MCNC: 3.4 G/DL (ref 3.4–5)
ALP BLD-CCNC: 53 U/L (ref 40–129)
ALT SERPL-CCNC: 10 U/L (ref 10–40)
ANION GAP SERPL CALCULATED.3IONS-SCNC: 15 MMOL/L (ref 3–16)
AST SERPL-CCNC: 21 U/L (ref 15–37)
BASOPHILS ABSOLUTE: 0.1 K/UL (ref 0–0.2)
BASOPHILS RELATIVE PERCENT: 0.8 %
BILIRUB SERPL-MCNC: 0.5 MG/DL (ref 0–1)
BILIRUBIN URINE: NEGATIVE
BLOOD, URINE: NEGATIVE
BUN BLDV-MCNC: 18 MG/DL (ref 7–20)
CALCIUM SERPL-MCNC: 9.2 MG/DL (ref 8.3–10.6)
CHLORIDE BLD-SCNC: 99 MMOL/L (ref 99–110)
CLARITY: ABNORMAL
CO2: 21 MMOL/L (ref 21–32)
COLOR: YELLOW
CREAT SERPL-MCNC: 1.3 MG/DL (ref 0.8–1.3)
EKG ATRIAL RATE: 79 BPM
EKG DIAGNOSIS: NORMAL
EKG P-R INTERVAL: 206 MS
EKG Q-T INTERVAL: 400 MS
EKG QRS DURATION: 66 MS
EKG QTC CALCULATION (BAZETT): 458 MS
EKG R AXIS: -33 DEGREES
EKG T AXIS: 41 DEGREES
EKG VENTRICULAR RATE: 79 BPM
EOSINOPHILS ABSOLUTE: 0.1 K/UL (ref 0–0.6)
EOSINOPHILS RELATIVE PERCENT: 0.8 %
EPITHELIAL CELLS, UA: 1 /HPF (ref 0–5)
GFR AFRICAN AMERICAN: >60
GFR NON-AFRICAN AMERICAN: 51
GLOBULIN: 4 G/DL
GLUCOSE BLD-MCNC: 124 MG/DL (ref 70–99)
GLUCOSE URINE: NEGATIVE MG/DL
HCT VFR BLD CALC: 33.7 % (ref 40.5–52.5)
HEMOGLOBIN: 10.8 G/DL (ref 13.5–17.5)
HYALINE CASTS: 1 /LPF (ref 0–8)
KETONES, URINE: NEGATIVE MG/DL
LEUKOCYTE ESTERASE, URINE: NEGATIVE
LYMPHOCYTES ABSOLUTE: 2.2 K/UL (ref 1–5.1)
LYMPHOCYTES RELATIVE PERCENT: 25.9 %
MCH RBC QN AUTO: 31.1 PG (ref 26–34)
MCHC RBC AUTO-ENTMCNC: 32 G/DL (ref 31–36)
MCV RBC AUTO: 97.2 FL (ref 80–100)
MICROSCOPIC EXAMINATION: YES
MONOCYTES ABSOLUTE: 1.1 K/UL (ref 0–1.3)
MONOCYTES RELATIVE PERCENT: 12.9 %
NEUTROPHILS ABSOLUTE: 5 K/UL (ref 1.7–7.7)
NEUTROPHILS RELATIVE PERCENT: 59.6 %
NITRITE, URINE: NEGATIVE
PDW BLD-RTO: 14.2 % (ref 12.4–15.4)
PH UA: 8 (ref 5–8)
PLATELET # BLD: 221 K/UL (ref 135–450)
PMV BLD AUTO: 8.6 FL (ref 5–10.5)
POTASSIUM REFLEX MAGNESIUM: 4.4 MMOL/L (ref 3.5–5.1)
PROTEIN UA: ABNORMAL MG/DL
RBC # BLD: 3.47 M/UL (ref 4.2–5.9)
RBC UA: 5 /HPF (ref 0–4)
SODIUM BLD-SCNC: 135 MMOL/L (ref 136–145)
SPECIFIC GRAVITY UA: 1.02 (ref 1–1.03)
TOTAL PROTEIN: 7.4 G/DL (ref 6.4–8.2)
TROPONIN: <0.01 NG/ML
URINE REFLEX TO CULTURE: ABNORMAL
URINE TYPE: ABNORMAL
UROBILINOGEN, URINE: 0.2 E.U./DL
WBC # BLD: 8.4 K/UL (ref 4–11)
WBC UA: 1 /HPF (ref 0–5)

## 2020-03-27 PROCEDURE — 84484 ASSAY OF TROPONIN QUANT: CPT

## 2020-03-27 PROCEDURE — 70450 CT HEAD/BRAIN W/O DYE: CPT

## 2020-03-27 PROCEDURE — 97161 PT EVAL LOW COMPLEX 20 MIN: CPT

## 2020-03-27 PROCEDURE — 97165 OT EVAL LOW COMPLEX 30 MIN: CPT

## 2020-03-27 PROCEDURE — 97530 THERAPEUTIC ACTIVITIES: CPT

## 2020-03-27 PROCEDURE — 73620 X-RAY EXAM OF FOOT: CPT

## 2020-03-27 PROCEDURE — 80053 COMPREHEN METABOLIC PANEL: CPT

## 2020-03-27 PROCEDURE — 93010 ELECTROCARDIOGRAM REPORT: CPT | Performed by: INTERNAL MEDICINE

## 2020-03-27 PROCEDURE — 85025 COMPLETE CBC W/AUTO DIFF WBC: CPT

## 2020-03-27 PROCEDURE — 71045 X-RAY EXAM CHEST 1 VIEW: CPT

## 2020-03-27 PROCEDURE — 81001 URINALYSIS AUTO W/SCOPE: CPT

## 2020-03-27 PROCEDURE — 97116 GAIT TRAINING THERAPY: CPT

## 2020-03-27 PROCEDURE — 93005 ELECTROCARDIOGRAM TRACING: CPT | Performed by: PHYSICIAN ASSISTANT

## 2020-03-27 PROCEDURE — 6370000000 HC RX 637 (ALT 250 FOR IP): Performed by: PHYSICIAN ASSISTANT

## 2020-03-27 PROCEDURE — 36415 COLL VENOUS BLD VENIPUNCTURE: CPT

## 2020-03-27 PROCEDURE — 73630 X-RAY EXAM OF FOOT: CPT

## 2020-03-27 PROCEDURE — 73610 X-RAY EXAM OF ANKLE: CPT

## 2020-03-27 PROCEDURE — 99285 EMERGENCY DEPT VISIT HI MDM: CPT

## 2020-03-27 RX ORDER — ACETAMINOPHEN 325 MG/1
650 TABLET ORAL ONCE
Status: COMPLETED | OUTPATIENT
Start: 2020-03-27 | End: 2020-03-27

## 2020-03-27 RX ADMIN — ACETAMINOPHEN 650 MG: 325 TABLET ORAL at 11:19

## 2020-03-27 ASSESSMENT — ENCOUNTER SYMPTOMS
NAUSEA: 0
CHEST TIGHTNESS: 0
SHORTNESS OF BREATH: 0
VOMITING: 0
BACK PAIN: 0
ABDOMINAL PAIN: 0

## 2020-03-27 ASSESSMENT — PAIN DESCRIPTION - DESCRIPTORS
DESCRIPTORS: ACHING
DESCRIPTORS: ACHING

## 2020-03-27 ASSESSMENT — PAIN SCALES - GENERAL
PAINLEVEL_OUTOF10: 3
PAINLEVEL_OUTOF10: 5
PAINLEVEL_OUTOF10: 5

## 2020-03-27 ASSESSMENT — PAIN DESCRIPTION - ORIENTATION
ORIENTATION: LEFT
ORIENTATION: LEFT

## 2020-03-27 ASSESSMENT — PAIN DESCRIPTION - LOCATION
LOCATION: ANKLE
LOCATION: ANKLE

## 2020-03-27 ASSESSMENT — PAIN DESCRIPTION - PAIN TYPE: TYPE: ACUTE PAIN

## 2020-03-27 NOTE — ED PROVIDER NOTES
Appearance: Normal appearance. HENT:      Head: Normocephalic and atraumatic. Neck:      Musculoskeletal: Normal range of motion and neck supple. Comments: No midline spinous process tenderness  Abdominal:      Palpations: Abdomen is soft. Tenderness: There is no abdominal tenderness. Musculoskeletal:      Comments: no midline spinous process tenderness to thoracic or lumbar spine  Pelvis stable to rocking, tender to palpation to lateral medial aspect of left ankle. Mild edema noted. Range of motion limited secondary to pain. Pedal pulse was 2, sensation intact distally, no other focal tenderness to palpation throughout extremities   Skin:     General: Skin is warm. Neurological:      General: No focal deficit present. Mental Status: He is oriented to person, place, and time. Psychiatric:         Mood and Affect: Mood normal.         Behavior: Behavior normal.           DIAGNOSTIC RESULTS     EKG: All EKG's are interpreted by the Emergency Department Physician who either signs or Co-signs this chart in the absence of a cardiologist.    RADIOLOGY:   Non-plain film images such as CT, Ultrasound and MRI are read by the radiologist. Plain radiographic images are visualized and preliminarilyinterpreted by the emergency physician with the below findings:    Interpretation per the Radiologist below,if available at the time of this note:    [address]   Final Result   1. No acute intracranial abnormality. XR FOOT LEFT (MIN 3 VIEWS)   Final Result   No acute osseous injury of the left ankle or foot. XR ANKLE LEFT (MIN 3 VIEWS)   Final Result   No acute osseous injury of the left ankle or foot. XR CHEST PORTABLE   Final Result   No acute cardiopulmonary disease.                LABS:  Labs Reviewed   CBC WITH AUTO DIFFERENTIAL - Abnormal; Notable for the following components:       Result Value    RBC 3.47 (*)     Hemoglobin 10.8 (*)     Hematocrit 33.7 (*)     All other components within normal limits    Narrative:     Performed at:  [facility]  [address], [place] [facility] [facility] [employer]   Phone [phone]   COMPREHENSIVE METABOLIC PANEL W/ REFLEX TO MG FOR LOW K - Abnormal; Notable for the following components:    Sodium 135 (*)     Glucose 124 (*)     GFR Non-African American 51 (*)     Albumin/Globulin Ratio 0.9 (*)     All other components within normal limits    Narrative:     Performed at:  [place] [address], [place] [facility] [facility] [place] [address]   Phone [phone]   URINE RT REFLEX TO CULTURE - Abnormal; Notable for the following components:    Clarity, UA TURBID (*)     Protein, UA TRACE (*)     All other components within normal limits    Narrative:     Performed at:  [address] [facility] [place] [address]   Phone [phone]   MICROSCOPIC URINALYSIS - Abnormal; Notable for the following components:    RBC, UA 5 (*)     All other components within normal limits    Narrative:     Performed at:  [address] [facility] [place] [address]   Phone [phone]   TROPONIN    Narrative:     Performed at:  [address] [facility] [employer]   Phone [phone]       All other labs were within normal range or not returned as of this dictation. EMERGENCY DEPARTMENT COURSE and DIFFERENTIAL DIAGNOSIS/MDM:   Vitals:    Vitals:    03/27/20 1200 03/27/20 1202 03/27/20 1411 03/27/20 1432   BP:  (!) 143/57  126/74   Pulse:   78 84   Resp:    16   Temp:       TempSrc:       SpO2: 97%  97% 98%       MDM     Patient presents ED with HPI noted above. He is hemodynamically stable, afebrile and nontoxic-appearing. He is not hypoxic with oxygen saturation of 97% on room air. Given complaint of slight lightheadedness did obtain cardiac work-up.   EKG obtained interpreted by my attending, no STEMI. Troponin within normal limits. CBC showed hemoglobin 10.8, this is not significantly changed from previous. Kidney function improved from previous. Urine showed no acute infection. Patient states he feels well otherwise. No other significant electrolyte abnormalities to account for lightheadedness. Denies any lightheadedness currently. Patient was ambulated in the ED and did not experience any lightheadedness or dizziness. Patient states being lightheaded is not abnormal for him. States he feels well now and wants to go home. States he was told to sit on the edge of his bed before positional changes. Given negative cardiac work-up, he is experiencing symptoms frequently and chronically , he no syncopal episode and he wants to go home I am comfortable discharging him home. We discussed strict return precautions including persistence of lightheadedness, worsening, chest pain, shortness of breath, headaches, visual changes. He voiced understanding. His chief complaint was left ankle injury. X-rays obtained of left ankle and foot. X-ray showed no acute osseous abnormality. Patient neurovascular tach distally. We ambulated patient with a walker and Aircast and Ace wrap. Patient was having difficulty bearing weight on his left ankle. We did a bedside PT OT evaluation, please see their note. Patient was cleared for home PT/OT which will be arranged. Patient was discharged home with a 2 wheel walker based on PT recommendation. Patient has home PT/OT arranged. Patient understand strict return precautions. Patient requesting be discharged home. Patient to follow-up with PCP in 2 to 3 days for reevaluation. Return to ED if acute worsening. The patient tolerated their visit well. I have discussed the findings of today's workup with the patient and addressed the patient's questions and concerns.  Important warning signs as well as new or worsening symptoms

## 2020-03-27 NOTE — CARE COORDINATION
Discharge Plan:  Therapy evaluated patient to determine if he is safe to discharge home. Therapy recommends Home with Home Care. SW met with patient's daughter and provided Home Care list - daughter selected [address] Patient to discharge home with family support and [address] 2 wheeled walker provided.

## 2020-03-27 NOTE — PROGRESS NOTES
Occupational Therapy   Occupational Therapy Initial Assessment  Date: 3/27/2020   Patient Name: Alessandra Jurado  MRN: [id number]     : 1926    Date of Service: 3/27/2020    Discharge Recommendations:  Home with Home health OT, 24 hour supervision or assist  OT Equipment Recommendations  Other: rolling walker     Alessandra Jurado scored a 20/24 on the AM-PAC ADL Inpatient form. Current research shows that an AM-PAC score of 18 or greater is typically associated with a discharge to the patient's home setting. Based on the patients AM-PAC score and their current ADL deficits, it is recommended that the patient have 2-3 sessions per week of Occupational Therapy at d/c to increase the patients independence. If patient discharges prior to next session this note will serve as a discharge summary. Please see below for the latest assessment towards goals. Assessment   Assessment: Pt is a 79 y/o male presented to ED after a fall at home resulting in L ankle pain. X ray negative for fracture. PTA pt lives at home with spouse and was independent with ADLs and functional mobility. Today, pt reporting 8/10 ankle pain with weightbear but tolerated ambulating short distance with RW and SBA/CGA. Pt required cuing for hand placement during sit <> stands and cuing to stay inside walker when ambulating. Anticipate pt will require CGA for standing components of ADLs at this time. PT spoke with daughter on phone and daughter reports family can provide 24 hour supervision/assist at discharge. Pt is safe to return home with family support, home OT, and RW.    Prognosis: Good  Decision Making: Low Complexity  History: see above  Assistance / Modification: RW  OT Education: OT Role;Plan of Care  REQUIRES OT FOLLOW UP: No  Activity Tolerance  Activity Tolerance: Patient Tolerated treatment well;Patient limited by pain  Activity Tolerance: pt reporting 8/10 pain with weightbear  Safety Devices  Safety Devices in accessible  Home Equipment: 4 wheeled walker(wife uses)  ADL Assistance: Independent  Homemaking Assistance: Needs assistance  Ambulation Assistance: Independent  Transfer Assistance: Independent  Active : Yes  Additional Comments: Friend comes and does laundry, changes bed, cleaning. Wife cooks. Wife and patient do grocery shopping. Sleeps in flat bed. Reports 2 falls past 3 months       Objective   Vision: Impaired  Vision Exceptions: Wears glasses for reading  Hearing: Exceptions to [facility]  Hearing Exceptions: Bilateral hearing aid;Hard of hearing/hearing concerns    Orientation  Overall Orientation Status: Within Normal Limits  Observation/Palpation  Observation: left ankle ace wrap with splint  Balance  Sitting Balance: Stand by assistance  Standing Balance: Stand by assistance  Functional Mobility  Functional Mobility Comments: ambulated 30 feet with RW and SBA/CGA- cuing to keep close proximity inside walker  ADL  Additional Comments: anticipate pt will require CGA for standing components of ADLs at this time.          Bed mobility  Supine to Sit: Supervision  Sit to Supine: Supervision  Transfers  Sit to stand: Contact guard assistance  Stand to sit: Contact guard assistance  Transfer Comments: to/from RW- cuing for hand placement     Cognition  Overall Cognitive Status: WFL  Perception  Overall Perceptual Status: WFL     Sensation  Overall Sensation Status: WFL        LUE AROM (degrees)  LUE AROM : WFL  Left Hand AROM (degrees)  Left Hand AROM: WFL  RUE AROM (degrees)  RUE AROM : WFL  Right Hand AROM (degrees)  Right Hand AROM: WFL        Plan   Plan  Times per week: DC acute OT    AM-PAC Score  AM-PAC Inpatient Daily Activity Raw Score: 20 (03/27/20 1420)  AM-PAC Inpatient ADL T-Scale Score : 42.03 (03/27/20 1420)  ADL Inpatient CMS 0-100% Score: 38.32 (03/27/20 1420)  ADL Inpatient CMS G-Code Modifier : CJ (03/27/20 1420)    Goals  Short term goals  Time Frame for Short term goals: no acute OT goals identified as pt plans on going home from ED  Patient Goals   Patient goals : to go home       Therapy Time   Individual Concurrent Group Co-treatment   Time In 1320         Time Out 1400         Minutes 40         Timed Code Treatment Minutes: 40 Minutes     This note to serve as OT d/c summary if pt is d/c-ed prior to next therapy session.     Marly Campos, OTR/L

## 2020-03-27 NOTE — PROGRESS NOTES
Physical Therapy    Facility/Department: [address] [place] [address]  Initial Assessment / Discharge    NAME: Wander Corona  : 1926  MRN: [id number]    Date of Service: 3/27/2020    Discharge Recommendations:  24 hour supervision or assist, Home with assist ROOPA BRADFORD Level 1   Wander Corona scored a 21/24 on the AM-PAC short mobility form. Current research shows that an AM-PAC score of 18 or greater is typically associated with a discharge to the patient's home setting. Based on the patients AM-PAC score and their current functional mobility deficits, it is recommended that the patient have 2-3 sessions per week of Physical Therapy at d/c to increase the patients independence. PT Equipment Recommendations  Other: wh walker, issued this date by this writer. Assessment   Body structures, Functions, Activity limitations: Decreased functional mobility   Assessment: Prior to fall at home with L ankle pain (xray negative; splint on ankle), patient lived at home with wife and was independent in functional mobility including gait without device, ADLs, and driving.  6- status: Supervision Bed mobility, SBA/cue for transfers, and SBA-CGA/cues for wh walker ambulation x 50'. Patient gait quality improves with cues and as gait progresses. This therapist speaks with dtr Laine, who states family will be able to provide initial 24/7 support (for first few days) to insure safe mobility. Recommend home PT level 1 to maximize patient safety, tolerance, and independence with functional mobility. Patient requires a wh walker to promote safe and quality ambulation. Prognosis: Good  Decision Making: Low Complexity  History: as noted  Clinical Presentation: Stable  PT Education: Goals;PT Role;Transfer Training;Gait Training  Patient Education: Therapist verbally instructs dtr and patient in safe transfer and gait technique. Written instruction of this also provided.    REQUIRES PT FOLLOW UP: No  Activity Tolerance  Activity Tolerance: Patient Tolerated treatment well       Patient Diagnosis(es): The primary encounter diagnosis was Fall, initial encounter. A diagnosis of Left ankle injury, initial encounter was also pertinent to this visit. has a past medical history of Cancer ([address] Hemorrhoids, Hyperlipidemia, Hypertension, Influenza A, and Shortness of breath. has a past surgical history that includes Endoscopy, colon, diagnostic; Upper gastrointestinal endoscopy; hernia repair (Right, 1946); cyst removal; Carpal tunnel release (Right, 09/01/15); Carpal tunnel release (Left, 9/22/15); and Colonoscopy (N/A, 4/8/2019). Restrictions  Restrictions/Precautions  Restrictions/Precautions: Fall Risk  Position Activity Restriction  Other position/activity restrictions: L ankle splint- no weightbearing restrictions  Vision/Hearing  Vision: Impaired  Vision Exceptions: Wears glasses for reading  Hearing: Exceptions to [employer]  Hearing Exceptions: Bilateral hearing aid;Hard of hearing/hearing concerns     Subjective  General  Chart Reviewed: Yes  Patient assessed for rehabilitation services?: Yes  Additional Pertinent Hx: 79 yo male to hospital 3- after feeling lightheaded, tripped, and falling. Patient injured L ankle. Xrays head and L foot negative. Response To Previous Treatment: Not applicable  Family / Caregiver Present: No(not currently permitted)  Referring Practitioner: Yakov Liang PA-C  Referral Date : 03/27/20  Subjective  Subjective: Patient reports hope to go home. States, \"I think I'll be alright. \" Reports L outer ankle pain of 8/10 during gait. Agreeable to therapy.    Pain Screening  Patient Currently in Pain: Denies    Orientation  Orientation  Overall Orientation Status: Within Functional Limits     Social/Functional History  Social/Functional History  Lives With: Spouse  Type of Home: House  Home Layout: One level, Laundry in basement(work area in basement (goes

## 2020-03-30 ENCOUNTER — TELEPHONE (OUTPATIENT)
Dept: FAMILY MEDICINE CLINIC | Age: 85
End: 2020-03-30

## 2020-03-30 ENCOUNTER — VIRTUAL VISIT (OUTPATIENT)
Dept: FAMILY MEDICINE CLINIC | Age: 85
End: 2020-03-30
Payer: MEDICARE

## 2020-03-30 VITALS — BODY MASS INDEX: 22.15 KG/M2 | WEIGHT: 150 LBS

## 2020-03-30 PROCEDURE — 4040F PNEUMOC VAC/ADMIN/RCVD: CPT | Performed by: FAMILY MEDICINE

## 2020-03-30 PROCEDURE — 1123F ACP DISCUSS/DSCN MKR DOCD: CPT | Performed by: FAMILY MEDICINE

## 2020-03-30 PROCEDURE — 3288F FALL RISK ASSESSMENT DOCD: CPT | Performed by: FAMILY MEDICINE

## 2020-03-30 PROCEDURE — G8427 DOCREV CUR MEDS BY ELIG CLIN: HCPCS | Performed by: FAMILY MEDICINE

## 2020-03-30 PROCEDURE — 99213 OFFICE O/P EST LOW 20 MIN: CPT | Performed by: FAMILY MEDICINE

## 2020-03-30 ASSESSMENT — ENCOUNTER SYMPTOMS
COUGH: 0
SHORTNESS OF BREATH: 0
WHEEZING: 0
CHEST TIGHTNESS: 1

## 2020-03-30 ASSESSMENT — PATIENT HEALTH QUESTIONNAIRE - PHQ9
SUM OF ALL RESPONSES TO PHQ QUESTIONS 1-9: 0
2. FEELING DOWN, DEPRESSED OR HOPELESS: 0
1. LITTLE INTEREST OR PLEASURE IN DOING THINGS: 0
SUM OF ALL RESPONSES TO PHQ9 QUESTIONS 1 & 2: 0
SUM OF ALL RESPONSES TO PHQ QUESTIONS 1-9: 0

## 2020-03-30 NOTE — TELEPHONE ENCOUNTER
Forest Shall called from [facility]. Sd patient will be receiving a few nursing visits but will be receiving pt and ot. They will be assessing him and determining his plan of care w/ithin the next 48 hours. Rosanna needs PCP to sign off or give verbal orders for home care.  Please contact her back [phone]

## 2020-03-30 NOTE — PROGRESS NOTES
facility-administered medications for this visit. Past Medical History:   Diagnosis Date    Cancer [facility])     Prostate    Hemorrhoids     Hyperlipidemia     Hypertension     Influenza A 2014    Shortness of breath      Past Surgical History:   Procedure Laterality Date    CARPAL TUNNEL RELEASE Right 09/01/15    Right carpal tunnel release      CARPAL TUNNEL RELEASE Left 9/22/15    COLONOSCOPY N/A 2019    COLONOSCOPY DIAGNOSTIC performed by Gemma Ferreira MD at [address]      right wrist    ENDOSCOPY, COLON, DIAGNOSTIC      HERNIA REPAIR Right 194    inguinal    UPPER GASTROINTESTINAL ENDOSCOPY      with dilatation     Family History   Problem Relation Age of Onset    Hypertension Other     Stroke Other      Social History     Tobacco Use    Smoking status: Former Smoker     Last attempt to quit: 1955     Years since quittin.5    Smokeless tobacco: Never Used   Substance Use Topics    Alcohol use: No     Social History     Social History Narrative    Not on file     Social History     Substance and Sexual Activity   Sexual Activity Not Currently          Patient's past medical history, surgical history, family history, medications,  andallergies  were all reviewed and updated as appropriate today. Review of Systems   Constitutional: Negative for activity change, appetite change, chills, diaphoresis, fatigue and fever. Respiratory: Positive for chest tightness. Negative for cough, shortness of breath and wheezing. Cardiovascular: Negative for chest pain, palpitations and leg swelling. Neurological: Positive for dizziness, syncope and light-headedness. Negative for tremors, seizures, facial asymmetry, speech difficulty, weakness, numbness and headaches. Hematological: Negative for adenopathy. Does not bruise/bleed easily. Vitals:    20 1440   Weight: 150 lb (68 kg)       Physical Exam  Vitals signs reviewed.

## 2020-03-31 ENCOUNTER — TELEPHONE (OUTPATIENT)
Dept: CARDIOLOGY CLINIC | Age: 85
End: 2020-03-31

## 2020-04-01 NOTE — TELEPHONE ENCOUNTER
If the patient had syncope during the 30-day event monitor, then please direct me to the tracings (Epic does not have it). If the patient did not have syncope during the 30-day event monitor, then please issue a 14-day CAM patch. If he were to still not have any syncope during the 14-day CAM patch, he will need to be set up for an [address] [place] [address] Ave implantation whenever we are able to do so.

## 2020-04-02 PROCEDURE — 0296T PR EXT ECG > 48HR TO 21 DAY RCRD W/CONECT INTL RCRD: CPT | Performed by: INTERNAL MEDICINE

## 2020-04-02 NOTE — TELEPHONE ENCOUNTER
Patient came this morning to get CAM patch placed. Due to he and his wife's age and previous conversation that they \"didn't feel too good,\"  I asked that they remain in the car. Myself and Sonny Schmidt, Romario Freyulevard went to the car with mask/gloves to place CAM patch on patient. Discussed him wearing CAM patch for full 14 days to evaluate for any ectopy/arrhythmia. Explained how to return patch and timeframe results will be available. Also gave literature on ILR in the event this needs to be placed by Dr. Gui Casillas. Patient, wife and daughter verbalized understanding to all.

## 2020-05-01 ENCOUNTER — TELEPHONE (OUTPATIENT)
Dept: FAMILY MEDICINE CLINIC | Age: 85
End: 2020-05-01

## 2020-05-01 NOTE — TELEPHONE ENCOUNTER
[facility] called on 5/1/20   Regarding pt. When taking the pravastatin (PRAVACHOL) 40 MG tablet is experiencing muscle pain . When pt. Stop taking the muscle pain stop    Should pt.  Continual taking medication   Please advise

## 2020-05-08 ENCOUNTER — TELEPHONE (OUTPATIENT)
Dept: CARDIOLOGY CLINIC | Age: 85
End: 2020-05-08

## 2020-05-08 NOTE — TELEPHONE ENCOUNTER
Spoke with Saima Quinonez and Kylie Campa, he said that he does mostly have the episodes of dizziness when he changes positions. Advised him to change positions slowly. I let him know that the EP MD is not here today to review his monitor and I will call him back on Monday with any further recommendations.

## 2020-05-08 NOTE — TELEPHONE ENCOUNTER
Ivan's wife Chelsy  is calling stating Krystle Zuñiga wore a 14 day event monitor and mailed it back on April 23 and would like to know what the results are. Is also stating that Krystle Zuñiga is still getting very dizzy and what should he be doing?     Chelsy Fernández can be reached at [phone]

## 2020-05-14 PROCEDURE — 0298T PR EXT ECG > 48HR TO 21 DAY REVIEW AND INTERPRETATN: CPT | Performed by: INTERNAL MEDICINE

## 2020-05-14 NOTE — TELEPHONE ENCOUNTER
Ketan Brady wife Mariela Shoemaker was calling in this morning wanting to know if the results of Ivan's event monitor are back yet?     You can reach Mariela Shoemaker at #[phone]

## 2020-05-26 ENCOUNTER — TELEPHONE (OUTPATIENT)
Dept: FAMILY MEDICINE CLINIC | Age: 85
End: 2020-05-26

## 2020-05-28 ENCOUNTER — TELEPHONE (OUTPATIENT)
Dept: FAMILY MEDICINE CLINIC | Age: 85
End: 2020-05-28

## 2020-05-28 NOTE — TELEPHONE ENCOUNTER
[facility] received a call from:    Name of Caller: Kailee Mcbride      Relationship to patient:Self     Organization name: N/A    Best contact number: [phone]    Reason for call: Pt is requesting in office appointment.  Please adv

## 2020-05-28 NOTE — TELEPHONE ENCOUNTER
Called patient and scheduled for 06/08/2020 offered another green site for earlier appointment patient refused. Patient would like to have blood work done morning of appointment advised patient to be fasting. Can you order labs please?

## 2020-06-08 ENCOUNTER — OFFICE VISIT (OUTPATIENT)
Dept: FAMILY MEDICINE CLINIC | Age: 85
End: 2020-06-08
Payer: COMMERCIAL

## 2020-06-08 VITALS
SYSTOLIC BLOOD PRESSURE: 128 MMHG | OXYGEN SATURATION: 97 % | WEIGHT: 145 LBS | HEART RATE: 75 BPM | DIASTOLIC BLOOD PRESSURE: 62 MMHG | BODY MASS INDEX: 21.48 KG/M2 | HEIGHT: 69 IN

## 2020-06-08 DIAGNOSIS — Z13.220 LIPID SCREENING: ICD-10-CM

## 2020-06-08 DIAGNOSIS — D64.9 ANEMIA, UNSPECIFIED TYPE: ICD-10-CM

## 2020-06-08 DIAGNOSIS — N18.2 CKD (CHRONIC KIDNEY DISEASE) STAGE 2, GFR 60-89 ML/MIN: ICD-10-CM

## 2020-06-08 PROBLEM — K92.2 GI BLEED: Status: RESOLVED | Noted: 2019-04-06 | Resolved: 2020-06-08

## 2020-06-08 LAB
A/G RATIO: 1.3 (ref 1.1–2.2)
ALBUMIN SERPL-MCNC: 4 G/DL (ref 3.4–5)
ALP BLD-CCNC: 68 U/L (ref 40–129)
ALT SERPL-CCNC: 10 U/L (ref 10–40)
ANION GAP SERPL CALCULATED.3IONS-SCNC: 13 MMOL/L (ref 3–16)
AST SERPL-CCNC: 18 U/L (ref 15–37)
BASOPHILS ABSOLUTE: 0 K/UL (ref 0–0.2)
BASOPHILS RELATIVE PERCENT: 0.6 %
BILIRUB SERPL-MCNC: <0.2 MG/DL (ref 0–1)
BUN BLDV-MCNC: 19 MG/DL (ref 7–20)
CALCIUM SERPL-MCNC: 9.5 MG/DL (ref 8.3–10.6)
CHLORIDE BLD-SCNC: 100 MMOL/L (ref 99–110)
CHOLESTEROL, TOTAL: 233 MG/DL (ref 0–199)
CO2: 26 MMOL/L (ref 21–32)
CREAT SERPL-MCNC: 1.3 MG/DL (ref 0.8–1.3)
EOSINOPHILS ABSOLUTE: 0.1 K/UL (ref 0–0.6)
EOSINOPHILS RELATIVE PERCENT: 2.5 %
GFR AFRICAN AMERICAN: >60
GFR NON-AFRICAN AMERICAN: 51
GLOBULIN: 3.1 G/DL
GLUCOSE BLD-MCNC: 106 MG/DL (ref 70–99)
HCT VFR BLD CALC: 31.2 % (ref 40.5–52.5)
HDLC SERPL-MCNC: 77 MG/DL (ref 40–60)
HEMOGLOBIN: 10.4 G/DL (ref 13.5–17.5)
LDL CHOLESTEROL CALCULATED: 140 MG/DL
LYMPHOCYTES ABSOLUTE: 2 K/UL (ref 1–5.1)
LYMPHOCYTES RELATIVE PERCENT: 34.9 %
MCH RBC QN AUTO: 30.9 PG (ref 26–34)
MCHC RBC AUTO-ENTMCNC: 33.3 G/DL (ref 31–36)
MCV RBC AUTO: 93.1 FL (ref 80–100)
MONOCYTES ABSOLUTE: 0.7 K/UL (ref 0–1.3)
MONOCYTES RELATIVE PERCENT: 12.6 %
NEUTROPHILS ABSOLUTE: 2.9 K/UL (ref 1.7–7.7)
NEUTROPHILS RELATIVE PERCENT: 49.4 %
PDW BLD-RTO: 15.4 % (ref 12.4–15.4)
PLATELET # BLD: 274 K/UL (ref 135–450)
PMV BLD AUTO: 8.6 FL (ref 5–10.5)
POTASSIUM SERPL-SCNC: 4.6 MMOL/L (ref 3.5–5.1)
RBC # BLD: 3.36 M/UL (ref 4.2–5.9)
SODIUM BLD-SCNC: 139 MMOL/L (ref 136–145)
TOTAL PROTEIN: 7.1 G/DL (ref 6.4–8.2)
TRIGL SERPL-MCNC: 80 MG/DL (ref 0–150)
VLDLC SERPL CALC-MCNC: 16 MG/DL
WBC # BLD: 5.8 K/UL (ref 4–11)

## 2020-06-08 PROCEDURE — 1036F TOBACCO NON-USER: CPT | Performed by: FAMILY MEDICINE

## 2020-06-08 PROCEDURE — 99213 OFFICE O/P EST LOW 20 MIN: CPT | Performed by: FAMILY MEDICINE

## 2020-06-08 PROCEDURE — G8427 DOCREV CUR MEDS BY ELIG CLIN: HCPCS | Performed by: FAMILY MEDICINE

## 2020-06-08 PROCEDURE — 4040F PNEUMOC VAC/ADMIN/RCVD: CPT | Performed by: FAMILY MEDICINE

## 2020-06-08 PROCEDURE — G8420 CALC BMI NORM PARAMETERS: HCPCS | Performed by: FAMILY MEDICINE

## 2020-06-08 PROCEDURE — 1123F ACP DISCUSS/DSCN MKR DOCD: CPT | Performed by: FAMILY MEDICINE

## 2020-06-08 ASSESSMENT — ENCOUNTER SYMPTOMS
SORE THROAT: 0
CHEST TIGHTNESS: 0
RHINORRHEA: 0
BACK PAIN: 0
DIARRHEA: 0
SHORTNESS OF BREATH: 0
ABDOMINAL PAIN: 0
COUGH: 0
WHEEZING: 0
BLOOD IN STOOL: 0
NAUSEA: 0
PHOTOPHOBIA: 0
CONSTIPATION: 0
VOMITING: 0
TROUBLE SWALLOWING: 0

## 2020-06-08 NOTE — PROGRESS NOTES
TAKE ONE TABLET BY MOUTH DAILY (Patient not taking: Reported on 2020) 90 tablet 3    magnesium hydroxide (MILK OF MAGNESIA) 400 MG/5ML suspension Take 30 mLs by mouth daily as needed for Constipation (Patient not taking: Reported on 2020) 1 Bottle 0     No current facility-administered medications for this visit. Past Medical History:   Diagnosis Date    Cancer [facility])     Prostate    Hemorrhoids     Hyperlipidemia     Hypertension     Influenza A 2014    Shortness of breath      Past Surgical History:   Procedure Laterality Date    CARPAL TUNNEL RELEASE Right 09/01/15    Right carpal tunnel release      CARPAL TUNNEL RELEASE Left 9/22/15    COLONOSCOPY N/A 2019    COLONOSCOPY DIAGNOSTIC performed by Sandra Brown MD at [address]      right wrist    ENDOSCOPY, COLON, DIAGNOSTIC      HERNIA REPAIR Right     inguinal    UPPER GASTROINTESTINAL ENDOSCOPY      with dilatation     Family History   Problem Relation Age of Onset    Hypertension Other     Stroke Other      Social History     Tobacco Use    Smoking status: Former Smoker     Last attempt to quit: 1955     Years since quittin.7    Smokeless tobacco: Never Used   Substance Use Topics    Alcohol use: No     Social History     Social History Narrative    Not on file     Social History     Substance and Sexual Activity   Sexual Activity Not Currently          Patient's past medical history, surgical history, family history, medications,  andallergies  were all reviewed and updated as appropriate today. Review of Systems   Constitutional: Negative for activity change, appetite change, chills, diaphoresis, fatigue and fever. HENT: Negative for hearing loss, rhinorrhea, sore throat and trouble swallowing. Eyes: Negative for photophobia and visual disturbance. Respiratory: Negative for cough, chest tightness, shortness of breath and wheezing.     Cardiovascular: Positive for palpitations (rarely). Negative for chest pain and leg swelling. Gastrointestinal: Negative for abdominal pain, blood in stool, constipation, diarrhea, nausea and vomiting. Genitourinary: Negative for difficulty urinating, dysuria and frequency. Musculoskeletal: Negative for arthralgias, back pain, gait problem, neck pain and neck stiffness. Skin: Negative for rash and wound. Neurological: Positive for dizziness and light-headedness. Negative for syncope, weakness, numbness and headaches. Psychiatric/Behavioral: Negative for dysphoric mood and sleep disturbance. The patient is not nervous/anxious. Vitals:    06/08/20 0911   BP: 128/62   Site: Right Upper Arm   Position: Sitting   Cuff Size: Medium Adult   Pulse: 75   SpO2: 97%   Weight: 145 lb (65.8 kg)   Height: 5' 9\" (1.753 m)       Physical Exam  Vitals signs reviewed. Constitutional:       General: He is not in acute distress. Appearance: Normal appearance. He is well-developed. HENT:      Head: Normocephalic and atraumatic. Right Ear: Hearing, tympanic membrane, ear canal and external ear normal.      Left Ear: Hearing, tympanic membrane, ear canal and external ear normal.      Mouth/Throat:      Mouth: Mucous membranes are moist.      Pharynx: No oropharyngeal exudate or posterior oropharyngeal erythema. Eyes:      Extraocular Movements: Extraocular movements intact. Conjunctiva/sclera: Conjunctivae normal.      Pupils: Pupils are equal, round, and reactive to light. Neck:      Musculoskeletal: Normal range of motion and neck supple. Thyroid: No thyromegaly. Cardiovascular:      Rate and Rhythm: Normal rate and regular rhythm. Pulses: Normal pulses. Heart sounds: Normal heart sounds. No murmur. No friction rub. No gallop. Pulmonary:      Effort: Pulmonary effort is normal.      Breath sounds: Normal breath sounds. No wheezing.    Abdominal:      General: Bowel sounds are normal. There is no

## 2020-07-09 ENCOUNTER — TELEPHONE (OUTPATIENT)
Dept: CARDIOLOGY CLINIC | Age: 85
End: 2020-07-09

## 2020-07-09 NOTE — TELEPHONE ENCOUNTER
Sandrine Worthy states he went to see Dr Theo Jung and told him he should see a cardiologist for leg pain. He fell and his leg started bothering him.  Please advise if patient needs sooner than August.

## 2020-07-09 NOTE — TELEPHONE ENCOUNTER
Pt wife Brea(ok per Hippa) called in stating that the pt saw his orthopedist recently and they told him he should get in to see his cardiologist sooner than later. The wife could not tell me the reason for pt needing to be seen stating she does not remember. I told her we could move the pt appt from 10/1 to 8/27 but she did not want that and the pt does not want to see Kumar De Los Santos. Please advise.     You can reach Kami Jackson at [phone]

## 2020-07-27 ENCOUNTER — HOSPITAL ENCOUNTER (OUTPATIENT)
Age: 85
Discharge: HOME | End: 2020-07-27
Payer: MEDICARE

## 2020-07-27 LAB
A/G RATIO: 1.1 (ref 1.1–2.2)
ALBUMIN SERPL-MCNC: 3.8 G/DL (ref 3.4–5)
ALP BLD-CCNC: 70 U/L (ref 40–129)
ALT SERPL-CCNC: 10 U/L (ref 10–40)
ANION GAP SERPL CALCULATED.3IONS-SCNC: 14 MMOL/L (ref 3–16)
AST SERPL-CCNC: 20 U/L (ref 15–37)
BILIRUB SERPL-MCNC: <0.2 MG/DL (ref 0–1)
BUN BLDV-MCNC: 19 MG/DL (ref 7–20)
CALCIUM SERPL-MCNC: 9.4 MG/DL (ref 8.3–10.6)
CHLORIDE BLD-SCNC: 100 MMOL/L (ref 99–110)
CHOLESTEROL, TOTAL: 210 MG/DL (ref 0–199)
CO2: 25 MMOL/L (ref 21–32)
CREAT SERPL-MCNC: 1.2 MG/DL (ref 0.8–1.3)
GFR AFRICAN AMERICAN: >60
GFR NON-AFRICAN AMERICAN: 56
GLOBULIN: 3.6 G/DL
GLUCOSE BLD-MCNC: 98 MG/DL (ref 70–99)
HCT VFR BLD CALC: 29.8 % (ref 40.5–52.5)
HDLC SERPL-MCNC: 81 MG/DL (ref 40–60)
HEMOGLOBIN: 10.1 G/DL (ref 13.5–17.5)
LDL CHOLESTEROL CALCULATED: 115 MG/DL
MCH RBC QN AUTO: 31.4 PG (ref 26–34)
MCHC RBC AUTO-ENTMCNC: 33.8 G/DL (ref 31–36)
MCV RBC AUTO: 92.8 FL (ref 80–100)
PDW BLD-RTO: 15.6 % (ref 12.4–15.4)
PLATELET # BLD: 192 K/UL (ref 135–450)
PMV BLD AUTO: 8.1 FL (ref 5–10.5)
POTASSIUM SERPL-SCNC: 4.3 MMOL/L (ref 3.5–5.1)
RBC # BLD: 3.21 M/UL (ref 4.2–5.9)
SODIUM BLD-SCNC: 139 MMOL/L (ref 136–145)
TOTAL PROTEIN: 7.4 G/DL (ref 6.4–8.2)
TRIGL SERPL-MCNC: 69 MG/DL (ref 0–150)
TSH SERPL DL<=0.05 MIU/L-ACNC: 1.73 UIU/ML (ref 0.27–4.2)
VLDLC SERPL CALC-MCNC: 14 MG/DL
WBC # BLD: 6.1 K/UL (ref 4–11)

## 2020-07-27 PROCEDURE — 36415 COLL VENOUS BLD VENIPUNCTURE: CPT

## 2020-07-27 PROCEDURE — 85027 COMPLETE CBC AUTOMATED: CPT

## 2020-07-27 PROCEDURE — 80053 COMPREHEN METABOLIC PANEL: CPT

## 2020-07-27 PROCEDURE — 80061 LIPID PANEL: CPT

## 2020-07-27 PROCEDURE — 84443 ASSAY THYROID STIM HORMONE: CPT

## 2020-08-26 NOTE — PROGRESS NOTES
CARPAL TUNNEL RELEASE Left 9/22/15    COLONOSCOPY N/A 2019    COLONOSCOPY DIAGNOSTIC performed by Suzy Peña MD at [facility][note 3]      right wrist    ENDOSCOPY, COLON, DIAGNOSTIC      HERNIA REPAIR Right     inguinal    UPPER GASTROINTESTINAL ENDOSCOPY      with dilatation      Family History   Problem Relation Age of Onset    Hypertension Other     Stroke Other       Social History     Tobacco Use    Smoking status: Former Smoker     Last attempt to quit: 1955     Years since quittin.9    Smokeless tobacco: Never Used   Substance Use Topics    Alcohol use: No    Drug use: No     No Known Allergies      Review of Systems -   Constitutional: Negative for weight gain/loss; malaise, fever  Respiratory: Negative for Asthma;  cough and hemoptysis  Cardiovascular: Negative for palpitations,dizziness   Gastrointestinal: Negative for abd.pain; constipation/diarrhea;    Genitourinary: Negative for stones; hematuria; frequency hesitancy  Integumentt: Negative for rash or pruritis  Hematologic/lymphatic: Negative for blood dyscrasia; leukemia/lymphoma  Musculoskeletal: Negative for Connective tissue disease  Neurological:  Negative for Seizure   Behavioral/Psych:Negative for Bipolar disorder, Schizophrenia; Dementia  Endocrine: negative for thyroid, parathyroid disease    Physical Examination:    /68 (Site: Right Upper Arm, Position: Sitting)   Pulse 66   Temp 99.3 °F (37.4 °C)   Ht 5' 9\" (1.753 m)   Wt 149 lb (67.6 kg)   SpO2 100%   BMI 22.00 kg/m²    HEENT:  Face: Atraumatic, Conjunctiva: Pink; non icteric,  Mucous Memb:  Moist, No thyromegaly or Lymphadenopathy  Respiratory:  Resp Assessment: WNL, Resp Auscultation: Normal  Cardiovascular: Auscultation: nl S1 & S2, Palpation:  Nl PMI;  No heaves or thrills, JVP:  normal  Abdomen: Soft, non-tender, Normal bowel sounds,  No organomegaly  Extremities: No Cyanosis or Clubbing  Neurological: Oriented to time, place, and person, Non-anxious  Psychiatric: Normal mood and affect  Skin: Warm and dry,  No rash seen  /68 (Site: Right Upper Arm, Position: Sitting)   Pulse 66   Temp 99.3 °F (37.4 °C)   Ht 5' 9\" (1.753 m)   Wt 149 lb (67.6 kg)   SpO2 100%   BMI 22.00 kg/m²        Current Outpatient Medications   Medication Sig Dispense Refill    apixaban (ELIQUIS) 2.5 MG TABS tablet TAKE ONE TABLET BY MOUTH TWICE A  tablet 3    Misc. Devices (WALKER) MISC 1 each by Does not apply route daily Dispense and Fit for injury to lower extremity and weakness. 1 each 0    Dietary Management Product (SENTRA AM PO) Take by mouth daily Indications: For men      leuprolide acetate, 6 Month, (ELIGARD) 45 MG injection Inject 45 mg into the skin every 6 months      albuterol sulfate  (90 Base) MCG/ACT inhaler Inhale 2 puffs into the lungs 4 times daily as needed for Wheezing 3 Inhaler 1    Multiple Minerals-Vitamins (PROSTEON PO) Take 2 tablets by mouth 2 times daily        No current facility-administered medications for this visit. Labs:   Lab Results   Component Value Date    HDL 81 07/27/2020    HDL 69 03/05/2012    LDLCALC 115 07/27/2020    TRIG 69 07/27/2020       EKG: 10/1/19: SR    CT of head: 1/18/19  FINDINGS:       No acute intracranial hemorrhage. No extra-axial fluid collections. Hyperdensity along the tentorium and falx cerebri is likely related to a calcifications. Moderate amount of periventricular and subcortical white matter hypoattenuation. The ventricles and extra-axial spaces are normal in size and configuration.       Prior bilateral cataract replacement. Senescent calcifications are present in the globes. Mild opacification the maxillary sinuses. No abnormality of the calvarium is identified.         1. No acute stroke, mass, or hemorrhage. 2. Moderate chronic small vessel ischemic white matter disease.        CT of head: 7/6/18  No acute intracranial abnormality.       Stable pattern of atrophy and microvascular ischemic disease.            ECHO:7/6/18  Left ventricular cavity size is normal. There is moderate asymmetric  hypertrophy of the basal septum. Overall left ventricular systolic function appears hyperdynamic. Ejection fraction is visually estimated to be >70%. No regional wall motion abnormalities are noted. There is a late peaking gradient recorded across the LV outflow tract with a peak gradient of  52mmHg. with Valsalva maneuver. Mitral annular calcification is present. Mitral valve leaflets appear mildly thickened. systolic ant motion of anterior mitral leaflet. Moderate mitral regurgitation. Aortic valve appears sclerotic but appears to open adequately. Mild tricuspid regurgitation. Stress Nuclear: 7/31/17  Summary    Normal myocardial perfusion study.    Normal LV size and systolic function.    Uncontrolled hypertension.    Overall findings represent a low risk study.         Peak HR:86 bpm                                  HR/BP product:44160    Peak BP:190/68 mmHg    Predicted HR: 130 bpm    % of predicted HR: 66    Test duration: 1 min and 40 sec    Reason for termination:Physiologic Maximum        ECG Findings    No ischemic EKG changes.    Nondiagnostic due to failure to reach target.        Arrhythmias    None     Holter Monitor: 7/25/16  DAY 1  1. Rhythm was sinus with episodes of PSVT. 2. There was occasional SVE. 3. There was rare VE, including one couplet. 4. Patient marker correlated with sinus rhythm. 5. No diary. SV Eola: 8.14 seconds (0.01%); 15 beats (0.01%)  DAY 2  1. Rhythm was sinus with episodes of PSVT and Afib. 2. There was rare SVE. 3. There was rare VE. 4. Patient marker was not used. 5. No diary. SV Eola: 7.40 seconds (0.01%); 14 beats (0.01%)  AF. Eola: 5.43 seconds (0.01%); 10 beats (0.01%)    Short runs of atrial fibrillation.  Rare PVCs    30 day event monitor 3/2019: Scanned to media     CAM patch: 4/2-4/17/20  NSR with 1st regurgitation  This may be a contributing factor to his episodic shortness of breath. Essential hypertension  Well controlled. Continue current management. Follow up yearly or sooner if needed       Thank you very much for allowing me to participate in the care of your patient. Please do not hesitate to contact me if you have any questions. Sincerely,    Kaitlin Martinez M.D  [facility], [address] [place] [address]  Ph: [phone]  Fax: [phone]    This note was scribed in the presence of Dr. Kaitlin Martinez MD by Claudetta Burkes  Physician Attestation:  The scribes documentation has been prepared under my direction and personally reviewed by me in its entirety. I confirm that the note above accurately reflects all work, treatment, procedures, and medical decision making performed by me.

## 2020-08-27 ENCOUNTER — OFFICE VISIT (OUTPATIENT)
Dept: CARDIOLOGY CLINIC | Age: 85
End: 2020-08-27
Payer: MEDICARE

## 2020-08-27 VITALS
SYSTOLIC BLOOD PRESSURE: 138 MMHG | DIASTOLIC BLOOD PRESSURE: 68 MMHG | HEART RATE: 66 BPM | WEIGHT: 149 LBS | BODY MASS INDEX: 22.07 KG/M2 | HEIGHT: 69 IN | TEMPERATURE: 99.3 F | OXYGEN SATURATION: 100 %

## 2020-08-27 PROCEDURE — 4040F PNEUMOC VAC/ADMIN/RCVD: CPT | Performed by: INTERNAL MEDICINE

## 2020-08-27 PROCEDURE — G8420 CALC BMI NORM PARAMETERS: HCPCS | Performed by: INTERNAL MEDICINE

## 2020-08-27 PROCEDURE — 1123F ACP DISCUSS/DSCN MKR DOCD: CPT | Performed by: INTERNAL MEDICINE

## 2020-08-27 PROCEDURE — 99214 OFFICE O/P EST MOD 30 MIN: CPT | Performed by: INTERNAL MEDICINE

## 2020-08-27 PROCEDURE — 93000 ELECTROCARDIOGRAM COMPLETE: CPT | Performed by: INTERNAL MEDICINE

## 2020-08-27 PROCEDURE — 1036F TOBACCO NON-USER: CPT | Performed by: INTERNAL MEDICINE

## 2020-08-27 PROCEDURE — G8427 DOCREV CUR MEDS BY ELIG CLIN: HCPCS | Performed by: INTERNAL MEDICINE

## 2020-08-27 NOTE — PATIENT INSTRUCTIONS
Patient Education        Lightheadedness or Faintness: Care Instructions  Your Care Instructions  Lightheadedness is a feeling that you are about to faint or \"pass out. \" You do not feel as if you or your surroundings are moving. It is different from vertigo, which is the feeling that you or things around you are spinning or tilting. Lightheadedness usually goes away or gets better when you lie down. If lightheadedness gets worse, it can lead to a fainting spell. It is common to feel lightheaded from time to time. Lightheadedness usually is not caused by a serious problem. It often is caused by a short-lasting drop in blood pressure and blood flow to your head that occurs when you get up too quickly from a seated or lying position. Follow-up care is a key part of your treatment and safety. Be sure to make and go to all appointments, and call your doctor if you are having problems. It's also a good idea to know your test results and keep a list of the medicines you take. How can you care for yourself at home? · Lie down for 1 or 2 minutes when you feel lightheaded. After lying down, sit up slowly and remain sitting for 1 to 2 minutes before slowly standing up. · Avoid movements, positions, or activities that have made you lightheaded in the past.  · Get plenty of rest, especially if you have a cold or flu, which can cause lightheadedness. · Make sure you drink plenty of fluids, especially if you have a fever or have been sweating. · Do not drive or put yourself and others in danger while you feel lightheaded. When should you call for help? [id number] anytime you think you may need emergency care. For example, call if:  · You have symptoms of a stroke. These may include:  ? Sudden numbness, tingling, weakness, or loss of movement in your face, arm, or leg, especially on only one side of your body. ? Sudden vision changes. ? Sudden trouble speaking.   ? Sudden confusion or trouble understanding simple statements. ? Sudden problems with walking or balance. ? A sudden, severe headache that is different from past headaches. · You have symptoms of a heart attack. These may include:  ? Chest pain or pressure, or a strange feeling in the chest.  ? Sweating. ? Shortness of breath. ? Nausea or vomiting. ? Pain, pressure, or a strange feeling in the back, neck, jaw, or upper belly or in one or both shoulders or arms. ? Lightheadedness or sudden weakness. ? A fast or irregular heartbeat. After you call 911, the  may tell you to chew 1 adult-strength or 2 to 4 low-dose aspirin. Wait for an ambulance. Do not try to drive yourself. Watch closely for changes in your health, and be sure to contact your doctor if:  · Your lightheadedness gets worse or does not get better with home care. Where can you learn more? Go to https://Caravan.[employer]. org and sign in to your [employer] account. Enter Q884 in the Art Circle box to learn more about \"Lightheadedness or Faintness: Care Instructions. \"     If you do not have an account, please click on the \"Sign Up Now\" link. Current as of: June 26, 2019               Content Version: 12.5  © 9742-0979 [employer]. Care instructions adapted under license by [facility] ([facility]). If you have questions about a medical condition or this instruction, always ask your healthcare professional. [place] [address] any warranty or liability for your use of this information.

## 2020-09-08 ENCOUNTER — HOSPITAL ENCOUNTER (INPATIENT)
Age: 85
LOS: 1 days | Discharge: HOME | DRG: 262 | End: 2020-09-09
Attending: STUDENT IN AN ORGANIZED HEALTH CARE EDUCATION/TRAINING PROGRAM | Admitting: STUDENT IN AN ORGANIZED HEALTH CARE EDUCATION/TRAINING PROGRAM
Payer: MEDICARE

## 2020-09-08 ENCOUNTER — APPOINTMENT (OUTPATIENT)
Dept: GENERAL RADIOLOGY | Age: 85
DRG: 262 | End: 2020-09-08
Payer: MEDICARE

## 2020-09-08 LAB
A/G RATIO: 1.2 (ref 1.1–2.2)
ALBUMIN SERPL-MCNC: 3.8 G/DL (ref 3.4–5)
ALP BLD-CCNC: 56 U/L (ref 40–129)
ALT SERPL-CCNC: 13 U/L (ref 10–40)
ANION GAP SERPL CALCULATED.3IONS-SCNC: 13 MMOL/L (ref 3–16)
AST SERPL-CCNC: 21 U/L (ref 15–37)
BASOPHILS ABSOLUTE: 0 K/UL (ref 0–0.2)
BASOPHILS RELATIVE PERCENT: 0.7 %
BILIRUB SERPL-MCNC: <0.2 MG/DL (ref 0–1)
BILIRUBIN URINE: NEGATIVE
BLOOD, URINE: NEGATIVE
BUN BLDV-MCNC: 20 MG/DL (ref 7–20)
CALCIUM SERPL-MCNC: 9.3 MG/DL (ref 8.3–10.6)
CHLORIDE BLD-SCNC: 99 MMOL/L (ref 99–110)
CLARITY: ABNORMAL
CO2: 24 MMOL/L (ref 21–32)
COLOR: YELLOW
CREAT SERPL-MCNC: 1.3 MG/DL (ref 0.8–1.3)
EOSINOPHILS ABSOLUTE: 0.3 K/UL (ref 0–0.6)
EOSINOPHILS RELATIVE PERCENT: 3.5 %
EPITHELIAL CELLS, UA: NORMAL /HPF (ref 0–5)
GFR AFRICAN AMERICAN: >60
GFR NON-AFRICAN AMERICAN: 51
GLOBULIN: 3.2 G/DL
GLUCOSE BLD-MCNC: 202 MG/DL (ref 70–99)
GLUCOSE URINE: NEGATIVE MG/DL
HCT VFR BLD CALC: 28.5 % (ref 40.5–52.5)
HEMOGLOBIN: 9.8 G/DL (ref 13.5–17.5)
KETONES, URINE: NEGATIVE MG/DL
LEUKOCYTE ESTERASE, URINE: NEGATIVE
LYMPHOCYTES ABSOLUTE: 2.4 K/UL (ref 1–5.1)
LYMPHOCYTES RELATIVE PERCENT: 31.2 %
MCH RBC QN AUTO: 31.6 PG (ref 26–34)
MCHC RBC AUTO-ENTMCNC: 34.3 G/DL (ref 31–36)
MCV RBC AUTO: 92.1 FL (ref 80–100)
MICROSCOPIC EXAMINATION: YES
MONOCYTES ABSOLUTE: 0.8 K/UL (ref 0–1.3)
MONOCYTES RELATIVE PERCENT: 10.2 %
NEUTROPHILS ABSOLUTE: 4.2 K/UL (ref 1.7–7.7)
NEUTROPHILS RELATIVE PERCENT: 54.4 %
NITRITE, URINE: NEGATIVE
PDW BLD-RTO: 15 % (ref 12.4–15.4)
PH UA: 6.5 (ref 5–8)
PLATELET # BLD: 200 K/UL (ref 135–450)
PMV BLD AUTO: 8.6 FL (ref 5–10.5)
POTASSIUM REFLEX MAGNESIUM: 4.3 MMOL/L (ref 3.5–5.1)
PRO-BNP: 1162 PG/ML (ref 0–449)
PROTEIN UA: NEGATIVE MG/DL
RBC # BLD: 3.09 M/UL (ref 4.2–5.9)
RBC UA: NORMAL /HPF (ref 0–4)
SODIUM BLD-SCNC: 136 MMOL/L (ref 136–145)
SPECIFIC GRAVITY UA: 1.01 (ref 1–1.03)
TOTAL PROTEIN: 7 G/DL (ref 6.4–8.2)
TROPONIN: <0.01 NG/ML
URINE TYPE: ABNORMAL
UROBILINOGEN, URINE: 0.2 E.U./DL
WBC # BLD: 7.6 K/UL (ref 4–11)
WBC UA: NORMAL /HPF (ref 0–5)

## 2020-09-08 PROCEDURE — 2580000003 HC RX 258: Performed by: NURSE PRACTITIONER

## 2020-09-08 PROCEDURE — 1200000000 HC SEMI PRIVATE

## 2020-09-08 PROCEDURE — 71045 X-RAY EXAM CHEST 1 VIEW: CPT

## 2020-09-08 PROCEDURE — 6370000000 HC RX 637 (ALT 250 FOR IP): Performed by: NURSE PRACTITIONER

## 2020-09-08 PROCEDURE — 81001 URINALYSIS AUTO W/SCOPE: CPT

## 2020-09-08 PROCEDURE — 36415 COLL VENOUS BLD VENIPUNCTURE: CPT

## 2020-09-08 PROCEDURE — 83880 ASSAY OF NATRIURETIC PEPTIDE: CPT

## 2020-09-08 PROCEDURE — 99285 EMERGENCY DEPT VISIT HI MDM: CPT

## 2020-09-08 PROCEDURE — 84484 ASSAY OF TROPONIN QUANT: CPT

## 2020-09-08 PROCEDURE — 93005 ELECTROCARDIOGRAM TRACING: CPT | Performed by: STUDENT IN AN ORGANIZED HEALTH CARE EDUCATION/TRAINING PROGRAM

## 2020-09-08 PROCEDURE — 85025 COMPLETE CBC W/AUTO DIFF WBC: CPT

## 2020-09-08 PROCEDURE — 80053 COMPREHEN METABOLIC PANEL: CPT

## 2020-09-08 RX ORDER — SODIUM CHLORIDE 0.9 % (FLUSH) 0.9 %
10 SYRINGE (ML) INJECTION PRN
Status: DISCONTINUED | OUTPATIENT
Start: 2020-09-08 | End: 2020-09-10 | Stop reason: HOSPADM

## 2020-09-08 RX ORDER — PROMETHAZINE HYDROCHLORIDE 25 MG/1
12.5 TABLET ORAL EVERY 6 HOURS PRN
Status: DISCONTINUED | OUTPATIENT
Start: 2020-09-08 | End: 2020-09-10 | Stop reason: HOSPADM

## 2020-09-08 RX ORDER — FAMOTIDINE 20 MG/1
20 TABLET, FILM COATED ORAL DAILY
Status: DISCONTINUED | OUTPATIENT
Start: 2020-09-08 | End: 2020-09-10 | Stop reason: HOSPADM

## 2020-09-08 RX ORDER — ACETAMINOPHEN 325 MG/1
650 TABLET ORAL EVERY 6 HOURS PRN
Status: DISCONTINUED | OUTPATIENT
Start: 2020-09-08 | End: 2020-09-10 | Stop reason: HOSPADM

## 2020-09-08 RX ORDER — SODIUM CHLORIDE 0.9 % (FLUSH) 0.9 %
10 SYRINGE (ML) INJECTION EVERY 12 HOURS SCHEDULED
Status: DISCONTINUED | OUTPATIENT
Start: 2020-09-08 | End: 2020-09-10 | Stop reason: HOSPADM

## 2020-09-08 RX ORDER — ACETAMINOPHEN 650 MG/1
650 SUPPOSITORY RECTAL EVERY 6 HOURS PRN
Status: DISCONTINUED | OUTPATIENT
Start: 2020-09-08 | End: 2020-09-10 | Stop reason: HOSPADM

## 2020-09-08 RX ORDER — POLYETHYLENE GLYCOL 3350 17 G/17G
17 POWDER, FOR SOLUTION ORAL DAILY PRN
Status: DISCONTINUED | OUTPATIENT
Start: 2020-09-08 | End: 2020-09-10 | Stop reason: HOSPADM

## 2020-09-08 RX ORDER — FAMOTIDINE 20 MG/1
20 TABLET, FILM COATED ORAL 2 TIMES DAILY
Status: DISCONTINUED | OUTPATIENT
Start: 2020-09-08 | End: 2020-09-08 | Stop reason: DRUGHIGH

## 2020-09-08 RX ORDER — ONDANSETRON 2 MG/ML
4 INJECTION INTRAMUSCULAR; INTRAVENOUS EVERY 6 HOURS PRN
Status: DISCONTINUED | OUTPATIENT
Start: 2020-09-08 | End: 2020-09-10 | Stop reason: HOSPADM

## 2020-09-08 RX ADMIN — SODIUM CHLORIDE, PRESERVATIVE FREE 10 ML: 5 INJECTION INTRAVENOUS at 22:46

## 2020-09-08 RX ADMIN — FAMOTIDINE 20 MG: 20 TABLET, FILM COATED ORAL at 22:46

## 2020-09-08 RX ADMIN — APIXABAN 2.5 MG: 2.5 TABLET, FILM COATED ORAL at 22:46

## 2020-09-08 NOTE — ED PROVIDER NOTES
Primary Care Physician: Dipak Perez MD   Attending Physician: No att. providers found     History   Chief Complaint   Patient presents with    Loss of Consciousness        HPI   Jeff Crane is a 80 y.o. male w/ h/o HLD, HTN, A-fib taking Eliquis, and multiple syncopal episodes. Patient came in today via EMS called by his wife for LOC/snycopal episode. He was leaving dinner with his wife, got in the car on the  side and wife saw him leaning on the searing wheel. Wife was able to arouse him and called 911. Upon arrival he is alert and oriented x4 and hemodynamically stable. Patient stated as he was getting in the car he was lightheaded and sweating. He did not fall and hit his head. He has experienced these symptoms prior to every syncopal episode. EMS EKG showed A-fib with HR ranging from 88-22. ED EKG showed no abnormalities and no longer in A-fib. Patient is experiencing SOB but no chest pain. Patient takes Eliquis and has seen his cardiologist for syncopal episodes.      Past Medical History:   Diagnosis Date    Cancer [facility])     Prostate    Hemorrhoids     Hyperlipidemia     Hypertension     Influenza A 12/29/2014    Shortness of breath         Past Surgical History:   Procedure Laterality Date    CARPAL TUNNEL RELEASE Right 09/01/15    Right carpal tunnel release      CARPAL TUNNEL RELEASE Left 9/22/15    COLONOSCOPY N/A 4/8/2019    COLONOSCOPY DIAGNOSTIC performed by Alphonse Slater MD at [address]      right wrist    ENDOSCOPY, COLON, DIAGNOSTIC      HERNIA REPAIR Right 1946    inguinal    UPPER GASTROINTESTINAL ENDOSCOPY      with dilatation        Family History   Problem Relation Age of Onset    Hypertension Other     Stroke Other         Social History     Socioeconomic History    Marital status:      Spouse name: None    Number of children: 1    Years of education: None    Highest education level: None   Occupational History    Occupation: Retired   Social Needs    Financial resource strain: None    Food insecurity     Worry: None     Inability: None    Transportation needs     Medical: None     Non-medical: None   Tobacco Use    Smoking status: Former Smoker     Last attempt to quit: 1955     Years since quittin.0    Smokeless tobacco: Never Used   Substance and Sexual Activity    Alcohol use: No    Drug use: No    Sexual activity: Not Currently   Lifestyle    Physical activity     Days per week: None     Minutes per session: None    Stress: None   Relationships    Social connections     Talks on phone: None     Gets together: None     Attends [employer] service: None     Active member of club or organization: None     Attends meetings of clubs or organizations: None     Relationship status: None    Intimate partner violence     Fear of current or ex partner: None     Emotionally abused: None     Physically abused: None     Forced sexual activity: None   Other Topics Concern    None   Social History Narrative    None        Review of Systems   10 total systems reviewed and found to be negative unless otherwise noted in HPI     Physical Exam   BP (!) 161/63   Pulse 82   Temp 98.8 °F (37.1 °C) (Oral)   Resp 18   Ht 5' 9\" (1.753 m)   Wt 158 lb 15.2 oz (72.1 kg)   SpO2 96%   BMI 23.47 kg/m²      CONSTITUTIONAL: Well appearing, in no acute distress   HEAD: atraumatic, normocephalic   EYES: PERRL, No injection, discharge or scleral icterus. ENT: Moist mucous membranes. NECK: Normal ROM, NO LAD   CARDIOVASCULAR: Regular rate and rhythm. No murmurs or gallop. PULMONARY/CHEST: Airway patent. No retractions. Breath sounds clear with good air entry bilaterally. ABDOMEN: Soft, Non-distended and non-tender, without guarding or rebound. SKIN: Acyanotic, warm, dry   MUSCULOSKELETAL: No swelling, tenderness or deformity   NEUROLOGICAL: Awake and oriented x 4. Pulses intact.  Grossly nonfocal   Nursing note and vitals reviewed.      ED Course & Medical Decision Making   Medications - No data to display   Labs Reviewed   CBC WITH AUTO DIFFERENTIAL - Abnormal; Notable for the following components:       Result Value    RBC 3.09 (*)     Hemoglobin 9.8 (*)     Hematocrit 28.5 (*)     All other components within normal limits    Narrative:     Performed at:  [facility]  [address], [place] [address]   Phone [phone]   COMPREHENSIVE METABOLIC PANEL W/ REFLEX TO MG FOR LOW K - Abnormal; Notable for the following components:    Glucose 202 (*)     GFR Non- 51 (*)     All other components within normal limits    Narrative:     Performed at:  [address] [facility] [place] [address]   Phone [phone]   BRAIN NATRIURETIC PEPTIDE - Abnormal; Notable for the following components:    Pro-BNP 1,162 (*)     All other components within normal limits    Narrative:     Performed at:  [facility]  [address], [place] [address]   Phone [phone]   URINALYSIS - Abnormal; Notable for the following components:    Clarity, UA CLOUDY (*)     All other components within normal limits    Narrative:     Performed at:  [facility]  [address], [place] [address]   Phone [phone]   TROPONIN - Abnormal; Notable for the following components:    Troponin 0.02 (*)     All other components within normal limits    Narrative:     Performed at:  [facility]  [address], [place] [address]   Phone [phone]   CBC - Abnormal; Notable for the following components:    RBC 3.21 (*)     Hemoglobin 9.9 (*)     Hematocrit 29.6 (*)     All other components within normal limits    Narrative:     Performed at:  [facility]  [address], [place] [address]   Phone [phone]   TROPONIN - Abnormal; Notable for the following components:    Troponin 0.02 (*)     All other components within normal limits    Narrative:     Performed at:  [facility]  [address] [facility] [place] [address]   Phone [phone]   TROPONIN    Narrative:     Performed at:  [facility]  [address] [facility] [address]   Phone [phone]   MICROSCOPIC URINALYSIS    Narrative:     Performed at:  [facility]  [address] [place] [address]   Phone [phone]   BASIC METABOLIC PANEL W/ REFLEX TO MG FOR LOW K    Narrative:     Performed at:  [facility]  [address] [facility] [address]   Phone [phone]   TROPONIN    Narrative:     Performed at:  [facility]  [address] [place], [place] [address]   Phone [phone]      XR CHEST PORTABLE   Final Result   No acute cardiopulmonary disease. Xr Chest Portable Result Date: 9/8/2020  EXAMINATION: ONE XRAY VIEW OF THE CHEST 9/8/2020 6:48 pm COMPARISON: 03/27/2020. HISTORY: ORDERING SYSTEM PROVIDED HISTORY: syncope TECHNOLOGIST PROVIDED HISTORY: Reason for exam:->syncope Reason for Exam: syncope FINDINGS: The cardiomediastinal silhouette is unremarkable. Aortic vascular calcification. Mild dependent changes at the lung bases, similar to the previous study. No focal consolidation, pleural fluid or evidence of overt failure. Biapical fibrotic changes. No acute osseous findings. No acute cardiopulmonary disease. EKG INTERPRETATION:  EKG by my preliminary interpretation shows sinus rhythm with rate of 85, normal axis, normal intervals, with no ST changes indicative of ischemia at this time.     PROCEDURES:   Procedures    ASSESSMENT AND PLAN:  Olena Goldberg is a 80 y.o. male with history of A. fib on Eliquis, prostate cancer, hypertension, hyperlipidemia, presenting this evening after a syncope event. States that he was about to get into his car when he syncopized. He did not hit head and state that he has had syncope events in the past.  While at this time his heart rate ranged from 20-80 but  blood pressure was stable. Labs apart from elevated BNP showed no other abnormalities. Chest x-ray showed no acute lung disease. With multiple syncopal episodes and shortness of breath with low HR, I think that he should be admitted for further work-up for syncope evaluation. Hospitalist consulted and patient admitted to the service for further evaluation and treatment. ClINICAL IMPRESSION:  1. Syncope and collapse          DISPOSITION    Hospitalist admit   -Findings and recommendations explained to patient, and wife. They expressed understanding and agreed with the plan. Boby Kay MD (electronically signed)  9/10/2020  _________________________________________________________________________________________  Amount and/or Complexity of Data Reviewed:  Clinical lab tests: ordered and reviewed   Tests in the radiology section of CPT®: ordered and reviewed   Tests in the medicine section of CPT®: ordered and reviewed   Decide to obtain previous medical records or to obtain history from someone other than the patient: no  Obtain history from someone other than the patient: no  Review and summarize past medical records:yes  I looked up the patient in our electronic medical record:yes  Discuss the patient with other providers:yes  Independent visualization of images, tracings, or specimens:yes  Risk of Complications, Morbidity, and/or Mortality:Moderate  Presenting problems: moderate  Management options: moderate     Critical Care Attestation   Total critical care time: 15 minutes minimum   Critical care time does not include separately billable procedures and treating other patients.    Critical care was necessary to treat or prevent imminent or life-threatening deterioration of the following conditions: Syncope and collapse with heart rate of 20. case discussed with consultants.       _________________________________________________________________________________________  This record is transcribed utilizing voice recognition technology. There are inherent limitations in this technology. In addition, there may be limitations in editing of this report. If there are any discrepancies, please contact me directly.         Peterson Benitez MD  09/10/20 2598

## 2020-09-08 NOTE — ED NOTES
Patient arrived via squad for a syncope episode today. He was at [address]  and just ate dinner, they had gotten in the car and he said he didn't feel well. His wife told squad when she looked over he had passed out, she hit his back a few times and he woke up. [facility] states patient was in Afib when they did 12 lead, patient in NSR upon arrival to ED. Patient is alert and oriented. States this has happened in the past when in Afib.      Chas Araujo RN  09/08/20 [id number]

## 2020-09-09 VITALS
RESPIRATION RATE: 18 BRPM | DIASTOLIC BLOOD PRESSURE: 63 MMHG | TEMPERATURE: 98.8 F | WEIGHT: 158.95 LBS | HEART RATE: 82 BPM | OXYGEN SATURATION: 96 % | BODY MASS INDEX: 23.54 KG/M2 | SYSTOLIC BLOOD PRESSURE: 161 MMHG | HEIGHT: 69 IN

## 2020-09-09 LAB
ANION GAP SERPL CALCULATED.3IONS-SCNC: 13 MMOL/L (ref 3–16)
BUN BLDV-MCNC: 19 MG/DL (ref 7–20)
CALCIUM SERPL-MCNC: 8.9 MG/DL (ref 8.3–10.6)
CHLORIDE BLD-SCNC: 101 MMOL/L (ref 99–110)
CO2: 24 MMOL/L (ref 21–32)
CREAT SERPL-MCNC: 1.1 MG/DL (ref 0.8–1.3)
EKG ATRIAL RATE: 85 BPM
EKG DIAGNOSIS: NORMAL
EKG P AXIS: 55 DEGREES
EKG P-R INTERVAL: 228 MS
EKG Q-T INTERVAL: 414 MS
EKG QRS DURATION: 86 MS
EKG QTC CALCULATION (BAZETT): 492 MS
EKG R AXIS: -25 DEGREES
EKG T AXIS: 43 DEGREES
EKG VENTRICULAR RATE: 85 BPM
GFR AFRICAN AMERICAN: >60
GFR NON-AFRICAN AMERICAN: >60
GLUCOSE BLD-MCNC: 99 MG/DL (ref 70–99)
HCT VFR BLD CALC: 29.6 % (ref 40.5–52.5)
HEMOGLOBIN: 9.9 G/DL (ref 13.5–17.5)
MCH RBC QN AUTO: 30.7 PG (ref 26–34)
MCHC RBC AUTO-ENTMCNC: 33.3 G/DL (ref 31–36)
MCV RBC AUTO: 92.3 FL (ref 80–100)
PDW BLD-RTO: 15.3 % (ref 12.4–15.4)
PLATELET # BLD: 180 K/UL (ref 135–450)
PMV BLD AUTO: 8.6 FL (ref 5–10.5)
POTASSIUM REFLEX MAGNESIUM: 4.4 MMOL/L (ref 3.5–5.1)
RBC # BLD: 3.21 M/UL (ref 4.2–5.9)
SODIUM BLD-SCNC: 138 MMOL/L (ref 136–145)
TROPONIN: 0.02 NG/ML
TROPONIN: 0.02 NG/ML
TROPONIN: <0.01 NG/ML
WBC # BLD: 6.4 K/UL (ref 4–11)

## 2020-09-09 PROCEDURE — C1764 EVENT RECORDER, CARDIAC: HCPCS

## 2020-09-09 PROCEDURE — 80048 BASIC METABOLIC PNL TOTAL CA: CPT

## 2020-09-09 PROCEDURE — 84484 ASSAY OF TROPONIN QUANT: CPT

## 2020-09-09 PROCEDURE — 94760 N-INVAS EAR/PLS OXIMETRY 1: CPT

## 2020-09-09 PROCEDURE — 33285 INSJ SUBQ CAR RHYTHM MNTR: CPT | Performed by: INTERNAL MEDICINE

## 2020-09-09 PROCEDURE — 33285 INSJ SUBQ CAR RHYTHM MNTR: CPT | Performed by: FAMILY MEDICINE

## 2020-09-09 PROCEDURE — 0JH602Z INSERTION OF MONITORING DEVICE INTO CHEST SUBCUTANEOUS TISSUE AND FASCIA, OPEN APPROACH: ICD-10-PCS | Performed by: INTERNAL MEDICINE

## 2020-09-09 PROCEDURE — 2580000003 HC RX 258: Performed by: NURSE PRACTITIONER

## 2020-09-09 PROCEDURE — 85027 COMPLETE CBC AUTOMATED: CPT

## 2020-09-09 PROCEDURE — 6370000000 HC RX 637 (ALT 250 FOR IP): Performed by: NURSE PRACTITIONER

## 2020-09-09 PROCEDURE — 1200000000 HC SEMI PRIVATE

## 2020-09-09 PROCEDURE — 93010 ELECTROCARDIOGRAM REPORT: CPT | Performed by: INTERNAL MEDICINE

## 2020-09-09 PROCEDURE — 99223 1ST HOSP IP/OBS HIGH 75: CPT | Performed by: INTERNAL MEDICINE

## 2020-09-09 PROCEDURE — 2500000003 HC RX 250 WO HCPCS

## 2020-09-09 RX ORDER — SODIUM CHLORIDE 0.9 % (FLUSH) 0.9 %
10 SYRINGE (ML) INJECTION EVERY 12 HOURS SCHEDULED
Status: CANCELLED | OUTPATIENT
Start: 2020-09-09

## 2020-09-09 RX ORDER — SODIUM CHLORIDE 0.9 % (FLUSH) 0.9 %
10 SYRINGE (ML) INJECTION PRN
Status: CANCELLED | OUTPATIENT
Start: 2020-09-09

## 2020-09-09 RX ADMIN — SODIUM CHLORIDE, PRESERVATIVE FREE 10 ML: 5 INJECTION INTRAVENOUS at 09:02

## 2020-09-09 RX ADMIN — FAMOTIDINE 20 MG: 20 TABLET, FILM COATED ORAL at 09:01

## 2020-09-09 RX ADMIN — APIXABAN 2.5 MG: 2.5 TABLET, FILM COATED ORAL at 09:01

## 2020-09-09 NOTE — H&P
H&P Update    I have reviewed the history and physical from Dr. Rachael Villalobos on 2020 and examined the patient and find no relevant changes. I have reviewed with the patient and/or family the risks, benefits, and alternatives to the procedure. Pre-sedation Assessment    Patient:  Darvin Boland   :   1926  Intended Procedure: [employer] Implantable Loop Recorder implantation      Agenda nurses notes reviewed and agreed.   Medications reviewed  Allergies: No Known Allergies      Pre-Procedure Assessment/Plan:  ASA 2 - Patient with mild systemic disease with no functional limitations    Level of Sedation Plan:No sedation    Post Procedure plan: Return to same level of care

## 2020-09-09 NOTE — DISCHARGE SUMMARY
Hospital Medicine Discharge Summary    Patient ID: Steve Henson      Patient's PCP: Marline Velarde MD    Admit Date: 9/8/2020     Discharge Date:   9/9/20    Admitting Physician: Erlin Nazario DO     Discharge Physician: Alonso Abdul MD     Discharge Diagnoses: Active Hospital Problems    Diagnosis Date Noted    Symptomatic bradycardia [R00.1] 07/06/2018    Syncope and collapse [R55]     Paroxysmal atrial fibrillation (HCC) [I48.0] 06/27/2017    Iron deficiency anemia due to chronic blood loss [D50.0] 03/17/2016    Prostate cancer ([facility] [address] Amada Monzon 09/25/2014    HTN (hypertension) [I10] 07/12/2010    Hyperlipidemia [E78.5] 06/29/2010       The patient was seen and examined on day of discharge and this discharge summary is in conjunction with any daily progress note from day of discharge. Hospital Course:   80 y.o. male with PMHx of Intermatic bradycardia, syncope and collapse, hyperlipidemia, hypertension, anemia, paroxysmal atrial fibrillation presented to [facility] emergency department after reporting that he passed out while getting into his vehicle. His wife who was next to him was unable to arouse him so she called 911. There was no report of a fall and there was no report that the patient struck his head. EMS reports that the patient was sitting up and talkative upon their arrival.  The patient states that he got a lightheaded or even dizzy feeling and began sweating just prior to the passing out episode. The patient does admit to having several of these in the past.  He states that he has seen cardiologist for this. He states at times his heart rate becomes very low and they think this is the cause of his syncopal episodes. The patient currently denies lightheadedness, dizziness, chest pain, shortness of breath or nausea.     The emergency department course shows that the EMS 3-lead had the patient in an atrial fibrillation with a controlled rate.   The EKG in the ER showed that the patient was no longer in atrial fibrillation. Patient takes Eliquis and has not missed any doses for the atrial fibrillation. Patient was admitted to the hospital for syncope and collapse. Orthostatics were performed and did not show any issues. Cardiology was consulted as the patient follows with them in the outpatient for these exact same situations. He has been asked to have a loop recorder placed and/or pacemaker to which he has been apprehensive. Cardiology on this admission agreed that the patient needs a loop recorder and the patient finally agreed. A loop recorder was placed on September 9 patient was stable for discharge home. Patient will continue home on his regular medications. Exam:     BP (!) 133/57   Pulse 75   Temp 97.8 °F (36.6 °C) (Oral)   Resp 16   Ht 5' 9\" (1.753 m)   Wt 158 lb 15.2 oz (72.1 kg)   SpO2 97%   BMI 23.47 kg/m²     General appearance: No apparent distress, appears stated age and cooperative. HEENT: Pupils equal, round, and reactive to light. Conjunctivae/corneas clear. Neck: Supple, with full range of motion. No jugular venous distention. Trachea midline. Respiratory:  Normal respiratory effort. Clear to auscultation, bilaterally without Rales/Wheezes/Rhonchi. Cardiovascular: Regular rate and rhythm with normal S1/S2 without murmurs, rubs or gallops. Abdomen: Soft, non-tender, non-distended with normal bowel sounds. Musculoskeletal: No clubbing, cyanosis or edema bilaterally. Full range of motion without deformity. Skin: Skin color, texture, turgor normal.  No rashes or lesions. Neurologic:  Neurovascularly intact without any focal sensory/motor deficits.  Cranial nerves: II-XII intact, grossly non-focal.  Psychiatric: Alert and oriented, thought content appropriate, normal insight      Consults:     IP CONSULT TO HOSPITALIST  IP CONSULT TO CARDIOLOGY    Significant Diagnostic Studies:     XR CHEST PORTABLE   Final Result   No acute cardiopulmonary disease. Disposition:  Home     Condition at Discharge: Stable    Discharge Instructions/Follow-up:  PCP    Code Status:  Full Code     Activity: activity as tolerated    Diet: regular diet      Labs: For convenience and continuity at follow-up the following most recent labs are provided:      CBC:    Lab Results   Component Value Date    WBC 6.4 09/09/2020    HGB 9.9 09/09/2020    HCT 29.6 09/09/2020     09/09/2020       Renal:    Lab Results   Component Value Date     09/09/2020    K 4.4 09/09/2020     09/09/2020    CO2 24 09/09/2020    BUN 19 09/09/2020    CREATININE 1.1 09/09/2020    CALCIUM 8.9 09/09/2020       Discharge Medications:     Current Discharge Medication List           Details   apixaban (ELIQUIS) 2.5 MG TABS tablet TAKE ONE TABLET BY MOUTH TWICE A DAY  Qty: 180 tablet, Refills: 3      Dietary Management Product (SENTRA AM PO) Take by mouth daily Indications: For men      Multiple Minerals-Vitamins (PROSTEON PO) Take 2 tablets by mouth 2 times daily       Misc. Devices (WALKER) MISC 1 each by Does not apply route daily Dispense and Fit for injury to lower extremity and weakness. Qty: 1 each, Refills: 0      leuprolide acetate, 6 Month, (ELIGARD) 45 MG injection Inject 45 mg into the skin every 6 months      albuterol sulfate  (90 Base) MCG/ACT inhaler Inhale 2 puffs into the lungs 4 times daily as needed for Wheezing  Qty: 3 Inhaler, Refills: 1             Future Appointments   Date Time Provider Sue Li   9/9/2020  4:00 PM [facility] CARDIAC CATH LAB PRE POST 1 [facility] CATH [facility]   9/10/2020 12:00 PM [facility] CARDIAC CATH LAB PRE POST 1 [facility] CATH [facility]       Time Spent on discharge is more than 30 minutes in the examination, evaluation, counseling and review of medications and discharge plan. Signed:    Jeanine Flores MD   9/9/2020      Thank you Candice Atkinson MD for the opportunity to be involved in this patient's care.  If you have any questions or concerns please feel free to contact me at [phone].

## 2020-09-09 NOTE — PROGRESS NOTES
Patient is okay to be d/c home this afternoon after loop recorder placement. Echo does not need to be done prior to d/c per Dr. Raquel Mcmahon.

## 2020-09-09 NOTE — PROGRESS NOTES
Patient resting in bed this morning w/o complaint. Patient alert and oriented x4, denies pain/ nausea. Morning medications administered without complication. Call light, telephone, and bed side table are within reach. Fall precautions in place (bed alarm on, non slip socks in place). Patient denies further physical/emotional needs at this time. Will continue to monitor and assess.

## 2020-09-09 NOTE — PLAN OF CARE
Problem: Falls - Risk of:  Goal: Will remain free from falls  Description: Will remain free from falls  Outcome: Ongoing  Note: Patient remains free from falls during this shift. Fall precautions remain in place. Patient educated on the need to implement call light use prior to ambulation. Will continue to monitor and assess. Goal: Absence of physical injury  Description: Absence of physical injury  Outcome: Ongoing  Note: Patient remains free from physical harm during this shift. Will continue to monitor and assess patient. Problem: SAFETY  Goal: Free from accidental physical injury  Outcome: Ongoing  Note: Patient remains free from physical harm during this shift. Will continue to monitor and assess patient. Goal: Free from intentional harm  Outcome: Ongoing     Problem: DAILY CARE  Goal: Daily care needs are met  Outcome: Ongoing  Note: Patient reports that needs are met at this time, will continue to monitor and assess      Problem: SKIN INTEGRITY  Goal: Skin integrity is maintained or improved  Outcome: Ongoing  Note: Patient skin condition and mucus membrane integrity remain unchanged during this shift. Skin breakdown prevention interventions are in place. Will continue to monitor and assess.

## 2020-09-09 NOTE — PROGRESS NOTES
Data- discharge order received, pt verbalized agreement to discharge, disposition to previous residence, no needs for HHC/DME. Action- discharge instructions prepared and given to patient and wife, pt verbalized understanding. Medication information packet given r/t NEW and/or CHANGED prescriptions emphasizing name/purpose/side effects, pt verbalized understanding. Discharge instruction summary: Diet- as tolerated, Activity- as tolerated, Primary Care Physician as follows: Dipak Perez [phone] f/u appointment to be scheduled by patient in 1 week, immunizations reviewed, prescription medications same as prior to admission. Response- Pt belongings gathered, IV removed. Disposition is home (no HHC/DME needs), transported with wife and god-daughter, taken to lobby via w/c w/ this RN and PCA Amada, no complications.

## 2020-09-09 NOTE — CARE COORDINATION
INITIAL CASE MANAGEMENT ASSESSMENT     Reviewed chart, met with patient to assess possible discharge needs. Explained Case Management role/services.      Living Situation:  Lives at home with his wife.      ADLs: Pt is independent.     DME: None     PT/OT Recs: None     Active Services: Pt states he is not active with any outside agencies. Has been active with [address] in the past.     Transportation: Family to transport at discharge. PCP: Dejon Adam MD     PLAN/COMMENTS: Pt states he plans to d/c home with support from spouse and family. SW making referral to [facility].      SW/CM provided contact information for patient or family to call with any questions.   SW/CM will follow and assist as needed.

## 2020-09-09 NOTE — PROGRESS NOTES
Medication Reconciliation     List of medications patient is currently taking is complete. Source of information:   1. Conversation with patient at bedside  2. EPIC records        Notes regarding home medications:  1. Patient received all of his morning home medications today.       Massiel Duran, Pharmacy Intern  9/8/2020 9:25 PM

## 2020-09-09 NOTE — ED NOTES
Report called to Nathaly Marcelo RN on 3W. All questions answered.      Ariel Cantu RN  09/08/20 2930

## 2020-09-09 NOTE — CONSULTS
[address]  Cardiology Consult Note        CC:      Episode of unresponsiveness           HPI:   This is a 80 y.o. male who had a brief episode of consciousness yesterday. His wife who is with him claims that the came after having lunch from a restaurant. The patient states that he walked to the car and was ready to take his ignition on when all of a sudden he became unresponsive for short period he then came back spontaneously. The patient claims that he remembers feeling lightheaded. The patient has had 1 or 2 similar episodes. The 30-day event monitors have so far been negative for bradycardia.   Wife called the squad who brought him here      Past Medical History:   Diagnosis Date    Cancer [facility])     Prostate    Hemorrhoids     Hyperlipidemia     Hypertension     Influenza A 2014    Shortness of breath       Past Surgical History:   Procedure Laterality Date    CARPAL TUNNEL RELEASE Right 09/01/15    Right carpal tunnel release      CARPAL TUNNEL RELEASE Left 9/22/15    COLONOSCOPY N/A 2019    COLONOSCOPY DIAGNOSTIC performed by Jose Jules MD at [address]      right wrist    ENDOSCOPY, COLON, DIAGNOSTIC      HERNIA REPAIR Right 194    inguinal    UPPER GASTROINTESTINAL ENDOSCOPY      with dilatation      Family History   Problem Relation Age of Onset    Hypertension Other     Stroke Other       Social History     Tobacco Use    Smoking status: Former Smoker     Last attempt to quit: 1955     Years since quittin.0    Smokeless tobacco: Never Used   Substance Use Topics    Alcohol use: No    Drug use: No     No Known Allergies   apixaban  2.5 mg Oral BID    sodium chloride flush  10 mL Intravenous 2 times per day    famotidine  20 mg Oral Daily       Review of Systems -   Constitutional: Negative for weight gain/loss; malaise, fever  Respiratory: Negative for Asthma;  cough and hemoptysis  Cardiovascular: Negative for palpitations,dizziness   Gastrointestinal: Negative for abd.pain; constipation/diarrhea;    Genitourinary: Negative for stones; hematuria; frequency hesitancy  Integumentt: Negative for rash or pruritis  Hematologic/lymphatic: Negative for blood dyscrasia; leukemia/lymphoma  Musculoskeletal: Negative for Connective tissue disease  Neurological:  Negative for Seizure   Behavioral/Psych:Negative for Bipolar disorder, Schizophrenia; Dementia  Endocrine: negative for thyroid, parathyroid disease      Intake/Output Summary (Last 24 hours) at 9/9/2020 1517  Last data filed at 9/9/2020 0749  Gross per 24 hour   Intake --   Output 650 ml   Net -650 ml       Physical Examination:    BP (!) 133/57   Pulse 75   Temp 97.8 °F (36.6 °C) (Oral)   Resp 16   Ht 5' 9\" (1.753 m)   Wt 158 lb 15.2 oz (72.1 kg)   SpO2 97%   BMI 23.47 kg/m²    HEENT:  Face: Atraumatic, Conjunctiva: Pink; non icteric,  Mucous Memb:  Moist, No thyromegaly or Lymphadenopathy  Respiratory:  Resp Assessment: normal, Resp Auscultation: clear   Cardiovascular: Auscultation: nl S1 & S2, Palpation:  Nl PMI;  No heaves or thrills, JVP:  normal  Abdomen: Soft, non-tender, Normal bowel sounds,  No organomegaly  Extremities: No Cyanosis or Clubbing; Edema none  Neurological: Oriented to time, place, and person, Non-anxious  Psychiatric: Normal mood and affect  Skin: Warm and dry,  No rash seen      Current Facility-Administered Medications: apixaban (ELIQUIS) tablet 2.5 mg, 2.5 mg, Oral, BID  sodium chloride flush 0.9 % injection 10 mL, 10 mL, Intravenous, 2 times per day  sodium chloride flush 0.9 % injection 10 mL, 10 mL, Intravenous, PRN  acetaminophen (TYLENOL) tablet 650 mg, 650 mg, Oral, Q6H PRN **OR** acetaminophen (TYLENOL) suppository 650 mg, 650 mg, Rectal, Q6H PRN  polyethylene glycol (GLYCOLAX) packet 17 g, 17 g, Oral, Daily PRN  promethazine (PHENERGAN) tablet 12.5 mg, 12.5 mg, Oral, Q6H PRN **OR** ondansetron (ZOFRAN) injection 4 mg, 4 mg, Intravenous, Q6H PRN  famotidine (PEPCID) tablet 20 mg, 20 mg, Oral, Daily      Labs:   Recent Labs     09/08/20  1831 09/09/20  0528   WBC 7.6 6.4   HGB 9.8* 9.9*   HCT 28.5* 29.6*    180     Recent Labs     09/08/20  1831 09/09/20  0528    138   K 4.3 4.4   CO2 24 24   BUN 20 19   CREATININE 1.3 1.1   GLUCOSE 202* 99     No results for input(s): BNP in the last 72 hours. No results for input(s): PROTIME, INR in the last 72 hours. No results for input(s): APTT in the last 72 hours. Recent Labs     09/08/20  2322 09/09/20  0528 09/09/20  1133   TROPONINI <0.01 0.02* 0.02*     Lab Results   Component Value Date    HDL 81 07/27/2020    HDL 69 03/05/2012    LDLCALC 115 07/27/2020    TRIG 69 07/27/2020     Recent Labs     09/08/20  1831   AST 21   ALT 13   LABALBU 3.8         EKG:       Chest X-Ray:       ECHO:   Left ventricular cavity size is normal. There is moderate asymmetric  hypertrophy of the basal septum. Overall left ventricular systolic function   appears hyperdynamic. Ejection fraction is visually estimated to be >70%. No  regional wall motion abnormalities are noted. There is a late peaking  gradient recorded across the LV outflow tract with a peak gradient of  52mmHg. with Valsalva maneuver. Mitral annular calcification is present. Mitral valve leaflets appear mildly thickened. systolic ant motion of anterior mitral leaflet. Moderate mitral regurgitation. Aortic valve appears sclerotic but appears to open adequately. Mild tricuspid regurgitation. ASSESSMENT AND PLAN:        2nd-3rd episode of unresponsiveness that was brief. He did not lose body tone and remained sitting. There was no seizure-like activity  Past Holter/event monitors have been negative  Will recommend a loop recorder to rule out bradyarrhythmias  I have contacted EP who will deploy the device.   He may go home after that      Edi Atkinson M.D  9/9/2020

## 2020-09-09 NOTE — H&P
Hospital Medicine History & Physical      PCP: Marc Barrett MD    Date of Admission: 9/8/2020    Date of Service: Pt seen/examined on 9/8/2020 and Admitted to Inpatient with expected LOS greater than two midnights due to medical therapy. Chief Complaint:  \"passed out again\"      History Of Present Illness:      80 y.o. male with PMHx of Intermatic bradycardia, syncope and collapse, hyperlipidemia, hypertension, anemia, paroxysmal atrial fibrillation presented to [facility] emergency department after reporting that he passed out while getting into his vehicle. His wife who was next to him was unable to arouse him so she called 911. There was no report of a fall and there was no report that the patient struck his head. EMS reports that the patient was sitting up and talkative upon their arrival.  The patient states that he got a lightheaded or even dizzy feeling and began sweating just prior to the passing out episode. The patient does admit to having several of these in the past.  He states that he has seen cardiologist for this. He states at times his heart rate becomes very low and they think this is the cause of his syncopal episodes. The patient currently denies lightheadedness, dizziness, chest pain, shortness of breath or nausea. The emergency department course shows that the EMS 3-lead had the patient in an atrial fibrillation with a controlled rate. The EKG in the ER showed that the patient was no longer in atrial fibrillation. Patient takes Eliquis and has not missed any doses for the atrial fibrillation.     Past Medical History:          Diagnosis Date    Cancer [facility])     Prostate    Hemorrhoids     Hyperlipidemia     Hypertension     Influenza A 12/29/2014    Shortness of breath        Past Surgical History:          Procedure Laterality Date    CARPAL TUNNEL RELEASE Right 09/01/15    Right carpal tunnel release      CARPAL TUNNEL RELEASE Left 9/22/15    COLONOSCOPY N/A 4/8/2019    COLONOSCOPY DIAGNOSTIC performed by Jose Jules MD at [address]      right wrist    ENDOSCOPY, COLON, DIAGNOSTIC      HERNIA REPAIR Right 1946    inguinal    UPPER GASTROINTESTINAL ENDOSCOPY      with dilatation       Medications Prior to Admission:      Prior to Admission medications    Medication Sig Start Date End Date Taking? Authorizing Provider   apixaban (ELIQUIS) 2.5 MG TABS tablet TAKE ONE TABLET BY MOUTH TWICE A DAY 5/27/20  Yes Venus Rodrigez MD   Dietary Management Product (SENTRA AM PO) Take by mouth daily Indications: For men   Yes Historical Provider, MD   Multiple Minerals-Vitamins (PROSTEON PO) Take 2 tablets by mouth 2 times daily    Yes Historical Provider, MD   Misc. Devices (WALKER) MISC 1 each by Does not apply route daily Dispense and Fit for injury to lower extremity and weakness. 3/27/20   Tasneem Adair PA-C   leuprolide acetate, 6 Month, (ELIGARD) 45 MG injection Inject 45 mg into the skin every 6 months    Historical Provider, MD   albuterol sulfate  (90 Base) MCG/ACT inhaler Inhale 2 puffs into the lungs 4 times daily as needed for Wheezing 8/13/19   C Srini Licona MD       Allergies:  Patient has no known allergies. Social History:      The patient currently lives with his wife    TOBACCO:   reports that he quit smoking about 65 years ago. He has never used smokeless tobacco.  ETOH:   reports no history of alcohol use. Family History:      Reviewed in detail positive as follows:        Problem Relation Age of Onset    Hypertension Other     Stroke Other        REVIEW OF SYSTEMS:   Pertinent positives as noted in the HPI. All other systems reviewed and negative.     PHYSICAL EXAM PERFORMED:    BP (!) 160/69   Pulse 93   Temp 98.7 °F (37.1 °C) (Oral)   Resp 16   Ht 5' 9\" (1.753 m)   Wt 158 lb 15.2 oz (72.1 kg)   SpO2 97%   BMI 23.47 kg/m²     General appearance:  No apparent distress, appears stated age and cooperative. HEENT:  Normal cephalic, atraumatic without obvious deformity. Pupils equal, round, and reactive to light. Extra ocular muscles intact. Conjunctivae/corneas clear. Neck: Supple, with full range of motion. No jugular venous distention. Trachea midline. Respiratory:  Normal respiratory effort. Clear to auscultation, bilaterally without Rales/Wheezes/Rhonchi. Cardiovascular:  Regular rate and rhythm without murmurs, rubs or gallops. Abdomen: Soft, non-tender, non-distended, without rebound or guarding. Normal bowel sounds. Musculoskeletal:  No clubbing, cyanosis or edema bilaterally. Full range of motion without deformity. Skin: Skin color, texture, turgor normal.  No rashes or lesions. Neurologic:  Neurovascularly intact without any focal sensory/motor deficits. Cranial nerves: II-XII intact, grossly non-focal.  Psychiatric:  Alert and oriented, thought content appropriate, normal insight  Capillary Refill: Brisk,< 3 seconds   Peripheral Pulses: +2 palpable, equal bilaterally       Labs:     Recent Labs     09/08/20  1831   WBC 7.6   HGB 9.8*   HCT 28.5*        Recent Labs     09/08/20  1831      K 4.3   CL 99   CO2 24   BUN 20   CREATININE 1.3   CALCIUM 9.3     Recent Labs     09/08/20  1831   AST 21   ALT 13   BILITOT <0.2   ALKPHOS 56     No results for input(s): INR in the last 72 hours.   Recent Labs     09/08/20  1831 09/08/20  2322   TROPONINI <0.01 <0.01       Urinalysis:      Lab Results   Component Value Date    NITRU Negative 09/08/2020    WBCUA None seen 09/08/2020    RBCUA 0-2 09/08/2020    BLOODU Negative 09/08/2020    SPECGRAV 1.015 09/08/2020    GLUCOSEU Negative 09/08/2020    GLUCOSEU NEGATIVE 05/31/2011       Radiology:     CXR: I have reviewed the CXR with the following interpretation: No acute cardiopulmonary disease  EKG:  I have reviewed the EKG with the following interpretation: No acute ECG changes    XR CHEST PORTABLE   Final Result   No acute cardiopulmonary disease. ASSESSMENT:    Active Hospital Problems    Diagnosis Date Noted    Symptomatic bradycardia [R00.1] 07/06/2018    Syncope and collapse [R55]     Paroxysmal atrial fibrillation ([facility] [address] [I48.0] 06/27/2017    Iron deficiency anemia due to chronic blood loss [D50.0] 03/17/2016    Prostate cancer ([facility] Carlita Chain 09/25/2014    HTN (hypertension) [I10] 07/12/2010    Hyperlipidemia [E78.5] 06/29/2010         PLAN:    Syncope and collapse  -Orthostatics  -Inpatient consult to cardiology  -Continuous telemetry  -Serial troponins  -Patient had a 30-day cardiac event monitor in May of this year where the patient was noted to be in a sinus rhythm with PACs but no obvious atrial fibrillation.  -Last echo was July 2018  Summary   Left ventricular cavity size is normal. There is moderate asymmetric   hypertrophy of the basal septum. Overall left ventricular systolic function   appears hyperdynamic. Ejection fraction is visually estimated to be >70%. No   regional wall motion abnormalities are noted. There is a late peaking   gradient recorded across the LV outflow tract with a peak gradient of   52mmHg. with Valsalva maneuver. Mitral annular calcification is present. Mitral valve leaflets appear mildly thickened. systolic ant motion of anterior mitral leaflet. Moderate mitral regurgitation. Aortic valve appears sclerotic but appears to open adequately. Mild tricuspid regurgitation.    -Will order another echocardiogram.      Paroxysmal atrial fibrillation  -Continue Eliquis      Hypertension  -On no medications? ? Hyperlipidemia  -On no medications    Iron deficiency anemia   -Hemoglobin and hematocrit are 9.8 and 28.5.   Chart review July of this year she has a hemoglobin of 10.1 and hematocrit of 29.8      DVT Prophylaxis: takes eliquis  Diet: DIET CARDIAC;  Code Status: Full Code    PT/OT Eval Status: ordered    Dispo - Admission       Valera Cockayne, VALDEMAR - CNP    Thank you Katya Tovar, MD for the opportunity to be involved in this patient's care. If you have any questions or concerns please feel free to contact me at [phone].

## 2020-09-09 NOTE — PROGRESS NOTES
4 Eyes Skin Assessment     The patient is being assess for  Admission    I agree that 2 RN's have performed a thorough Head to Toe Skin Assessment on the patient. ALL assessment sites listed below have been assessed. Areas assessed by both nurses:   [x]   Head, Face, and Ears   [x]   Shoulders, Back, and Chest  [x]   Arms, Elbows, and Hands   [x]   Coccyx, Sacrum, and IschIum  [x]   Legs, Feet, and Heels        Does the Patient have Skin Breakdown?   No         Sherman Prevention initiated:  NA   Wound Care Orders initiated:  NA      [facility] nurse consulted for Pressure Injury (Stage 3,4, Unstageable, DTI, NWPT, and Complex wounds), New and Established Ostomies:  NA      Nurse 1 eSignature: Electronically signed by Stefania Meeks RN on 9/9/20 at 12:13 AM EDT    **SHARE this note so that the co-signing nurse is able to place an eSignature**    Nurse 2 eSignature: {Esignature:[id number]}

## 2020-09-09 NOTE — PROCEDURES
[address]     Electrophysiology Procedure Note       Date of Procedure: 9/9/2020  Patient's Name: Sil Barriga  YOB: 1926   Medical Record Number: [id number]  Procedure Performed by: Cezar Martin MD    Procedures performed:    · Loop recorder implantation    Indication of the procedure: Syncope, Palpitation   Sil Barriga is a 80 y.o. male with syncope of unclear etiology even after undergoing 30-day event monitor this past May 2020. Because the patient is at risk of having cardiac dysrhythmia, particularly atrial fibrillation, the decision was made to undergo a procedure that would afford long term rhythm detection. Therefore, the patient has been scheduled to undergo an ILR implantation. Details of procedure:   Procedure's risks, benefits and alternatives of procedure were explained to patient. The risks including, but not limited to, the risks of vascular injury, bleeding, infection, device malfunction, injury to cardiac and surrounding structures (including pneumothorax), stroke, myocardial infarction and death were discussed in detail. The patient was also counseled at length about the risks of lana Covid-19 in the michell-operative and post-operative states including the recovery window of their procedure. The patient was made aware that lana Covid-19 after a surgical procedure may worsen their prognosis for recovering from the virus and lend to a higher morbidity and or mortality risk. The patient was given the option of postponing their procedure. All questions were answered. Patient understood and informed consent was obtained. The patient was brought to the electrophysiology lab in a fasting nonsedated state. Patient was prepped and draped in usual sterile fashion. No moderate sedation (conscious sedation) nor general anesthesia was administered or utilized for this procedure.  The patient was monitored continuously with ECG, pulse oximetry, blood pressure monitoring, and direct observation. After injection of 0.5% bupivacaine/lidocaine mixture in the left 4th intercostal space, a 5 mm small incision was made using the Medtronic-provided scalpel, after which a #11 blade was used to expand the opening of this incision on both ends. Then a Medtronic-provided tunneling tool was inserted into this scar in a diagonal trajectory towards the L nipple which created the necessary space to insert the implantable loop recorder. This tool was used to deliver the implantable loop recorder into the created space. Both the plunger and the tunneling tool was then retracted. The surgical scar was taped with Steri-strips and manual pressure was held over the taped area to achieve hemostasis. Specimen collected: none    Estimated blood loss: < 5 cc    The patient tolerated the procedure well without any complications. Device information:   The device is a [employer] Reveal LINQ [id number] SN# [id number] with implant date: 9/9/2020  The device was programmed to detect:   VT: 380 ms 16 beats   Bradycardia: 2000 ms     Impression:    Pre- and post-procedural diagnoses of syncope of unclear etiology with 30-day event monitor in May 2020 which was unrevealing with successful implantation of a Medtronic Implantable Loop Recorder. Plan:   The patient will have usual post-implant care with a 1-week wound check with our nurse in the [facility] at [facility]. From an EP perspective, the patient may be discharged home today if the patient remains stable. Follow-up also includes routine device interrogation with the Device Clinic. Thank you for allowing us to participate in the care of your patient. If you have any questions or comments, please feel free to contact us.     Aman Capone MD, MS, [facility], [facility] Electrophysiology  [address]  [address] [facility], [address] [facility]  [phone]

## 2020-09-09 NOTE — PROGRESS NOTES
Patient aware of need to be NPO starting now for loop recorder placement this afternoon per verbal direction from Dr. Brie Lance. Patient agreeable to loop recorder placement. No orders for consent in at this time, but procedure is to occur at 1600 per Dr. Brie Lance. Patient anticipates d/c after loop recorder placement. Patient denies physical/emotional needs at this time. Wife at the bedside. Will continue to monitor and assess.

## 2020-09-09 NOTE — PROGRESS NOTES
Patient returned to unit from loop recorder placement. VSS. Patient denies pain. Dressing to L chest intact with a scant amount of serosanguinous drainage present. Patient and wife educated that dressing is to remain in place for 1 week w/ steri strips underneath. Patient to f/u outpatient for a wound check in 1 week per IR RN. Patient and wife agreeable to d/c home tonight. D/c order in place. Patient reports ride will be here at approximately 1730. This RN to prepare d/c paperwork. Will continue to monitor and assess.

## 2020-09-09 NOTE — PROGRESS NOTES
Pt arrived to unit at 2222 via stretcher from ED. Pt is alert and oriented X4. Pt oriented to unit and to room. Pt oriented to call light and to phone. White board updated. Pt denies any further needs at this time. Fall contract signed.     Electronically signed by Talia Contreras RN on 9/9/2020 at 12:12 AM

## 2020-09-10 ENCOUNTER — CARE COORDINATION (OUTPATIENT)
Dept: CASE MANAGEMENT | Age: 85
End: 2020-09-10

## 2020-09-10 NOTE — CARE COORDINATION
Patient contacted regarding recent discharge and COVID-19 risk. COVID-19 testing not done during recent admission. Care Transition Nurse contacted the family by telephone to perform post discharge assessment. Verified name and  with family as identifiers. Patient has following COVID-19 risk factors: cancer. CTN reviewed discharge instructions, medical action plan and red flags related to discharge diagnosis. Reviewed and educated them on any new and changed medications related to discharge diagnosis. Advised obtaining a 90-day supply of all daily and as-needed medications. Education provided regarding infection prevention, and signs and symptoms of COVID-19 and when to seek medical attention with family who verbalized understanding. Discussed exposure protocols and quarantine from [address] you at higher risk for severe illness  and given an opportunity for questions and concerns. From CDC: Are you at higher risk for severe illness? For more information on steps you can take to protect yourself, see CDC's How to Protect Yourself. Wearing loop recorder as instructed. Reports he feels well today. No further episodes of syncope. CTN provided contact information for future reference. Plan for follow-up call in 5-7 days based on severity of symptoms and risk factors.     Sandy Page RN  Care Transition Nurse  [phone] mobile    Future Appointments   Date Time Provider Sue Li   2020 10:00 AM SCHEDULE, GERMAINE CARDIO GERMAINE CARD

## 2020-09-16 ENCOUNTER — NURSE ONLY (OUTPATIENT)
Dept: CARDIOLOGY CLINIC | Age: 85
End: 2020-09-16
Payer: MEDICARE

## 2020-09-16 ENCOUNTER — TELEPHONE (OUTPATIENT)
Dept: CARDIOLOGY CLINIC | Age: 85
End: 2020-09-16

## 2020-09-16 PROBLEM — Z95.818 STATUS POST PLACEMENT OF IMPLANTABLE LOOP RECORDER: Status: ACTIVE | Noted: 2020-09-16

## 2020-09-16 PROCEDURE — 93291 INTERROG DEV EVAL SCRMS IP: CPT | Performed by: INTERNAL MEDICINE

## 2020-09-16 NOTE — TELEPHONE ENCOUNTER
Called/spoke to pt's wife - Dede Saul - she had some questions regarding pt's ICD device and how does the transmissions work and to do a manual transmission. Answered all questions.

## 2020-10-20 ENCOUNTER — NURSE ONLY (OUTPATIENT)
Dept: CARDIOLOGY CLINIC | Age: 85
End: 2020-10-20
Payer: MEDICARE

## 2020-10-20 PROCEDURE — 93298 REM INTERROG DEV EVAL SCRMS: CPT | Performed by: INTERNAL MEDICINE

## 2020-10-20 PROCEDURE — G2066 INTER DEVC REMOTE 30D: HCPCS | Performed by: INTERNAL MEDICINE

## 2020-10-20 NOTE — LETTER
[address] [phone]  [address] [phone]    Pacemaker/Defibrillator Clinic          10/20/20        [address] [facility] [address] [address]        Dear Deepthi Marsh    This letter is to inform you that we received the transmission from your monitor at home that checks your implanted heart device. The next date your monitor will automatically transmit will be 11-23-20. If your report needs attention we will notify you. Your device and monitor are wireless and most transmit cellularly, but please periodically check your monitor is still plugged in to the electrical outlet. If you still use the telephone land line to send please ensure the connection to the phone arben is secure. This will help to ensure successful automatic transmissions in the future. Also, the monitor needs to be close to you while sleeping at night. Please be aware that the remote device transmission sites are periodically monitored only during regular business hours during which simultaneous in-office device clinics are being run. If your transmission requires attention, we will contact you as soon as possible. Thank you.             [facility]

## 2020-10-20 NOTE — PROGRESS NOTES
We received a remote transmission form patient's monitor at home. Remote Linq report shows no arrhythmias. Noted over sensing. EP physician to review. We will continue to monitor remotely.

## 2020-11-23 ENCOUNTER — TELEPHONE (OUTPATIENT)
Dept: CARDIOLOGY CLINIC | Age: 85
End: 2020-11-23

## 2020-11-23 ENCOUNTER — NURSE ONLY (OUTPATIENT)
Dept: CARDIOLOGY CLINIC | Age: 85
End: 2020-11-23
Payer: MEDICARE

## 2020-11-23 PROCEDURE — 93298 REM INTERROG DEV EVAL SCRMS: CPT | Performed by: INTERNAL MEDICINE

## 2020-11-23 PROCEDURE — G2066 INTER DEVC REMOTE 30D: HCPCS | Performed by: INTERNAL MEDICINE

## 2020-11-23 NOTE — Clinical Note
Can you call him and ask him not to use his symptom recorder unless he passes out. He's pressed the button 297 times.

## 2020-11-23 NOTE — LETTER
[address] [facility] [phone]  Luige Raj [address] [facility] [phone]    Pacemaker/Defibrillator Clinic          11/23/20        [address] [place] [phone]        Dear Real Walls    This letter is to inform you that we received the transmission from your monitor at home that checks your implanted heart device. The next date your monitor will automatically transmit will be 12-28-20. If your report needs attention we will notify you. Your device and monitor are wireless and most transmit cellularly, but please periodically check your monitor is still plugged in to the electrical outlet. If you still use the telephone land line to send please ensure the connection to the phone arben is secure. This will help to ensure successful automatic transmissions in the future. Also, the monitor needs to be close to you while sleeping at night. Please be aware that the remote device transmission sites are periodically monitored only during regular business hours during which simultaneous in-office device clinics are being run. If your transmission requires attention, we will contact you as soon as possible. Thank you.             [address]

## 2020-11-23 NOTE — PROGRESS NOTES
We received a remote transmission form patient's monitor at home. Patient has used his symptom recorder 297 x since implant. I suspect he received poor education on when to use symptom recorder. Remote Linq report shows no arrhythmias. Noted over sensing. Will have office staff call pt and educate them on how to use recorder and when to use. EP physician to review. We will continue to monitor remotely.

## 2020-11-23 NOTE — TELEPHONE ENCOUNTER
Pt's wife laughed and said that he does press it all the time. She will relay that pt is only to press the button after passing out.

## 2020-11-23 NOTE — TELEPHONE ENCOUNTER
----- Message from Fausto Teresa sent at 11/23/2020  7:59 AM EST -----  Can you call him and ask him not to use his symptom recorder unless he passes out. He's pressed the button 297 times.

## 2020-12-28 ENCOUNTER — NURSE ONLY (OUTPATIENT)
Dept: CARDIOLOGY CLINIC | Age: 85
End: 2020-12-28
Payer: MEDICARE

## 2020-12-28 PROCEDURE — G2066 INTER DEVC REMOTE 30D: HCPCS | Performed by: INTERNAL MEDICINE

## 2020-12-28 PROCEDURE — 93298 REM INTERROG DEV EVAL SCRMS: CPT | Performed by: INTERNAL MEDICINE

## 2020-12-28 NOTE — LETTER
[address] [phone]  [address] [phone]    Pacemaker/Defibrillator Clinic          12/28/20        [address] [facility] [address] [place] [address] Road [phone]        Dear Cyrus Tolbert    This letter is to inform you that we received the transmission from your monitor at home that checks your implanted heart device. The next date your monitor will automatically transmit will be 2-1-21. If your report needs attention we will notify you. Your device and monitor are wireless and most transmit cellularly, but please periodically check your monitor is still plugged in to the electrical outlet. If you still use the telephone land line to send please ensure the connection to the phone arben is secure. This will help to ensure successful automatic transmissions in the future. Also, the monitor needs to be close to you while sleeping at night. Please be aware that the remote device transmission sites are periodically monitored only during regular business hours during which simultaneous in-office device clinics are being run. If your transmission requires attention, we will contact you as soon as possible. Thank you.             [facility]

## 2020-12-29 ENCOUNTER — HOSPITAL ENCOUNTER (OUTPATIENT)
Dept: WOMENS IMAGING | Age: 85
Discharge: HOME | End: 2020-12-29
Payer: MEDICARE

## 2020-12-29 PROCEDURE — 77080 DXA BONE DENSITY AXIAL: CPT

## 2021-01-11 ENCOUNTER — HOSPITAL ENCOUNTER (OUTPATIENT)
Age: 86
Discharge: HOME | End: 2021-01-11
Payer: MEDICARE

## 2021-01-11 ENCOUNTER — HOSPITAL ENCOUNTER (OUTPATIENT)
Dept: GENERAL RADIOLOGY | Age: 86
Discharge: HOME | End: 2021-01-11
Payer: MEDICARE

## 2021-01-11 DIAGNOSIS — R05.9 COUGH: ICD-10-CM

## 2021-01-11 LAB — PROSTATE SPECIFIC ANTIGEN: <0.01 NG/ML (ref 0–4)

## 2021-01-11 PROCEDURE — 71046 X-RAY EXAM CHEST 2 VIEWS: CPT

## 2021-01-31 PROCEDURE — 93298 REM INTERROG DEV EVAL SCRMS: CPT | Performed by: INTERNAL MEDICINE

## 2021-01-31 PROCEDURE — G2066 INTER DEVC REMOTE 30D: HCPCS | Performed by: INTERNAL MEDICINE

## 2021-02-01 ENCOUNTER — NURSE ONLY (OUTPATIENT)
Dept: CARDIOLOGY CLINIC | Age: 86
End: 2021-02-01
Payer: MEDICARE

## 2021-02-01 DIAGNOSIS — I48.0 PAROXYSMAL ATRIAL FIBRILLATION (HCC): ICD-10-CM

## 2021-02-01 DIAGNOSIS — R55 SYNCOPE AND COLLAPSE: ICD-10-CM

## 2021-02-01 DIAGNOSIS — Z95.818 STATUS POST PLACEMENT OF IMPLANTABLE LOOP RECORDER: ICD-10-CM

## 2021-02-01 DIAGNOSIS — R00.1 SYMPTOMATIC BRADYCARDIA: ICD-10-CM

## 2021-02-01 NOTE — LETTER
[address] [facility] [phone]  [address] [place] [address] [phone]    Pacemaker/Defibrillator Clinic          02/01/21        [address] [place] [phone]        Dear Cecy Alonso    This letter is to inform you that we received the transmission from your monitor at home that checks your implanted heart device. The next date your monitor will automatically transmit will be 3-3-21. If your report needs attention we will notify you. Your device and monitor are wireless and most transmit cellularly, but please periodically check your monitor is still plugged in to the electrical outlet. If you still use the telephone land line to send please ensure the connection to the phone arben is secure. This will help to ensure successful automatic transmissions in the future. Also, the monitor needs to be close to you while sleeping at night. Please be aware that the remote device transmission sites are periodically monitored only during regular business hours during which simultaneous in-office device clinics are being run. If your transmission requires attention, we will contact you as soon as possible. Thank you.             [address]

## 2021-02-01 NOTE — PROGRESS NOTES
We received a remote transmission from patient's monitor at home. Remote Linq report shows no arrhythmias. Symptom recordings show NSR w ectopy. Tachy recordings are not real. Noted under sensing. AF recording is not real. Pt remains on Eliquis. EP physician to review. We will continue to monitor remotely.

## 2021-03-04 ENCOUNTER — NURSE ONLY (OUTPATIENT)
Dept: CARDIOLOGY CLINIC | Age: 86
End: 2021-03-04
Payer: MEDICARE

## 2021-03-04 DIAGNOSIS — I48.0 PAROXYSMAL ATRIAL FIBRILLATION (HCC): ICD-10-CM

## 2021-03-04 DIAGNOSIS — Z95.818 STATUS POST PLACEMENT OF IMPLANTABLE LOOP RECORDER: ICD-10-CM

## 2021-03-04 DIAGNOSIS — R55 SYNCOPE AND COLLAPSE: ICD-10-CM

## 2021-03-04 DIAGNOSIS — R00.1 SYMPTOMATIC BRADYCARDIA: ICD-10-CM

## 2021-03-04 PROCEDURE — 93298 REM INTERROG DEV EVAL SCRMS: CPT | Performed by: INTERNAL MEDICINE

## 2021-03-04 PROCEDURE — G2066 INTER DEVC REMOTE 30D: HCPCS | Performed by: INTERNAL MEDICINE

## 2021-04-06 ENCOUNTER — NURSE ONLY (OUTPATIENT)
Dept: CARDIOLOGY CLINIC | Age: 86
End: 2021-04-06
Payer: MEDICARE

## 2021-04-06 DIAGNOSIS — Z95.818 STATUS POST PLACEMENT OF IMPLANTABLE LOOP RECORDER: ICD-10-CM

## 2021-04-06 DIAGNOSIS — I48.0 PAROXYSMAL ATRIAL FIBRILLATION (HCC): ICD-10-CM

## 2021-04-06 DIAGNOSIS — R55 SYNCOPE AND COLLAPSE: ICD-10-CM

## 2021-04-06 DIAGNOSIS — R00.1 SYMPTOMATIC BRADYCARDIA: ICD-10-CM

## 2021-04-06 PROCEDURE — 93298 REM INTERROG DEV EVAL SCRMS: CPT | Performed by: INTERNAL MEDICINE

## 2021-04-06 PROCEDURE — G2066 INTER DEVC REMOTE 30D: HCPCS | Performed by: INTERNAL MEDICINE

## 2021-04-06 NOTE — PROGRESS NOTES
We received a remote transmission from patient's monitor at home. Remote Linq report shows no arrhythmias. Tachy recordings are not real. Noted over sensing. EP physician to review. We will continue to monitor remotely.

## 2021-04-06 NOTE — LETTER
[address] [facility] [phone]  [address] [phone]    Pacemaker/Defibrillator Clinic          04/06/21        [address] [place] [phone]        Dear Sherry Villar    This letter is to inform you that we received the transmission from your monitor at home that checks your implanted heart device. The next date your monitor will automatically transmit will be 5-10-21. If your report needs attention we will notify you. Your device and monitor are wireless and most transmit cellularly, but please periodically check your monitor is still plugged in to the electrical outlet. If you still use the telephone land line to send please ensure the connection to the phone raben is secure. This will help to ensure successful automatic transmissions in the future. Also, the monitor needs to be close to you while sleeping at night. Please be aware that the remote device transmission sites are periodically monitored only during regular business hours during which simultaneous in-office device clinics are being run. If your transmission requires attention, we will contact you as soon as possible. Thank you.             [address]

## 2021-05-10 ENCOUNTER — NURSE ONLY (OUTPATIENT)
Dept: CARDIOLOGY CLINIC | Age: 86
End: 2021-05-10
Payer: MEDICARE

## 2021-05-10 DIAGNOSIS — R55 SYNCOPE AND COLLAPSE: ICD-10-CM

## 2021-05-10 DIAGNOSIS — Z95.818 STATUS POST PLACEMENT OF IMPLANTABLE LOOP RECORDER: ICD-10-CM

## 2021-05-10 DIAGNOSIS — I48.0 PAROXYSMAL ATRIAL FIBRILLATION (HCC): ICD-10-CM

## 2021-05-10 DIAGNOSIS — R00.1 SYMPTOMATIC BRADYCARDIA: ICD-10-CM

## 2021-05-10 PROCEDURE — 93298 REM INTERROG DEV EVAL SCRMS: CPT | Performed by: INTERNAL MEDICINE

## 2021-05-10 PROCEDURE — G2066 INTER DEVC REMOTE 30D: HCPCS | Performed by: INTERNAL MEDICINE

## 2021-05-10 NOTE — LETTER
[address] [facility] [phone]  [address] [place] [address] [phone]    Pacemaker/Defibrillator Clinic          05/10/21        [address] [place] [phone]        Dear Efrain Devine    This letter is to inform you that we received the transmission from your monitor at home that checks your implanted heart device. The next date your monitor will automatically transmit will be 6-14-21. If your report needs attention we will notify you. Your device and monitor are wireless and most transmit cellularly, but please periodically check your monitor is still plugged in to the electrical outlet. If you still use the telephone land line to send please ensure the connection to the phone arben is secure. This will help to ensure successful automatic transmissions in the future. Also, the monitor needs to be close to you while sleeping at night. Please be aware that the remote device transmission sites are periodically monitored only during regular business hours during which simultaneous in-office device clinics are being run. If your transmission requires attention, we will contact you as soon as possible. Thank you.             [facility]

## 2021-06-14 ENCOUNTER — NURSE ONLY (OUTPATIENT)
Dept: CARDIOLOGY CLINIC | Age: 86
End: 2021-06-14
Payer: MEDICARE

## 2021-06-14 DIAGNOSIS — R55 SYNCOPE AND COLLAPSE: ICD-10-CM

## 2021-06-14 DIAGNOSIS — Z95.818 STATUS POST PLACEMENT OF IMPLANTABLE LOOP RECORDER: ICD-10-CM

## 2021-06-14 DIAGNOSIS — R00.1 SYMPTOMATIC BRADYCARDIA: ICD-10-CM

## 2021-06-14 DIAGNOSIS — I48.0 PAROXYSMAL ATRIAL FIBRILLATION (HCC): ICD-10-CM

## 2021-06-14 PROCEDURE — 93298 REM INTERROG DEV EVAL SCRMS: CPT | Performed by: INTERNAL MEDICINE

## 2021-06-14 PROCEDURE — G2066 INTER DEVC REMOTE 30D: HCPCS | Performed by: INTERNAL MEDICINE

## 2021-06-14 NOTE — LETTER
4[address] [facility] [phone]  [address] [phone]    Pacemaker/Defibrillator Clinic    06/16/21      [address] [place] [phone]      Dear Randell Cantrell    This letter is to inform you that we received the transmission from your monitor at home that checks your implanted heart device. The next date your monitor will automatically transmit will be 7/22. If your report needs attention we will notify you. Your device and monitor are wireless and most transmit cellularly, but please periodically check your monitor is still plugged in to the electrical outlet. If you still use the telephone land line to send please ensure the connection to the phone arben is secure. This will help to ensure successful automatic transmissions in the future. Also, the monitor needs to be close to you while sleeping at night. Please be aware that the remote device transmission sites are periodically monitored only during regular business hours during which simultaneous in-office device clinics are being run. If your transmission requires attention, we will contact you as soon as possible. **PLEASE NOTE** that our [facility] policy and processes are changing to ensure a more seamless approach for all parties involved, allowing more time for our nurses to address patient issues and concerns. We will no longer be sending letters for NORMAL remote transmissions. You will be contacted by phone if your transmission requires attention (as previously done), and letters will only be sent regarding monitor disconnections or missed transmissions if you are unable to be reached by phone. Please do not be alarmed by this new process, as we will continue to contact you if your transmission report requires attention. This will be your final \"remote received\" letter.   From this point forward, the [address]

## 2021-06-15 NOTE — PROGRESS NOTES
We received a remote transmission from patient's monitor at home. Remote Linq report shows no arrhythmias. False tachy recordings illustrate Twave oversensing; undersensing also noted at times. EP physician to review. We will continue to monitor remotely.

## 2021-07-19 ENCOUNTER — OFFICE VISIT (OUTPATIENT)
Dept: ORTHOPEDIC SURGERY | Age: 86
End: 2021-07-19
Payer: MEDICARE

## 2021-07-19 VITALS — WEIGHT: 150 LBS | BODY MASS INDEX: 22.22 KG/M2 | HEIGHT: 69 IN | RESPIRATION RATE: 16 BRPM

## 2021-07-19 DIAGNOSIS — M25.531 RIGHT WRIST PAIN: Primary | ICD-10-CM

## 2021-07-19 PROCEDURE — 99203 OFFICE O/P NEW LOW 30 MIN: CPT | Performed by: PHYSICIAN ASSISTANT

## 2021-07-19 PROCEDURE — 4040F PNEUMOC VAC/ADMIN/RCVD: CPT | Performed by: PHYSICIAN ASSISTANT

## 2021-07-19 PROCEDURE — G8420 CALC BMI NORM PARAMETERS: HCPCS | Performed by: PHYSICIAN ASSISTANT

## 2021-07-19 PROCEDURE — G8428 CUR MEDS NOT DOCUMENT: HCPCS | Performed by: PHYSICIAN ASSISTANT

## 2021-07-19 PROCEDURE — 1036F TOBACCO NON-USER: CPT | Performed by: PHYSICIAN ASSISTANT

## 2021-07-19 PROCEDURE — 1123F ACP DISCUSS/DSCN MKR DOCD: CPT | Performed by: PHYSICIAN ASSISTANT

## 2021-07-19 NOTE — PATIENT INSTRUCTIONS
Thank you for choosing [facility]) Physicians for your Hand and Upper Extremity needs. If we can be of any further assistance to you, please do not hesitate to contact us.     Office Phone Number:  [phone]  or  [phone]'

## 2021-07-22 ENCOUNTER — NURSE ONLY (OUTPATIENT)
Dept: CARDIOLOGY CLINIC | Age: 86
End: 2021-07-22
Payer: MEDICARE

## 2021-07-22 DIAGNOSIS — R55 SYNCOPE AND COLLAPSE: ICD-10-CM

## 2021-07-22 DIAGNOSIS — Z45.09 ENCOUNTER FOR LOOP RECORDER CHECK: ICD-10-CM

## 2021-07-22 PROCEDURE — 93298 REM INTERROG DEV EVAL SCRMS: CPT | Performed by: INTERNAL MEDICINE

## 2021-07-22 PROCEDURE — G2066 INTER DEVC REMOTE 30D: HCPCS | Performed by: INTERNAL MEDICINE

## 2021-07-22 NOTE — PROGRESS NOTES
We received a remote transmission from patient's monitor at home. Remote Linq report shows no arrhythmias. Symptom event noted, likely NSR w Twave oversensing based on plot (no ECG available). False tachy recordings illustrate Twave oversensing; undersensing also noted at times. EP physician to review. We will continue to monitor remotely.

## 2021-08-13 LAB
A/G RATIO: 1.6 (ref 1.1–2.2)
ALBUMIN SERPL-MCNC: 4.4 G/DL (ref 3.4–5)
ALP BLD-CCNC: 85 U/L (ref 40–129)
ALT SERPL-CCNC: 11 U/L (ref 10–40)
ANION GAP SERPL CALCULATED.3IONS-SCNC: 10 MMOL/L (ref 3–16)
AST SERPL-CCNC: 22 U/L (ref 15–37)
BILIRUB SERPL-MCNC: <0.2 MG/DL (ref 0–1)
BUN BLDV-MCNC: 24 MG/DL (ref 7–20)
CALCIUM SERPL-MCNC: 9.7 MG/DL (ref 8.3–10.6)
CHLORIDE BLD-SCNC: 99 MMOL/L (ref 99–110)
CHOLESTEROL, TOTAL: 233 MG/DL (ref 0–199)
CO2: 27 MMOL/L (ref 21–32)
CREAT SERPL-MCNC: 1.3 MG/DL (ref 0.8–1.3)
GFR AFRICAN AMERICAN: >60
GFR NON-AFRICAN AMERICAN: 51
GLOBULIN: 2.7 G/DL
GLUCOSE BLD-MCNC: 103 MG/DL (ref 70–99)
HCT VFR BLD CALC: 32.6 % (ref 40.5–52.5)
HDLC SERPL-MCNC: 82 MG/DL (ref 40–60)
HEMOGLOBIN: 10.8 G/DL (ref 13.5–17.5)
LDL CHOLESTEROL CALCULATED: 137 MG/DL
MCH RBC QN AUTO: 31.8 PG (ref 26–34)
MCHC RBC AUTO-ENTMCNC: 33.1 G/DL (ref 31–36)
MCV RBC AUTO: 96.1 FL (ref 80–100)
PDW BLD-RTO: 15.6 % (ref 12.4–15.4)
PLATELET # BLD: 204 K/UL (ref 135–450)
PMV BLD AUTO: 9.1 FL (ref 5–10.5)
POTASSIUM SERPL-SCNC: 4.9 MMOL/L (ref 3.5–5.1)
RBC # BLD: 3.39 M/UL (ref 4.2–5.9)
SODIUM BLD-SCNC: 136 MMOL/L (ref 136–145)
TOTAL PROTEIN: 7.1 G/DL (ref 6.4–8.2)
TRIGL SERPL-MCNC: 72 MG/DL (ref 0–150)
TSH SERPL DL<=0.05 MIU/L-ACNC: 1.73 UIU/ML (ref 0.27–4.2)
VLDLC SERPL CALC-MCNC: 14 MG/DL
WBC # BLD: 5.4 K/UL (ref 4–11)

## 2021-08-26 ENCOUNTER — NURSE ONLY (OUTPATIENT)
Dept: CARDIOLOGY CLINIC | Age: 86
End: 2021-08-26
Payer: MEDICARE

## 2021-08-26 DIAGNOSIS — R55 SYNCOPE AND COLLAPSE: ICD-10-CM

## 2021-08-26 DIAGNOSIS — Z95.818 STATUS POST PLACEMENT OF IMPLANTABLE LOOP RECORDER: ICD-10-CM

## 2021-08-26 PROCEDURE — 93298 REM INTERROG DEV EVAL SCRMS: CPT | Performed by: INTERNAL MEDICINE

## 2021-08-26 PROCEDURE — G2066 INTER DEVC REMOTE 30D: HCPCS | Performed by: INTERNAL MEDICINE

## 2021-08-26 NOTE — PROGRESS NOTES
We received a remote transmission from patient's monitor at home. Remote Linq report shows no arrhythmias. False tachy recordings illustrate Twave/artifact oversensing; undersensing also noted at times. EP physician to review. We will continue to monitor remotely.

## 2021-08-30 NOTE — PROGRESS NOTES
[address]  Cardiology Progress Note      Sanya Gaona  8/8/1926, 80 y.o. Chief Complaint   Patient presents with    1 Year Follow Up     No CC     Tomas Maxwell MD:    HPI:   This is a 80 y.o. male with a PMH significant for HTN, HLD, Paroxysmal AFIB and Prostate CA. His wife is a longtime patient of mine. He is here today as a new patient for evaluation of syncope and bradycardia. He presented to [facility] on 7/6/18 after a syncopal episode. He was consulted by Dr. Gabriela Perez who thought this to be vasovagal or orthostatic. Also has hx of LVOT gradient. He typically follows with cardiology at [facility], Dr. Taylor Moody, but his wife wanted him to be seen by me. During office visit on 3/26/19 patient reported passing out after urinating. He says he became lightheaded upon standing, loss consciousness and fell. His wife called EMS and HR was in the 30's. HR recovered to the 60's and BB was stopped. Patient presented to [facility] ED 3/27/20 after a syncopal episode. He had just gotten up from bed to proceed with the bathroom and fell out and hit his head. He has worn a 30 day event monitor (3/2019) and a 14 day CAM patch (4/2020). Neither of which detected Afib/flutter. He had no syncopal episodes while wearing monitors. ILR implanted for recurrent syncope on 9/9/20 by Dr. Marely Uribe. Returns today in follow up for management of above issues. He is accompanied by his wife today. He has no new cardiac complaints. He has been well. Taking all medication and tolerating. No recurrent syncope.          Past Medical History:   Diagnosis Date    Cancer [facility])     Prostate    Hemorrhoids     Hyperlipidemia     Hypertension     Influenza A 12/29/2014    Shortness of breath       Past Surgical History:   Procedure Laterality Date    CARPAL TUNNEL RELEASE Right 09/01/15    Right carpal tunnel release      CARPAL TUNNEL RELEASE Left 9/22/15    COLONOSCOPY N/A 4/8/2019    COLONOSCOPY DIAGNOSTIC performed by Annemarie Kyle MD at [address]      right wrist    ENDOSCOPY, COLON, DIAGNOSTIC      HERNIA REPAIR Right     inguinal    UPPER GASTROINTESTINAL ENDOSCOPY      with dilatation      Family History   Problem Relation Age of Onset    Hypertension Other     Stroke Other       Social History     Tobacco Use    Smoking status: Former Smoker     Quit date: 1955     Years since quittin.0    Smokeless tobacco: Never Used   Vaping Use    Vaping Use: Never used   Substance Use Topics    Alcohol use: No    Drug use: No     No Known Allergies      Review of Systems -   Constitutional: Negative for weight gain/loss; malaise, fever  Respiratory: Negative for Asthma;  cough and hemoptysis  Cardiovascular: Negative for palpitations,dizziness   Gastrointestinal: Negative for abd.pain; constipation/diarrhea;    Genitourinary: Negative for stones; hematuria; frequency hesitancy  Integumentt: Negative for rash or pruritis  Hematologic/lymphatic: Negative for blood dyscrasia; leukemia/lymphoma  Musculoskeletal: Negative for Connective tissue disease  Neurological:  Negative for Seizure   Behavioral/Psych:Negative for Bipolar disorder, Schizophrenia; Dementia  Endocrine: negative for thyroid, parathyroid disease    Physical Examination:    /70   Pulse 72   Ht 5' 9\" (1.753 m)   Wt 149 lb 6.4 oz (67.8 kg)   SpO2 99%   BMI 22.06 kg/m²    HEENT:  Face: Atraumatic, Conjunctiva: Pink; non icteric,  Mucous Memb:  Moist, No thyromegaly or Lymphadenopathy  Respiratory:  Resp Assessment: WNL, Resp Auscultation: Normal  Cardiovascular: Auscultation: nl S1 & S2, Palpation:  Nl PMI;  No heaves or thrills, JVP:  normal  Abdomen: Soft, non-tender, Normal bowel sounds,  No organomegaly  Extremities: No Cyanosis or Clubbing  Neurological: Oriented to time, place, and person, Non-anxious  Psychiatric: Normal mood and affect  Skin: Warm and dry,  No rash seen  /70   Pulse 72   Ht 5' 9\" (1.753 m)   Wt 149 lb 6.4 oz (67.8 kg)   SpO2 99%   BMI 22.06 kg/m²        Current Outpatient Medications   Medication Sig Dispense Refill    apixaban (ELIQUIS) 2.5 MG TABS tablet TAKE ONE TABLET BY MOUTH TWICE A DAY 60 tablet 1    Misc. Devices (WALKER) MISC 1 each by Does not apply route daily Dispense and Fit for injury to lower extremity and weakness. 1 each 0    Dietary Management Product (SENTRA AM PO) Take by mouth daily Indications: For men      leuprolide acetate, 6 Month, (ELIGARD) 45 MG injection Inject 45 mg into the skin every 6 months      albuterol sulfate  (90 Base) MCG/ACT inhaler Inhale 2 puffs into the lungs 4 times daily as needed for Wheezing 3 Inhaler 1    Multiple Minerals-Vitamins (PROSTEON PO) Take 2 tablets by mouth 2 times daily        No current facility-administered medications for this visit. Labs:   Lab Results   Component Value Date    HDL 82 08/12/2021    HDL 69 03/05/2012    LDLCALC 137 08/12/2021    TRIG 72 08/12/2021       EKG:   10/1/19: SR  9/2/21 sinus rhythm, first degree block       EP:   Device information:   The device is a [employer] Reveal LINQ [id number] SN# [id number] with implant date: 9/9/2020  The device was programmed to detect:   VT: 380 ms 16 beats   Bradycardia: 2000 ms     CT of head: 1/18/19  FINDINGS:       No acute intracranial hemorrhage. No extra-axial fluid collections. Hyperdensity along the tentorium and falx cerebri is likely related to a calcifications. Moderate amount of periventricular and subcortical white matter hypoattenuation. The ventricles and extra-axial spaces are normal in size and configuration.       Prior bilateral cataract replacement. Senescent calcifications are present in the globes. Mild opacification the maxillary sinuses. No abnormality of the calvarium is identified.         1. No acute stroke, mass, or hemorrhage. 2. Moderate chronic small vessel ischemic white matter disease.        CT of head: 7/6/18  No acute intracranial abnormality.       Stable pattern of atrophy and microvascular ischemic disease.            ECHO:7/6/18  Left ventricular cavity size is normal. There is moderate asymmetric  hypertrophy of the basal septum. Overall left ventricular systolic function appears hyperdynamic. Ejection fraction is visually estimated to be >70%. No regional wall motion abnormalities are noted. There is a late peaking gradient recorded across the LV outflow tract with a peak gradient of  52mmHg. with Valsalva maneuver. Mitral annular calcification is present. Mitral valve leaflets appear mildly thickened. systolic ant motion of anterior mitral leaflet. Moderate mitral regurgitation. Aortic valve appears sclerotic but appears to open adequately. Mild tricuspid regurgitation. Stress Nuclear: 7/31/17  Summary    Normal myocardial perfusion study.    Normal LV size and systolic function.    Uncontrolled hypertension.    Overall findings represent a low risk study.         Peak HR:86 bpm                                  HR/BP product:56334    Peak BP:190/68 mmHg    Predicted HR: 130 bpm    % of predicted HR: 66    Test duration: 1 min and 40 sec    Reason for termination:Physiologic Maximum        ECG Findings    No ischemic EKG changes.    Nondiagnostic due to failure to reach target.        Arrhythmias    None     Holter Monitor: 7/25/16  DAY 1  1. Rhythm was sinus with episodes of PSVT. 2. There was occasional SVE. 3. There was rare VE, including one couplet. 4. Patient marker correlated with sinus rhythm. 5. No diary. SV [place]: 8.14 seconds (0.01%); 15 beats (0.01%)  DAY 2  1. Rhythm was sinus with episodes of PSVT and Afib. 2. There was rare SVE. 3. There was rare VE. 4. Patient marker was not used. 5. No diary. SV [place]: 7.40 seconds (0.01%); 14 beats (0.01%)  AF. [place]: 5.43 seconds (0.01%); 10 beats (0.01%)    Short runs of atrial fibrillation.  Rare PVCs    30 day event monitor 3/2019: Scanned to media     CAM patch: 4/2-4/17/20  NSR with 1st degree AVB  6 episodes of atrial tachycardia  PAC/PVCs   No bradycardia, Afib or Aflutter present    Corornary angiogram  & Intervention: none on file        ASSESSMENT AND PLAN:    ILR / Syncope   ER visit on 1/18/19 for syncopal episode  Event monitor 3/2019 showed normal HR   14 day monitor 4/2020 showed no Afib/flutter. No syncope reported and no bradycardia. Implanted on 9/9/20  Device interrogated 8/26/21 and reviewed by EP  Denies any recurrent syncope     Paroxysmal Atrial Fibrillation  No anti-arrhythmic therapy due to risk of worsening bradycardia. [id number] Score 3 (HTN, AGE); Eliquis 2.5 mg BID from thromboembolic risk reduction. Mixed Hyperlipidemia   Patient no longer on statin dur to advanced age   Reviewed lipid panel completed on 8/13/21    Dynamic outflow tract obstruction  Has LVOT gradient suggestive of outflow tract obstruction with moderate mitral regurgitation  This may be a contributing factor to his episodic shortness of breath. Essential hypertension  Well controlled. Continue current management. Official loop recorder recordings suggest heart rates of 194 and many other episodes of tachycardia however I think it is incorrect interpretation by the computer. Has been looked at by  as well. Patient's had no spells of dizziness presyncope or syncope      Follow up yearly or sooner if needed       Thank you very much for allowing me to participate in the care of your patient. Please do not hesitate to contact me if you have any questions.     Sincerely,    Kevin Hernandez M.D  [facility], [address] [place] [address]  Ph: [phone]  Fax: [phone]

## 2021-09-02 ENCOUNTER — OFFICE VISIT (OUTPATIENT)
Dept: CARDIOLOGY CLINIC | Age: 86
End: 2021-09-02
Payer: MEDICARE

## 2021-09-02 VITALS
BODY MASS INDEX: 22.13 KG/M2 | HEIGHT: 69 IN | WEIGHT: 149.4 LBS | HEART RATE: 72 BPM | SYSTOLIC BLOOD PRESSURE: 128 MMHG | DIASTOLIC BLOOD PRESSURE: 70 MMHG | OXYGEN SATURATION: 99 %

## 2021-09-02 DIAGNOSIS — E78.2 MIXED HYPERLIPIDEMIA: ICD-10-CM

## 2021-09-02 DIAGNOSIS — I48.0 PAF (PAROXYSMAL ATRIAL FIBRILLATION) (HCC): ICD-10-CM

## 2021-09-02 DIAGNOSIS — I10 ESSENTIAL (PRIMARY) HYPERTENSION: ICD-10-CM

## 2021-09-02 DIAGNOSIS — Z98.890 HISTORY OF LOOP RECORDER: ICD-10-CM

## 2021-09-02 DIAGNOSIS — R55 VASOVAGAL SYNCOPE: Primary | ICD-10-CM

## 2021-09-02 PROCEDURE — 93000 ELECTROCARDIOGRAM COMPLETE: CPT | Performed by: INTERNAL MEDICINE

## 2021-09-02 PROCEDURE — 4040F PNEUMOC VAC/ADMIN/RCVD: CPT | Performed by: INTERNAL MEDICINE

## 2021-09-02 PROCEDURE — 1036F TOBACCO NON-USER: CPT | Performed by: INTERNAL MEDICINE

## 2021-09-02 PROCEDURE — G8427 DOCREV CUR MEDS BY ELIG CLIN: HCPCS | Performed by: INTERNAL MEDICINE

## 2021-09-02 PROCEDURE — 1123F ACP DISCUSS/DSCN MKR DOCD: CPT | Performed by: INTERNAL MEDICINE

## 2021-09-02 PROCEDURE — 99213 OFFICE O/P EST LOW 20 MIN: CPT | Performed by: INTERNAL MEDICINE

## 2021-09-02 PROCEDURE — G8420 CALC BMI NORM PARAMETERS: HCPCS | Performed by: INTERNAL MEDICINE

## 2021-09-20 NOTE — TELEPHONE ENCOUNTER
Last OV: 9/2/21  Next OV: 1 YR F/U 9/2022  Last refill:7/20/21  Most recent Labs: 8/12/21  Last EKG (if needed):9/2/21

## 2021-09-21 ENCOUNTER — NURSE ONLY (OUTPATIENT)
Dept: CARDIOLOGY CLINIC | Age: 86
End: 2021-09-21

## 2021-09-21 NOTE — PROGRESS NOTES
ILR for syncope. [address] [facility] [address] transmission received 09.21.21 @ 01:05 for Pause episode detected 09.20.21 @ 05:33. We received a remote transmission from patient's monitor at home. Remote Linq report shows pause episode detected 09.20.21 @ 05:33 (likely nocturnal), 4sec duration, w ECG illustrating true pause d/t intermittent heart block (Pwaves visible). Noted hx false tachy recordings d/t Twave/artifact oversensing. EP physician to review. We will continue to monitor remotely.

## 2021-09-21 NOTE — RESULT ENCOUNTER NOTE
Device interrogation reviewed. Pause detected at 0500, possibly during sleep. Please solicit from patient whether sleeping or symptomatic. If the former, he needs to see me in clinic.

## 2021-09-27 ENCOUNTER — NURSE ONLY (OUTPATIENT)
Dept: CARDIOLOGY CLINIC | Age: 86
End: 2021-09-27

## 2021-09-27 ENCOUNTER — OFFICE VISIT (OUTPATIENT)
Dept: CARDIOLOGY CLINIC | Age: 86
End: 2021-09-27
Payer: MEDICARE

## 2021-09-27 VITALS
OXYGEN SATURATION: 96 % | WEIGHT: 150 LBS | HEART RATE: 80 BPM | SYSTOLIC BLOOD PRESSURE: 130 MMHG | DIASTOLIC BLOOD PRESSURE: 70 MMHG | BODY MASS INDEX: 22.22 KG/M2 | HEIGHT: 69 IN

## 2021-09-27 DIAGNOSIS — R00.1 SYMPTOMATIC BRADYCARDIA: ICD-10-CM

## 2021-09-27 DIAGNOSIS — I48.0 PAF (PAROXYSMAL ATRIAL FIBRILLATION) (HCC): ICD-10-CM

## 2021-09-27 DIAGNOSIS — Z95.818 STATUS POST PLACEMENT OF IMPLANTABLE LOOP RECORDER: ICD-10-CM

## 2021-09-27 DIAGNOSIS — R55 SYNCOPE AND COLLAPSE: ICD-10-CM

## 2021-09-27 DIAGNOSIS — I46.9 VENTRICULAR ASYSTOLIA (HCC): Primary | ICD-10-CM

## 2021-09-27 DIAGNOSIS — I48.0 PAROXYSMAL ATRIAL FIBRILLATION (HCC): ICD-10-CM

## 2021-09-27 PROCEDURE — 1036F TOBACCO NON-USER: CPT | Performed by: INTERNAL MEDICINE

## 2021-09-27 PROCEDURE — 99215 OFFICE O/P EST HI 40 MIN: CPT | Performed by: INTERNAL MEDICINE

## 2021-09-27 PROCEDURE — 4040F PNEUMOC VAC/ADMIN/RCVD: CPT | Performed by: INTERNAL MEDICINE

## 2021-09-27 PROCEDURE — G8427 DOCREV CUR MEDS BY ELIG CLIN: HCPCS | Performed by: INTERNAL MEDICINE

## 2021-09-27 PROCEDURE — G8420 CALC BMI NORM PARAMETERS: HCPCS | Performed by: INTERNAL MEDICINE

## 2021-09-27 PROCEDURE — 1123F ACP DISCUSS/DSCN MKR DOCD: CPT | Performed by: INTERNAL MEDICINE

## 2021-09-27 NOTE — PROGRESS NOTES
Pt seen in clinic today for annusl cardiac device interrogation   Their device is a MDT ILR    the device appears to be functioning normally and as programmed  Remaining battery life is \"OK\"  Multiple \"tachy\" episodes since last remote check (9/21/2020) all appear to be double counting due to long P-R interval.     Rx:  apixaban (ELIQUIS) 2.5 MG TABS tablet TAKE ONE TABLET BY MOUTH TWICE A DAY           Pt was informed of findings today and general questions have been answered with regard to device. Home monitoring hardware has yet to be acquired.

## 2021-09-27 NOTE — PROGRESS NOTES
Cardiac Electrophysiology Consultation   Date: 9/27/2021  Reason for Consultation: syncope, sinus pause  Consult Requesting Physician:Víctor Mcelroy MD  Primary Care Physician: Earl Harris MD    Chief Complaint:   Chief Complaint   Patient presents with    Follow-up     afib - device check- SOB        HPI: Roxanne Quintana is a 80 y.o. patient with a history of atrial fibrillation, syncope, prostate cancer and hypertension. Interval History: Today, he presents to office accompanied by his wife for follow up on sinus pause seen on 9/20/2021at 530 am on his ILR interrogation. Patient stated the he was up using the bathroom and felt lightheaded and had to sit down. Patient denied having a syncopal episode at that time. His wife recalls a history of vagovagal syncope which occurred after using the bathroom. She states the he had been instructed to sit for a minute after using the bathroom to help prevent syncope and he has been doing this and it has prevented him from falling with his recent episodes. His wife states the majority of his episodes have occurred in the early morning hours when getting up to urinate. He states he get up three to four times a night to urinate. [facility] He is compliant with his medications and tolerating them well. He denies chest pain/pressure, tightness, edema, shortness of breath, heart racing, palpitations, PND or orthopnea.      Past Medical History:   Diagnosis Date    Cancer [facility])     Prostate    Hemorrhoids     Hyperlipidemia     Hypertension     Influenza A 12/29/2014    Shortness of breath         Past Surgical History:   Procedure Laterality Date    CARPAL TUNNEL RELEASE Right 09/01/15    Right carpal tunnel release      CARPAL TUNNEL RELEASE Left 9/22/15    COLONOSCOPY N/A 4/8/2019    COLONOSCOPY DIAGNOSTIC performed by Izzy Wheeler MD at [id number]      right wrist    ENDOSCOPY, COLON, DIAGNOSTIC      HERNIA REPAIR Right 1946 inguinal    UPPER GASTROINTESTINAL ENDOSCOPY      with dilatation       Allergies:  No Known Allergies    Medication:   Prior to Admission medications    Medication Sig Start Date End Date Taking? Authorizing Provider   apixaban (ELIQUIS) 2.5 MG TABS tablet TAKE ONE TABLET BY MOUTH TWICE A DAY 9/20/21  Yes Memo Gardiner MD   Misc. Devices (WALKER) MISC 1 each by Does not apply route daily Dispense and Fit for injury to lower extremity and weakness. 3/27/20  Yes Tasneem Adair PA-C   Dietary Management Product (SENTRA AM PO) Take by mouth daily Indications: For men   Yes Historical Provider, MD   leuprolide acetate, 6 Month, (ELIGARD) 45 MG injection Inject 45 mg into the skin every 6 months   Yes Historical Provider, MD   albuterol sulfate  (90 Base) MCG/ACT inhaler Inhale 2 puffs into the lungs 4 times daily as needed for Wheezing 8/13/19  Yes BERNARDO Penn MD   Multiple Minerals-Vitamins (PROSTEON PO) Take 2 tablets by mouth 2 times daily    Yes Historical Provider, MD       Social History:   reports that he quit smoking about 66 years ago. He has never used smokeless tobacco. He reports that he does not drink alcohol and does not use drugs. Family History:  family history includes Hypertension in an other family member; Stroke in an other family member. Reviewed.  Denies family history of sudden cardiac death, arrhythmia, premature CAD    Review of System:    · General ROS: negative for - chills, fever   · Psychological ROS: negative for - anxiety or depression  · Ophthalmic ROS: negative for - eye pain or loss of vision  · ENT ROS: negative for - epistaxis, headaches, nasal discharge, sore throat   · Allergy and Immunology ROS: negative for - hives, nasal congestion   · Hematological and Lymphatic ROS: negative for - bleeding problems, blood clots, bruising or jaundice  · Endocrine ROS: negative for - skin changes, temperature intolerance or unexpected weight changes  · Respiratory ROS: negative for - cough, hemoptysis, pleuritic pain, SOB, sputum changes or wheezing  · Cardiovascular ROS: Per HPI. · Gastrointestinal ROS: negative for - abdominal pain, blood in stools, diarrhea, hematemesis, melena, nausea/vomiting or swallowing difficulty/pain  · Genito-Urinary ROS: negative for - dysuria or incontinence  · Musculoskeletal ROS: negative for - joint swelling or muscle pain  · Neurological ROS: negative for - confusion, dizziness, gait disturbance, headaches, numbness/tingling, seizures, speech problems, tremors, visual changes or weakness  · Dermatological ROS: negative for - rash    Physical Examination:  Vitals:    21 1314   BP: 130/70   Pulse: 80   SpO2: 96%       · Constitutional: Oriented. No distress. · Head: Normocephalic and atraumatic. · Mouth/Throat: Oropharynx is clear and moist.   · Eyes: Conjunctivae normal. EOM are normal.   · Neck: Normal range of motion. Neck supple. No rigidity. No JVD present. · Cardiovascular: Normal rate, regular rhythm, S1&S2 and intact distal pulses. · Pulmonary/Chest: Bilateral respiratory sounds. No wheezes. No rhonchi. · Abdominal: Soft. Bowel sounds present. No distension, No tenderness. · Musculoskeletal: No tenderness. No edema    · Lymphadenopathy: Has no cervical adenopathy. · Neurological: Alert and oriented. Cranial nerve appears intact, No Gross deficit   · Skin: Skin is warm and dry. No rash noted. · Psychiatric: Has a normal mood, affect and behavior     Labs:  Reviewed. EC2021 sinus rhythm, frst degree A-V block with v-rate of 72 bpm with QRS duration 88 ms. No pathologic Q waves, ventricular pre-excitation, or QT prolongation. Studies:   1. CAM patch: -20  NSR with 1st degree AVB  6 episodes of atrial tachycardia  PAC/PVCs   No bradycardia, Afib or Aflutter present    2. Echo: 2021  Left ventricular cavity size is normal. There is moderate asymmetric  hypertrophy of the basal septum. Overall left ventricular systolic function appears hyperdynamic. Ejection fraction is visually estimated to be >70%. No regional wall motion abnormalities are noted. There is a late peak in  gradient recorded across the LV outflow tract with a peak gradient of  52mmHg. with Valsalva maneuver. Mitral annular calcification is present. Mitral valve leaflets appear mildly thickened. Systolic ant motion of anterior mitral leaflet. Moderate mitral regurgitation. Aortic valve appears sclerotic but appears to open adequately. Mild tricuspid regurgitation. 3. Stress Test:  n/a      4. Cath: n/a    I independently reviewed and interpreted the ECG, MCOT, echocardiogram, stress test, and coronary angiography/PCI results and used them for my plan of care. Procedures:  1. 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 Symptomatic with lightheadedness. The risks and benefits of a PPM implantation were discussed at length with the patient. Much time was spent with the patient in a shared-decision making approach, discussing the pathophysiology of the patient's diagnosis of ventricular asystolia,  the risk of presyncope or syncope, the utility of a PPM, the benefit and risks of the implantation of a PPM, the benefits and risks of living with a PPM, the lifestyle changes that come along with having a PPM, and the logistics of a PPM care and generator changes that go along with having a PPM.    The risks including, but not limited to, the risks of vascular injury, bleeding, infection, device malfunction, lead dislodgement, radiation exposure, injury to cardiac and surrounding structures (including pneumothorax), stroke, myocardial infarction and death were discussed in detail. The patient was also counseled at length about the risks of lana Covid-19 in the michell-operative and post-operative states including the recovery window of their procedure.  The patient was made aware that lana Covid-19 after a surgical procedure may worsen their prognosis for recovering from the virus and lend to a higher morbidity and or mortality risk. The patient was given the option of postponing their procedure. The patient was also presented reasonable alternatives to the proposed care, treatment, and services. The discussion I have had with the patient encompassed risks, benefits, and side effects related to the alternatives and the risks related to not receiving the proposed care, treatment and services. All of the topics above were covered with the patient. A literature packet from our office was also given to the patient to provide supplementary information for the patient as it pertains to PPM implantation, lifestyle changes, and the benefits and risks of undergoing the implantation and living with a PPM.     I spent 40 minutes face to face with the patient, with greater than 50% of that time spent in counseling on the above. The patient meets a Class I indication for a PPM implantation for the diagnosis of ventricular asystolia . The patient has opted to proceed with the PPM implantation, and we will proceed with scheduling a [employer] 2-chamber PPM implantation for symptomatic ventricular asystolia. We will explant his ILR prior to implanting the PPM.  We will hold his Eliquis 3 day prior to his procedure. Paroxysmal atrial fibrillation  -He has a [id number] Score 3 (HTN, AGE)  -On Eliquis 2.5 mg BID for  thromboembolic risk reduction.  -Tolerating well no signs or symptoms of abnormal bruising or bleeding.  -He is not a candidate for an ablation due to his advance age. Follow up 1 week for wound check and 3 months with Leesa Stevenson CNP after PPM implantation. Thank you for allowing me to participate in the care of Sahra Andrew. All questions and concerns were addressed to the patient/family. Alternatives to my treatment were discussed.

## 2021-09-27 NOTE — PATIENT INSTRUCTIONS
If you have any question regarding your procedure please contact Dr. Savanna Riley Nurse Van Hernandez at [phone]. Pacemaker implantation Pre procedure Instructions    Date: Friday 10-8-2021    Arrive at: 1:00 pm    Procedure time: 2:30 pm    The morning of your procedure you will park in the hospital parking lot and report directly to the cath lab to check in. At the information desk stay right and go all the way to the end of the seymour, this will take you directly to your check in desk for the cath lab. Pre-Procedure Instructions   1. You will need to fast (nothing to eat or drink) for at least 8 hours prior to procedure. 2. You will need to hold your Eliquis for 3 days prior to the procedure. .  3. Do not use any lotions, creams or perfume the morning of procedure. 4. You will need to complete pre-procedure lab work 5-7 days prior to your procedure. 5. If you have received your COVID vaccination please bring documentation with you the day of your procedure. 6. Please have a responsible adult to drive you home after procedure, you should go home same day, but there is always a possibility of an overnight stay. 7. Cath lab will provide you with all post procedure instructions  8. A 3 month follow up will be scheduled with Dr. Savanna Riley Nurse practitioner Jo Nieto CNP post procedure      ________________________________________________________________________________________________    Patient Education        Learning About a Pacemaker  What is a pacemaker? A pacemaker is a small device. It sends out mild electrical signals that keep your heart beating normally. The signals are painless. It can help stop the dizziness, fainting, and shortness of breath caused by a slow or unsteady heartbeat. A pacemaker is powered by batteries. Most pacemakers are placed under the skin of your chest. They have thin wires, called leads. The leads pass through a vein into your heart.   A pacemaker can help restore a normal heart rate. It is used when certain problems have damaged the heart's electrical system, which normally keeps your heart beating steadily. You may feel worried about having a pacemaker. This is common. It can help if you learn about how the pacemaker helps your heart. Talk to your doctor about your concerns. How is a pacemaker put in place? You will get medicine before the procedure. It helps you relax and helps prevent pain. The doctor makes a cut in the skin just below your collarbone. The cut may be on either side of your chest. The doctor will put the pacemaker leads through the cut. The leads go into a large blood vessel in the upper chest. Then the doctor will guide the leads through the blood vessel into the heart. The leads are placed in one or two of the chambers in the heart. The doctor will place the pacemaker under the skin of your chest. The doctor will attach the leads to the pacemaker. Then the cut will be closed with stitches. What can you expect when you have a pacemaker? A pacemaker can help you return to a more normal, more active life. You'll need to use certain electric devices with caution. Some devices have a strong electromagnetic field. This field can keep your pacemaker from working right for a short time. These devices include things in your home, garage, or workplace. Check with your doctor about what you need to avoid and what you need to keep a short distance away from your pacemaker. Many household and office electronics do not affect your pacemaker. Your doctor will check your pacemaker regularly to make sure it is working right. Pacemaker batteries usually last 5 to 15 years before they need to be replaced. Follow-up care is a key part of your treatment and safety. Be sure to make and go to all appointments, and call your doctor if you are having problems. It's also a good idea to know your test results and keep a list of the medicines you take.   Where can you learn more?  Go to https://chpepiceweb.Glider.io. org and sign in to your [employer] account. Enter I086 in the [facility] box to learn more about \"Learning About a Pacemaker. \"     If you do not have an account, please click on the \"Sign Up Now\" link. Current as of: April 29, 2021               Content Version: 13.0  © 2006-2021 [employer]. Care instructions adapted under license by [facility] ([facility]). If you have questions about a medical condition or this instruction, always ask your healthcare professional. [place] [address] any warranty or liability for your use of this information. Patient Education        Pacemaker Placement: Before Your Procedure  What is pacemaker placement? A pacemaker is a small, battery-powered device. It sends electrical signals to the heart. This keeps the heartbeat steady when you have bradycardia (a slow heart rate). Thin wires, called leads, carry the signals between the pacemaker and the heart. This device is also called a pacer. You will get medicine before the procedure. This helps you relax and helps prevent pain. The doctor will make a cut in the skin just below your collarbone. The cut may be on either side of your chest. The doctor will put the pacemaker leads through the cut. The leads go into a large blood vessel in the upper chest. Then the doctor will guide the leads through the blood vessel into the heart. The doctor will place the pacemaker under the skin of your chest. He or she will attach the leads to the pacemaker. Then the cut will be closed with stitches. The procedure usually takes about an hour. You may need to spend the night in the hospital.  Pacemaker batteries usually last 5 to 15 years. Your doctor will talk to you about how often you will need to have your pacemaker and battery checked. You can likely return to many of your normal activities after your procedure.  But to stay safe, you may need to make some changes in your normal routine. You may feel worried about having a pacemaker. This is common. You might feel better if you learn techniques to help you relax. And it can help if you learn about how the pacemaker helps your heart. Talk to your doctor about your questions and concerns. How do you prepare for the procedure? Procedures can be stressful. This information will help you understand what you can expect. And it will help you safely prepare for your procedure. Preparing for the procedure    · Be sure you have someone to take you home. Anesthesia and pain medicine will make it unsafe for you to drive or get home on your own.     · Understand exactly what procedure is planned, along with the risks, benefits, and other options.     · If you take aspirin or some other blood thinner, be sure to talk to your doctor. Find out if you should stop taking it before your procedure. Make sure that you understand exactly what your doctor wants you to do.     · Tell your doctor ALL the medicines, vitamins, supplements, and herbal remedies you take. Some may increase the risk of problems during your procedure. Your doctor will tell you if you should stop taking any of them before the procedure and how soon to do it.     · Make sure your doctor and the hospital have a copy of your advance directive. If you don't have one, you may want to prepare one. It lets others know your health care wishes. It's a good thing to have before any type of surgery or procedure. What happens on the day of the procedure?    · Follow the instructions exactly about when to stop eating and drinking. If you don't, your procedure may be canceled. If your doctor told you to take your medicines on the day of the procedure, take them with only a sip of water.     · Follow your doctor's instructions about when to bathe or shower before your procedure.  Do not apply lotions, perfumes, deodorants, or nail polish.     · Take off all jewelry and piercings. And take out contact lenses, if you wear them. At the hospital or surgery center    · Bring a picture ID.     · You will be kept comfortable and safe by your anesthesia provider. You may get medicine that relaxes you or puts you in a light sleep. The area being worked on will be numb.     · The procedure will take about an hour. When should you call your doctor? · You have questions or concerns.     · You don't understand how to prepare for your procedure.     · You become ill before the procedure (such as fever, flu, or a cold).     · You need to reschedule or have changed your mind about having the procedure. Where can you learn more? Go to https://Stakeforce.Mobyko. org and sign in to your bettercodes.org account. Enter N755 in the [employer] hellofood box to learn more about \"Pacemaker Placement: Before Your Procedure. \"     If you do not have an account, please click on the \"Sign Up Now\" link. Current as of: April 29, 2021               Content Version: 13.0  © 2006-2021 [employer]. Care instructions adapted under license by [facility] ([facility]). If you have questions about a medical condition or this instruction, always ask your healthcare professional. [place] [address] any warranty or liability for your use of this information. Patient Education        Pacemaker Placement: What to Expect at Home  Your Recovery     Pacemaker placement is surgery to put a pacemaker in your chest. This surgery may be done if you have bradycardia (a slow heart rate). Your doctor made a cut (incision) in your chest. The doctor put the pacemaker leads through the cut, into a large blood vessel, then into the heart. The doctor put the pacemaker under the skin of your chest and attached the leads to it. Your chest may be sore where the doctor made the cut. You also may have a bruise and mild swelling. These symptoms usually get better in 1 to 2 weeks.  You may feel a hard ridge along the incision. This usually gets softer in the months after surgery. You may be able to see or feel the outline of the pacemaker under your skin. You will probably be able to go back to work or your usual routine 1 to 2 weeks after surgery. Pacemaker batteries usually last 5 to 15 years. Your doctor will talk to you about how often you will need to have your pacemaker checked. You'll need to take steps to safely use electric devices. Some of these devices can stop your pacemaker from working right for a short time. Check with your doctor about what to avoid and what to keep a short distance away from your pacemaker. For example, you will need to stay away from things with strong magnetic and electrical fields. An example is an MRI machine (unless your pacemaker is safe for an MRI). You can use a cellphone and other wireless devices, but keep them at least 6 inches away from your pacemaker. Many household and office electronics don't affect a pacemaker. These include kitchen appliances and computers. This care sheet gives you a general idea about how long it will take for you to recover. But each person recovers at a different pace. Follow the steps below to get better as quickly as possible. How can you care for yourself at home? Activity    · Rest when you feel tired.     · Be active. Walking is a good choice.     · For 4 to 6 weeks:  ? Avoid activities that strain your chest or upper arm muscles. This includes pushing a  or vacuum, or mopping floors. It also includes swimming, or swinging a golf club or tennis racquet. ? Do not raise your arm (the one on the side of your body where the pacemaker is located) above your shoulder. ? Allow your body to heal. Don't move quickly or lift anything heavy until you are feeling better.     · Many people are able to return to work within 1 to 2 weeks after surgery.     · Ask your doctor when it is okay for you to have sex.    Diet    · You can eat your normal diet. If your stomach is upset, try bland, low-fat foods like plain rice, broiled chicken, toast, and yogurt. Medicines    · Your doctor will tell you if and when you can restart your medicines. He or she will also give you instructions about taking any new medicines.     · If you take aspirin or some other blood thinner, be sure to talk to your doctor. He or she will tell you if and when to start taking this medicine again. Make sure that you understand exactly what your doctor wants you to do.     · Be safe with medicines. Read and follow all instructions on the label. ? If the doctor gave you a prescription medicine for pain, take it as prescribed. ? If you are not taking a prescription pain medicine, ask your doctor if you can take an over-the-counter medicine. ? Do not take aspirin, ibuprofen (Advil, Motrin), naproxen (Aleve), or other nonsteroidal anti-inflammatory drugs (NSAIDs) unless your doctor says it is okay.     · If your doctor prescribed antibiotics, take them as directed. Do not stop taking them just because you feel better. You need to take the full course of antibiotics. Incision care    · If you have strips of tape on the incision, leave the tape on for a week or until it falls off.     · Keep the incision dry while it heals. Your doctor may recommend sponge baths for about 7 days, but do not get the incision wet. Your doctor will let you know when you may take showers. After a shower, pat the incision dry.     · Don't use hydrogen peroxide or alcohol on the incision, which can slow healing. You may cover the area with a gauze bandage if it oozes fluid or rubs against clothing. Change the bandage every day.     · Do not take a bath or get into a hot tub for the first 2 weeks, or until your doctor tells you it is okay. Other instructions    · Keep a medical ID card with you at all times that says you have a pacemaker.  The card should include the  and model information.     · Wear medical alert jewelry stating that you have a pacemaker. You can buy this at most drugstores.     · Check your pulse as directed by your doctor.     · Have your pacemaker checked as often as your doctor recommends. In some cases, this may be done over the phone or the Internet. Your doctor will give you instructions about how to do this. Follow-up care is a key part of your treatment and safety. Be sure to make and go to all appointments, and call your doctor if you are having problems. It's also a good idea to know your test results and keep a list of the medicines you take. When should you call for help? Call 911 anytime you think you may need emergency care. For example, call if:    · You passed out (lost consciousness).     · You have trouble breathing. Call your doctor now or seek immediate medical care if:    · You are dizzy or light-headed, or you feel like you may faint.     · You have pain that does not get better after you take pain medicine.     · You hear an alarm or feel a vibration from your pacemaker.     · You have loose stitches, or your incision comes open.     · Bright red blood has soaked through the bandage over your incision.     · You have signs of infection, such as:  ? Increased pain, swelling, warmth, or redness. ? Red streaks leading from the incision. ? Pus draining from the incision. ? A fever. Watch closely for changes in your health, and be sure to contact your doctor if:    · You have any problems with your pacemaker. Where can you learn more? Go to https://ShuamecathyZurn.AReflectionOf Inc.. org and sign in to your C[employer] account. Enter G550 in the [facility] box to learn more about \"Pacemaker Placement: What to Expect at Home. \"     If you do not have an account, please click on the \"Sign Up Now\" link. Current as of: April 29, 2021               Content Version: 13.0  © 5072-5483 Healthwise, Incorporated.    Care instructions adapted under license by [facility] ([facility]). If you have questions about a medical condition or this instruction, always ask your healthcare professional. [address] any warranty or liability for your use of this information.

## 2021-09-30 ENCOUNTER — NURSE ONLY (OUTPATIENT)
Dept: CARDIOLOGY CLINIC | Age: 86
End: 2021-09-30
Payer: MEDICARE

## 2021-09-30 DIAGNOSIS — R55 SYNCOPE AND COLLAPSE: ICD-10-CM

## 2021-09-30 DIAGNOSIS — Z95.818 STATUS POST PLACEMENT OF IMPLANTABLE LOOP RECORDER: ICD-10-CM

## 2021-09-30 PROCEDURE — 93298 REM INTERROG DEV EVAL SCRMS: CPT | Performed by: INTERNAL MEDICINE

## 2021-09-30 PROCEDURE — G2066 INTER DEVC REMOTE 30D: HCPCS | Performed by: INTERNAL MEDICINE

## 2021-09-30 NOTE — PROGRESS NOTES
ILR for syncope. We received a remote transmission from patient's monitor at home. Since 08.25.21:  1 Symptom event 09.12.21 illustrating possible AVB. 1 Pause event 09.20.21 @ 05:33, 4sec duration, w ECG illustrating true pause d/t intermittent heart block (Pwaves visible), previously noted on alert report and addressed at [place] [address]3 09.27.21. Pt scheduled for PPM implant/ILR explant 10.08.21/WSW. Noted hx false tachy recordings d/t Twave/artifact oversensing. EP physician to review. We will continue to monitor remotely.

## 2021-10-05 ENCOUNTER — TELEPHONE (OUTPATIENT)
Dept: CARDIOLOGY CLINIC | Age: 86
End: 2021-10-05

## 2021-10-05 NOTE — TELEPHONE ENCOUNTER
Spoke with patient's wife moved date of PPM implant & loop explant to 10/14/2021 arrival at 1 pm procedure at 230. Case published to [place] and form emailed to [place] in cath lab.

## 2021-10-13 NOTE — FLOWSHEET NOTE
Called patient to confirm procedure times. Patient to arrive at 1300 for procedure start time of 1430. Reviewed NPO after MN, patient to take meds with sip of water day of procedure. Hold diuretics day of procedure.

## 2021-10-14 ENCOUNTER — HOSPITAL ENCOUNTER (OUTPATIENT)
Dept: GENERAL RADIOLOGY | Age: 86
Discharge: HOME | End: 2021-10-14
Payer: MEDICARE

## 2021-10-14 ENCOUNTER — HOSPITAL ENCOUNTER (OUTPATIENT)
Dept: CARDIAC CATH/INVASIVE PROCEDURES | Age: 86
Discharge: HOME | End: 2021-10-14
Payer: MEDICARE

## 2021-10-14 VITALS
SYSTOLIC BLOOD PRESSURE: 162 MMHG | BODY MASS INDEX: 22.22 KG/M2 | TEMPERATURE: 97.5 F | HEIGHT: 69 IN | HEART RATE: 76 BPM | OXYGEN SATURATION: 99 % | DIASTOLIC BLOOD PRESSURE: 81 MMHG | RESPIRATION RATE: 12 BRPM | WEIGHT: 150 LBS

## 2021-10-14 PROCEDURE — C1785 PMKR, DUAL, RATE-RESP: HCPCS

## 2021-10-14 PROCEDURE — 33286 RMVL SUBQ CAR RHYTHM MNTR: CPT | Performed by: FAMILY MEDICINE

## 2021-10-14 PROCEDURE — C1892 INTRO/SHEATH,FIXED,PEEL-AWAY: HCPCS

## 2021-10-14 PROCEDURE — 2580000003 HC RX 258

## 2021-10-14 PROCEDURE — 33208 INSRT HEART PM ATRIAL & VENT: CPT | Performed by: INTERNAL MEDICINE

## 2021-10-14 PROCEDURE — 2709999900 HC NON-CHARGEABLE SUPPLY

## 2021-10-14 PROCEDURE — 6360000002 HC RX W HCPCS

## 2021-10-14 PROCEDURE — 2500000003 HC RX 250 WO HCPCS

## 2021-10-14 PROCEDURE — 33286 RMVL SUBQ CAR RHYTHM MNTR: CPT | Performed by: INTERNAL MEDICINE

## 2021-10-14 PROCEDURE — C1898 LEAD, PMKR, OTHER THAN TRANS: HCPCS

## 2021-10-14 PROCEDURE — 99152 MOD SED SAME PHYS/QHP 5/>YRS: CPT | Performed by: FAMILY MEDICINE

## 2021-10-14 PROCEDURE — 99153 MOD SED SAME PHYS/QHP EA: CPT | Performed by: FAMILY MEDICINE

## 2021-10-14 PROCEDURE — 99152 MOD SED SAME PHYS/QHP 5/>YRS: CPT | Performed by: INTERNAL MEDICINE

## 2021-10-14 PROCEDURE — 93005 ELECTROCARDIOGRAM TRACING: CPT | Performed by: INTERNAL MEDICINE

## 2021-10-14 PROCEDURE — 71046 X-RAY EXAM CHEST 2 VIEWS: CPT

## 2021-10-14 PROCEDURE — 33208 INSRT HEART PM ATRIAL & VENT: CPT | Performed by: FAMILY MEDICINE

## 2021-10-14 RX ORDER — SODIUM CHLORIDE 0.9 % (FLUSH) 0.9 %
5-40 SYRINGE (ML) INJECTION EVERY 12 HOURS SCHEDULED
Status: DISCONTINUED | OUTPATIENT
Start: 2021-10-14 | End: 2021-10-15 | Stop reason: HOSPADM

## 2021-10-14 RX ORDER — SODIUM CHLORIDE 9 MG/ML
25 INJECTION, SOLUTION INTRAVENOUS PRN
Status: DISCONTINUED | OUTPATIENT
Start: 2021-10-14 | End: 2021-10-15 | Stop reason: HOSPADM

## 2021-10-14 RX ORDER — SODIUM CHLORIDE 9 MG/ML
INJECTION, SOLUTION INTRAVENOUS CONTINUOUS
Status: DISCONTINUED | OUTPATIENT
Start: 2021-10-14 | End: 2021-10-15 | Stop reason: HOSPADM

## 2021-10-14 RX ORDER — SODIUM CHLORIDE 0.9 % (FLUSH) 0.9 %
5-40 SYRINGE (ML) INJECTION PRN
Status: DISCONTINUED | OUTPATIENT
Start: 2021-10-14 | End: 2021-10-15 | Stop reason: HOSPADM

## 2021-10-14 NOTE — H&P
Cardiac Electrophysiology Consultation   Date: 10/14/2021  Reason for Consultation: syncope, sinus pause  Consult Requesting Physician:Víctor Roche MD  Primary Care Physician: Pearlean Dakin, MD     Chief Complaint:        Chief Complaint   Patient presents with    Follow-up       afib - device check- SOB         HPI: Andrez Scheuermann is a 80 y.o. patient with a history of atrial fibrillation, syncope, prostate cancer and hypertension.     Interval History: Today, he presents to office accompanied by his wife for follow up on sinus pause seen on 9/20/2021at 530 am on his ILR interrogation. Patient stated the he was up using the bathroom and felt lightheaded and had to sit down. Patient denied having a syncopal episode at that time. His wife recalls a history of vagovagal syncope which occurred after using the bathroom. She states the he had been instructed to sit for a minute after using the bathroom to help prevent syncope and he has been doing this and it has prevented him from falling with his recent episodes. His wife states the majority of his episodes have occurred in the early morning hours when getting up to urinate. He states he get up three to four times a night to urinate. Jerel Shlomo He is compliant with his medications and tolerating them well.  He denies chest pain/pressure, tightness, edema, shortness of breath, heart racing, palpitations, PND or orthopnea.      Past Medical History        Past Medical History:   Diagnosis Date    Cancer [facility])       Prostate    Hemorrhoids      Hyperlipidemia      Hypertension      Influenza A 12/29/2014    Shortness of breath              Past Surgical History         Past Surgical History:   Procedure Laterality Date    CARPAL TUNNEL RELEASE Right 09/01/15     Right carpal tunnel release      CARPAL TUNNEL RELEASE Left 9/22/15    COLONOSCOPY N/A 4/8/2019     COLONOSCOPY DIAGNOSTIC performed by Lisa Lomas MD at [facility]         right wrist    ENDOSCOPY, COLON, DIAGNOSTIC        HERNIA REPAIR Right 1946     inguinal    UPPER GASTROINTESTINAL ENDOSCOPY         with dilatation            Allergies:  No Known Allergies     Medication:   Home Medications           Prior to Admission medications    Medication Sig Start Date End Date Taking? Authorizing Provider   apixaban (ELIQUIS) 2.5 MG TABS tablet TAKE ONE TABLET BY MOUTH TWICE A DAY 9/20/21   Yes Jennifer Driver MD   [facility]. Devices (WALKER) MISC 1 each by Does not apply route daily Dispense and Fit for injury to lower extremity and weakness. 3/27/20   Yes Tasneem Adair PA-C   Dietary Management Product (SENTRA AM PO) Take by mouth daily Indications: For men     Yes Historical Provider, MD   leuprolide acetate, 6 Month, (ELIGARD) 45 MG injection Inject 45 mg into the skin every 6 months     Yes Historical Provider, MD   albuterol sulfate  (90 Base) MCG/ACT inhaler Inhale 2 puffs into the lungs 4 times daily as needed for Wheezing 8/13/19   Yes Lance Pretty MD   Multiple Minerals-Vitamins (PROSTEON PO) Take 2 tablets by mouth 2 times daily      Yes Historical Provider, MD            Social History:   reports that he quit smoking about 66 years ago. He has never used smokeless tobacco. He reports that he does not drink alcohol and does not use drugs.         Family History:  family history includes Hypertension in an other family member; Stroke in an other family member. Reviewed.  Denies family history of sudden cardiac death, arrhythmia, premature CAD     Review of System:     · General ROS: negative for - chills, fever   · Psychological ROS: negative for - anxiety or depression  · Ophthalmic ROS: negative for - eye pain or loss of vision  · ENT ROS: negative for - epistaxis, headaches, nasal discharge, sore throat   · Allergy and Immunology ROS: negative for - hives, nasal congestion   · Hematological and Lymphatic ROS: negative for - bleeding problems, blood clots, bruising or jaundice  · Endocrine ROS: negative for - skin changes, temperature intolerance or unexpected weight changes  · Respiratory ROS: negative for - cough, hemoptysis, pleuritic pain, SOB, sputum changes or wheezing  · Cardiovascular ROS: Per HPI. · Gastrointestinal ROS: negative for - abdominal pain, blood in stools, diarrhea, hematemesis, melena, nausea/vomiting or swallowing difficulty/pain  · Genito-Urinary ROS: negative for - dysuria or incontinence  · Musculoskeletal ROS: negative for - joint swelling or muscle pain  · Neurological ROS: negative for - confusion, dizziness, gait disturbance, headaches, numbness/tingling, seizures, speech problems, tremors, visual changes or weakness  · Dermatological ROS: negative for - rash     Physical Examination:      Vitals:     21 1314   BP: 130/70   Pulse: 80   SpO2: 96%         · Constitutional: Oriented. No distress. · Head: Normocephalic and atraumatic. · Mouth/Throat: Oropharynx is clear and moist.   · Eyes: Conjunctivae normal. EOM are normal.   · Neck: Normal range of motion. Neck supple. No rigidity. No JVD present. · Cardiovascular: Normal rate, regular rhythm, S1&S2 and intact distal pulses. · Pulmonary/Chest: Bilateral respiratory sounds. No wheezes. No rhonchi. · Abdominal: Soft. Bowel sounds present. No distension, No tenderness. · Musculoskeletal: No tenderness. No edema    · Lymphadenopathy: Has no cervical adenopathy. · Neurological: Alert and oriented. Cranial nerve appears intact, No Gross deficit   · Skin: Skin is warm and dry. No rash noted. · Psychiatric: Has a normal mood, affect and behavior      Labs:  Reviewed.      EC2021 sinus rhythm, frst degree A-V block with v-rate of 72 bpm with QRS duration 88 ms. No pathologic Q waves, ventricular pre-excitation, or QT prolongation.      Studies:   1.  CAM patch: -20  NSR with 1st degree AVB  6 episodes of atrial tachycardia  PAC/PVCs   No bradycardia, Afib or Aflutter present     2. Echo: 7/6/2021  Left ventricular cavity size is normal. There is moderate asymmetric  hypertrophy of the basal septum. Overall left ventricular systolic function appears hyperdynamic. Ejection fraction is visually estimated to be >70%. No regional wall motion abnormalities are noted. There is a late peak in  gradient recorded across the LV outflow tract with a peak gradient of  52mmHg. with Valsalva maneuver. Mitral annular calcification is present. Mitral valve leaflets appear mildly thickened. Systolic ant motion of anterior mitral leaflet. Moderate mitral regurgitation. Aortic valve appears sclerotic but appears to open adequately. Mild tricuspid regurgitation.     3. Stress Test:  n/a        4. Cath: n/a     I independently reviewed and interpreted the ECG, MCOT, echocardiogram, stress test, and coronary angiography/PCI results and used them for my plan of care. Procedures:  1.      Assessment/Plan:      Symptomatic sinus pause  Seen on ILR interrogation occurring on 9/20/2021. Symptomatic with lightheadedness.     The risks and benefits of a PPM implantation were discussed at length with the patient. Much time was spent with the patient in a shared-decision making approach, discussing the pathophysiology of the patient's diagnosis of ventricular asystolia,  the risk of presyncope or syncope, the utility of a PPM, the benefit and risks of the implantation of a PPM, the benefits and risks of living with a PPM, the lifestyle changes that come along with having a PPM, and the logistics of a PPM care and generator changes that go along with having a PPM.     The risks including, but not limited to, the risks of vascular injury, bleeding, infection, device malfunction, lead dislodgement, radiation exposure, injury to cardiac and surrounding structures (including pneumothorax), stroke, myocardial infarction and death were discussed in detail.  The patient was also counseled at length about the risks of lana Covid-19 in the michell-operative and post-operative states including the recovery window of their procedure. The patient was made aware that lana Covid-19 after a surgical procedure may worsen their prognosis for recovering from the virus and lend to a higher morbidity and or mortality risk. The patient was given the option of postponing their procedure. The patient was also presented reasonable alternatives to the proposed care, treatment, and services. The discussion I have had with the patient encompassed risks, benefits, and side effects related to the alternatives and the risks related to not receiving the proposed care, treatment and services.      All of the topics above were covered with the patient. A literature packet from our office was also given to the patient to provide supplementary information for the patient as it pertains to PPM implantation, lifestyle changes, and the benefits and risks of undergoing the implantation and living with a PPM.      I spent 40 minutes face to face with the patient, with greater than 50% of that time spent in counseling on the above.     The patient meets a Class I indication for a PPM implantation for the diagnosis of ventricular asystolia . The patient has opted to proceed with the PPM implantation, and we will proceed with scheduling a Medtronic 2-chamber PPM implantation for symptomatic ventricular asystolia.  We will explant his ILR prior to implanting the PPM.  We will hold his Eliquis 3 day prior to his procedure.     Paroxysmal atrial fibrillation  -He has a VUT4OM1-KBPm Score 3 (HTN, AGE)  -On Eliquis 2.5 mg BID for  thromboembolic risk reduction.  -Tolerating well no signs or symptoms of abnormal bruising or bleeding.  -He is not a candidate for an ablation due to his advance age.        Follow up 1 week for wound check and 3 months with Tasneem Moreno CNP after PPM implantation.     Thank you for allowing me to participate in the care of Tal Nunn. All questions and concerns were addressed to the patient/family. Alternatives to my treatment were discussed.      I have reviewed the history and physical and examined the patient and find no relevant changes. I have reviewed with the patient and/or family the risks and benefits to the proposed procedure. The patient was presented with the option of postponing the proposed procedure. The patient was also presented reasonable alternatives to the proposed care, treatment, and services.  The discussion I have had with the patient encompassed risks, benefits, and side effects related to the alternatives and the risks related to not receiving the proposed care, treatment and services.      Enrique Norman MD, [address], [facility], [facility] 99 Electrophysiology  [address] [facility], [address] [place] [address]  Misael Traylor [facility] [address]  [phone]

## 2021-10-14 NOTE — PRE SEDATION
Sedation Pre-Procedure Note    Patient Name: Matteo Marino   YOB: 1926  Room/Bed: Room/bed info not found  Medical Record Number: [id number]  Date: 10/14/2021   Time: 8:04 AM       Indication:  Sinus pause, symptomatic    Consent: I have discussed with the patient and/or the patient representative the indication, alternatives, and the possible risks and/or complications of the planned procedure and the anesthesia methods. The patient and/or patient representative appear to understand and agree to proceed. Vital Signs: There were no vitals filed for this visit. Past Medical History:   has a past medical history of Cancer ([facility] [address] Hemorrhoids, Hyperlipidemia, Hypertension, Influenza A, and Shortness of breath. Past Surgical History:   has a past surgical history that includes Endoscopy, colon, diagnostic; Upper gastrointestinal endoscopy; hernia repair (Right, 1946); cyst removal; Carpal tunnel release (Right, 09/01/15); Carpal tunnel release (Left, 9/22/15); and Colonoscopy (N/A, 4/8/2019). Medications:   Scheduled Meds:   Continuous Infusions:   PRN Meds:   Home Meds:   Prior to Admission medications    Medication Sig Start Date End Date Taking? Authorizing Provider   apixaban (ELIQUIS) 2.5 MG TABS tablet TAKE ONE TABLET BY MOUTH TWICE A DAY 9/20/21   Donya Odom MD   Misc. Devices (WALKER) MISC 1 each by Does not apply route daily Dispense and Fit for injury to lower extremity and weakness. 3/27/20   [facility]   Dietary Management Product (SENTRA AM PO) Take by mouth daily Indications:  For men    Historical Provider, MD   leuprolide acetate, 6 Month, (ELIGARD) 45 MG injection Inject 45 mg into the skin every 6 months    Historical Provider, MD   albuterol sulfate  (90 Base) MCG/ACT inhaler Inhale 2 puffs into the lungs 4 times daily as needed for Wheezing 8/13/19   C Nereyda Green MD   Multiple Minerals-Vitamins (PROSTEON PO) Take 2 tablets by mouth 2 times daily     Historical Provider, MD     Coumadin Use Last 7 Days:  no  Antiplatelet drug therapy use last 7 days: no  Other anticoagulant use last 7 days: yes - Eliquis  Additional Medication Information:  n/a      Pre-Sedation Documentation and Exam:   I have personally completed a history, physical exam & review of systems for this patient (see notes).     Mallampati Airway Assessment:  Mallampati Class I - (soft palate, fauces, uvula & anterior/posterior tonsillar pillars are visible)    Prior History of Anesthesia Complications:   none    ASA Classification:  Class 2 - A normal healthy patient with mild systemic disease    Sedation/ Anesthesia Plan:   intravenous sedation    Medications Planned:   midazolam (Versed) intravenously and fentanyl intravenously    Patient is an appropriate candidate for plan of sedation: yes    Electronically signed by Rosangela Rouse MD on 10/14/2021 at 8:04 AM

## 2021-10-14 NOTE — PROCEDURES
[facility]     Electrophysiology Procedure Note       Date of Procedure: 10/14/2021  Patient's Name: Narendra Vargas  YOB: 1926   Medical Record Number: [id number]  Procedure Performed by: Rosangela Rouse MD    Procedures performed:  · Insertion of MRI compatible right ventricular pacing lead under fluoroscopy. · Insertion of MRI compatible right atrial lead under fluoroscopy  · Insertion of a MRI compatible [dual/single] chamber Pacemaker. · Electronic analysis of lead and device. · Removal of implantable loop recorder   · Anesthesia: Local and Monitored Anesthesia Care  · Level of sedation plan: Moderate sedation (conscious sedation) with intravenous Midazolam 3 mg and Fentanyl 125 mcg   · Sedation start time: 1627  · Sedation stop time: 1730  · Mallampati airway assessment class: I  · ASA class: 1      Indication of the procedure: Narendra Vargas is a 80 y.o. male who is being referred for pacemaker implantation due to non-reversible bradycardia secondary to sinus pause of irreversible cause with symptoms of presyncope. Details of procedure: The patient was brought to the electrophysiology laboratory in stable condition. The patient was in a fasting and non-sedated state. The risks, benefits and alternatives of the procedure were discussed with the patient. The risks including, but not limited to, the risks of vascular injury, bleeding, infection, device malfunction, lead dislodgement, radiation exposure, injury to cardiac and surrounding structures (including pneumothorax), stroke, myocardial infarction and death were discussed in detail. The patient was also counseled at length about the risks of lana Covid-19 in the michell-operative and post-operative states including the recovery window of their procedure.  The patient was made aware that lana Covid-19 after a surgical procedure may worsen their prognosis for recovering from the virus and lend to a higher morbidity and or mortality risk. The patient was given the option of postponing their procedure. The patient was also presented reasonable alternatives to the proposed care, treatment, and services. The discussion I have had with the patient encompassed risks, benefits, and side effects related to the alternatives and the risks related to not receiving the proposed care, treatment and services. The patient opted to proceed with the device implantation. Written informed consent was signed and placed in the chart. Prophylactic antibiotic using Ancef 1mg IV was given. The patient was prepped and draped in sterile fashion. A timeout protocol was completed to identify the patient and the procedure being performed. An independent trained observer assumed the sole responsibility of administering IV sedation medication - Versed, Fentanyl - at my direction and closely monitored the patient. The patient was monitored continuously with ECG, pulse oximetry, blood pressure monitoring, and direct observation. An incision was made in the left upper pectoral area in a transverse line roughly 1 cm from the clavicle after administration of lidocaine/bupivicaine combination. Using electrocautery and blunt dissection, a pocket was created. Central venous access into the left axillary vein was obtained using the modified Seldinger technique. After central venous access was obtained, a sheath was placed in the axillary vein. A right ventricular lead was advanced into the apical position under fluoroscopic guidance and using a series of curved and straight stylets. The lead was actively fixated. After confirming appropriate function and no diaphragmatic stimulation at maximum output, the sheath was split and removed. The lead was secured to the underlying tissue using suture material.      A new sheath was advanced over a second previously placed wire into the vein.  The atrial lead was advanced to the right atrial appendage and actively fixated under fluoroscopic guidance. After confirming appropriate function and no diaphragmatic stimulation at maximum output, the sheath was split and removed. The lead was secured to the underlying tissue using suture. The leads were then connected to the new pulse generator which was then placed into the pocket. Copious amount (> 200 cc) of saline flush was used to irrigate the pocket. The pocket was then closed using a 2-0 Vicryl subcutaneous layer and a subcuticular layer using 4-0 Vicryl. The skin was covered with Steri-Strips and pressure dressing. All sponge and needle counts were reported as correct at the end of the procedure. Loop explantation    The patient was prepped and draped in a sterile fashion. Pre-sedation evaluation and airway assessment was completed. IV sedation was not administered. The patient was monitored continuously with ECG, pulse oximetry, blood pressure monitoring, and direct observation. Chest was prepped and draped in the usual sterile fashion. After injection of 2% lidocaine and bupivicaine, an incision was made over the previous scar and extended to the pocket using a #11 scalpel. The old loop recorder device was removed. The pocket was then closed using 4-0 Vicryl. The skin was covered with Steri-Strips and pressure dressing. Specimen collected: none    Estimated blood loss: < 20 cc    The patient tolerated the procedure well and there were no complications. Patient was transported to the holding area in stable condition.      Device and Leads information:          Impression:    Pre- and post-procedural diagnoses of non-reversible bradycardia secondary to sinus pause of irreversible cause with symptoms of presyncope with successful implantation of a Medtronic 2-chamber PPM. Successful removal of implantable loop recorder (Medtronic ILR Serial # [id number])    Plan:     The patient will be observed and receive the usual post-implant care, including chest x-ray, intravenous antibiotic therapy, and interrogation of the device. From an EP perspective, if the interrogation and CXR are showing appropriate functioning, parameters and placement, the patient may be discharged from the hospital today with a Carelink device. Follow-up will be a 1-week wound check with our nurse in the [facility] and a 3-month follow-up with the EP NP. Thank you for allowing us to participate in the care of your patient. If you have any questions, please do not hesitate to contact me.     Alison Hope MD, MS, [facility], [facility]  Cardiac Electrophysiology  [address]  [address] [facility], [address] [place] [address]  Misael Uriostegui [facility] [address]  [phone]

## 2021-10-15 LAB
EKG ATRIAL RATE: 73 BPM
EKG DIAGNOSIS: NORMAL
EKG P AXIS: 95 DEGREES
EKG P-R INTERVAL: 278 MS
EKG Q-T INTERVAL: 450 MS
EKG QRS DURATION: 104 MS
EKG QTC CALCULATION (BAZETT): 495 MS
EKG R AXIS: -68 DEGREES
EKG T AXIS: 40 DEGREES
EKG VENTRICULAR RATE: 73 BPM

## 2021-10-15 PROCEDURE — 93010 ELECTROCARDIOGRAM REPORT: CPT | Performed by: INTERNAL MEDICINE

## 2021-10-18 PROBLEM — Z95.0 CARDIAC PACEMAKER IN SITU: Chronic | Status: ACTIVE | Noted: 2021-10-14

## 2021-10-18 PROBLEM — Z95.818 STATUS POST PLACEMENT OF IMPLANTABLE LOOP RECORDER: Status: RESOLVED | Noted: 2020-09-16 | Resolved: 2021-10-18

## 2021-10-20 ENCOUNTER — NURSE ONLY (OUTPATIENT)
Dept: CARDIOLOGY CLINIC | Age: 86
End: 2021-10-20
Payer: MEDICARE

## 2021-10-20 DIAGNOSIS — R00.1 SYMPTOMATIC BRADYCARDIA: ICD-10-CM

## 2021-10-20 DIAGNOSIS — R55 SYNCOPE AND COLLAPSE: ICD-10-CM

## 2021-10-20 DIAGNOSIS — Z95.0 CARDIAC PACEMAKER IN SITU: Chronic | ICD-10-CM

## 2021-10-20 DIAGNOSIS — I48.0 PAROXYSMAL ATRIAL FIBRILLATION (HCC): ICD-10-CM

## 2021-10-20 DIAGNOSIS — Z95.818 STATUS POST PLACEMENT OF IMPLANTABLE LOOP RECORDER: ICD-10-CM

## 2021-10-20 PROCEDURE — 93280 PM DEVICE PROGR EVAL DUAL: CPT | Performed by: INTERNAL MEDICINE

## 2021-10-20 NOTE — PROGRESS NOTES
Pt seen in clinic today for cardiac device interrogation and site check 1 week post implant. ILR explant site and PPM implant site show no signs of infection. wound care instructions reviewed with patient and spouse. Their device is a  MDT 2 chamber PPM    Based on threshold, impedance, and intrinsic sensing tests run today, the device appears to be functioning normally. Remaining battery life is 15y4m  AP 0.79%                  0.18%      Pt had a dizziness and a fall yesterday, pt spoke with patient, determined that this was likely vesovagal syncope as it occurred directly after urination. Rx: apixaban (ELIQUIS) 2.5 MG TABS tablet 1 tab po bid - held till tomorrow per Dr. Rosalina Peoples. Pt was informed of findings today and general questions have been answered with regard to device. Home monitoring hardware is transmitting on schedule.      Results discussed with or to be reviewed by EP

## 2021-11-02 NOTE — DISCHARGE INSTR - COC
R10.9    Abdominal pain, other specified site R10.9    Dysphagia R13.10    Esophagitis K20.9    Flu vaccine need Z23    Prostate cancer ([facility] [address] C61    Bilateral hand pain M79.641, M79.642    Carpal tunnel syndrome of right wrist G56.01    Iron deficiency anemia due to chronic blood loss D50.0    Arthritis of left knee M17.12    Paroxysmal atrial fibrillation (HCC) I48.0    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    Symptomatic bradycardia R00.1    Syncope and collapse R55    Subdural hematoma (HCC) S06.5X9A    GI bleed K92.2       Isolation/Infection:   Isolation          No Isolation        Patient Infection Status     None to display          Nurse Assessment:  Last Vital Signs: BP (!) 143/57   Pulse 78   Temp 99 °F (37.2 °C) (Oral)   Resp 8   SpO2 97%     Last documented pain score (0-10 scale): Pain Level: 3  Last Weight:   Wt Readings from Last 1 Encounters:   12/17/19 155 lb 9.6 oz (70.6 kg)     Mental Status:  oriented and alert    IV Access:  - None    Nursing Mobility/ADLs:  Walking   Assisted  Transfer  Assisted  Bathing  Assisted  Dressing  Assisted  Toileting  Assisted  Feeding  Assisted  Med Admin  Assisted  Med Delivery   none    Wound Care Documentation and Therapy:  Incision 09/01/15 Hand Right (Active)   Number of days: 4697       Incision 09/22/15 Wrist Left (Active)   Number of days: 5699        Elimination:  Continence:   · Bowel: unknown  · Bladder: Yes  Urinary Catheter: n/a   Colostomy/Ileostomy/Ileal Conduit: No       Date of Last BM: n/a  No intake or output data in the 24 hours ending 03/27/20 1412  No intake/output data recorded. Safety Concerns:      At Risk for Falls    Impairments/Disabilities:      Hearing    Nutrition Therapy:  Current Nutrition Therapy:   - none    Routes of Feeding: Oral  Liquids: No Restrictions  Daily Fluid Restriction: no  Last Modified Barium Swallow with Video (Video Swallowing Test): not done    Treatments at the Time of Hospital Discharge: ambulatory

## 2021-11-22 ENCOUNTER — NURSE ONLY (OUTPATIENT)
Dept: CARDIOLOGY CLINIC | Age: 86
End: 2021-11-22
Payer: MEDICARE

## 2021-11-22 DIAGNOSIS — R00.1 SYMPTOMATIC BRADYCARDIA: ICD-10-CM

## 2021-11-22 DIAGNOSIS — Z95.0 CARDIAC PACEMAKER IN SITU: Chronic | ICD-10-CM

## 2021-11-22 NOTE — PROGRESS NOTES
Remote transmission received from patient's dual chamber pacemaker monitor at home. Transmission shows normal sensing and pacing function. No new arrhythmias/events recorded. EP physician will review. See interrogation under cardiology tab in the [address] [place] [address] [address] field for more details. Will continue to monitor remotely.

## 2021-11-23 PROCEDURE — 93294 REM INTERROG EVL PM/LDLS PM: CPT | Performed by: INTERNAL MEDICINE

## 2021-11-23 PROCEDURE — 93296 REM INTERROG EVL PM/IDS: CPT | Performed by: INTERNAL MEDICINE

## 2022-01-12 NOTE — PROGRESS NOTES
[address]   Electrophysiology      Date: 1/13/2022    Primary Cardiologist: Georgia Gaviria MD  PCP: Rodriguez Jenkins MD     Chief Complaint:   Chief Complaint   Patient presents with    3 Month Follow-Up     No cc     History of Present Illness:    I saw Martin Samano in the office for electrophysiology follow up today. He is a 80 y.o. male with a past medical history of atrial fibrillation, syncope s/p ILR, prostate cancer and hypertension. He was noted to have symptomatic sinus pauses in September in ILR check. He underwent implantation of a Medtronic dual chamber pacemaker on 10/14/21 with Dr. Esperanza Red. He presents today for procedure follow up. He has been doing well since his pacemaker. He does still occasionally get lightheaded but is unable to elaborate any particular trigger for this. Denies any syncope or near syncope. No chest pain or palpitations. He does get short of breath when he over exerts himself. Denies any edema. No bleeding issues or falling. Allergies:  No Known Allergies  Home Medications:  Prior to Visit Medications    Medication Sig Taking? Authorizing Provider   calcium carbonate (OSCAL) 500 MG TABS tablet Take 500 mg by mouth daily Yes Historical Provider, MD   apixaban (ELIQUIS) 2.5 MG TABS tablet TAKE ONE TABLET BY MOUTH TWICE A DAY Yes Andrzej Moeller MD   Misc. Devices (WALKER) MISC 1 each by Does not apply route daily Dispense and Fit for injury to lower extremity and weakness. Yes Óscar Rucker PA-C   Dietary Management Product (SENTRA AM PO) Take by mouth daily Indications:  For men Yes Historical Provider, MD   leuprolide acetate, 6 Month, (ELIGARD) 45 MG injection Inject 45 mg into the skin every 6 months Yes Historical Provider, MD   albuterol sulfate  (90 Base) MCG/ACT inhaler Inhale 2 puffs into the lungs 4 times daily as needed for Wheezing Yes Aldair Torres MD   Multiple Minerals-Vitamins (PROSTEON PO) Take 2 tablets by mouth 2 times daily  Yes 01/13/22 150 lb 12.8 oz (68.4 kg)   10/14/21 150 lb (68 kg)   09/27/21 150 lb (68 kg)       Physical Exam  Vitals reviewed. Constitutional:       General: He is not in acute distress. Appearance: Normal appearance. HENT:      Head: Normocephalic and atraumatic. Nose: Nose normal.      Mouth/Throat:      Mouth: Mucous membranes are moist.   Eyes:      Conjunctiva/sclera: Conjunctivae normal.      Pupils: Pupils are equal, round, and reactive to light. Cardiovascular:      Rate and Rhythm: Normal rate and regular rhythm. Heart sounds: No murmur heard. No friction rub. No gallop. Comments: PPM site to L upper chest, incision well healed with no erythema or drainage. Pulmonary:      Effort: No respiratory distress. Breath sounds: No wheezing, rhonchi or rales. Abdominal:      General: Abdomen is flat. Bowel sounds are normal.      Palpations: Abdomen is soft. Musculoskeletal:         General: Normal range of motion. Right lower leg: No edema. Left lower leg: No edema. Skin:     General: Skin is warm and dry. Findings: No bruising. Neurological:      General: No focal deficit present. Mental Status: He is alert and oriented to person, place, and time. Motor: No weakness. Psychiatric:         Mood and Affect: Mood normal.         Behavior: Behavior normal.          Pertinent labs, diagnostic, device, and imaging results reviewed as a part of this visit    LABS    CBC:   Lab Results   Component Value Date    WBC 5.6 10/01/2021    HGB 11.0 (L) 10/01/2021    HCT 32.9 (L) 10/01/2021    MCV 94.8 10/01/2021     10/01/2021     BMP:   Lab Results   Component Value Date    CREATININE 1.4 (H) 10/01/2021    BUN 21 (H) 10/01/2021     10/01/2021    K 4.9 10/01/2021     10/01/2021    CO2 25 10/01/2021     Estimated Creatinine Clearance: 31 mL/min (A) (based on SCr of 1.4 mg/dL (H)).    No results found for: BNP    Thyroid:   Lab Results   Component Value Date    TSH 1.73 2021     Lipid Panel:   Lab Results   Component Value Date    CHOL 233 2021    HDL 82 2021    HDL 69 2012    TRIG 72 2021     LFTs:  Lab Results   Component Value Date    ALT 11 2021    AST 22 2021    ALKPHOS 85 2021    BILITOT <0.2 2021     Coags: No results found for: PROTIME, INR, APTT    EC2022   SR at 74 BPM. Prolonged [place] interval.     Echo: 18   Left ventricular cavity size is normal. There is moderate asymmetric   hypertrophy of the basal septum. Overall left ventricular systolic function   appears hyperdynamic. Ejection fraction is visually estimated to be >70%. No   regional wall motion abnormalities are noted. There is a late peaking   gradient recorded across the LV outflow tract with a peak gradient of   52mmHg. with Valsalva maneuver. Mitral annular calcification is present. Mitral valve leaflets appear mildly thickened. systolic ant motion of anterior mitral leaflet. Moderate mitral regurgitation. Aortic valve appears sclerotic but appears to open adequately. Mild tricuspid regurgitation. Stress test: 17  Normal myocardial perfusion study.    Normal LV size and systolic function.    Uncontrolled hypertension.    Overall findings represent a low risk study. EP Procedures:  1. ILR implant, 20, Dr. Reema Augustine  2. Medtronic dual chamber PPM, ILR explant, 10/14/21, Dr. Hamilton Line interrogation: 2022   Normal device function. Battery life 15.2 years  A paced 2.3%  V paced 0.6%  No new episodes since last check.     Assessment & Plan:    SSS   - with symptomatic sinus pause seen on ILR    - s/p Medtronic dual chamber PPM implanted 10/14/21   - device interrogation as above, device with normal function   - continue with routine follow up with device clinic    Paroxysmal atrial fibrillation   - CHADS2-VASc 3 (age, HTN) on Eliquis 2.5mg BID   - not an ablation candidate due to age   - device interrogation today with no arrhythmias     Thank you for allowing to us to participate in the care of Irven Face.    Return in about 1 year (around 1/13/2023) for an appointment with Jocelin Foss NP.      VALDEMAR May  [facility], [address] [place] [address], [address] [facility]  Phone: [phone]  Fax: [phone]    Electronically signed by VALDEMAR Osborn - CNP on 1/13/2022 at 3:39 PM

## 2022-01-13 ENCOUNTER — OFFICE VISIT (OUTPATIENT)
Dept: CARDIOLOGY CLINIC | Age: 87
End: 2022-01-13
Payer: MEDICARE

## 2022-01-13 VITALS
DIASTOLIC BLOOD PRESSURE: 60 MMHG | WEIGHT: 150.8 LBS | HEIGHT: 69 IN | HEART RATE: 77 BPM | BODY MASS INDEX: 22.33 KG/M2 | OXYGEN SATURATION: 98 % | SYSTOLIC BLOOD PRESSURE: 120 MMHG

## 2022-01-13 DIAGNOSIS — I48.0 PAF (PAROXYSMAL ATRIAL FIBRILLATION) (HCC): ICD-10-CM

## 2022-01-13 DIAGNOSIS — I49.5 SSS (SICK SINUS SYNDROME) (HCC): Primary | ICD-10-CM

## 2022-01-13 DIAGNOSIS — Z95.0 CARDIAC PACEMAKER IN SITU: ICD-10-CM

## 2022-01-13 PROCEDURE — 93000 ELECTROCARDIOGRAM COMPLETE: CPT | Performed by: NURSE PRACTITIONER

## 2022-01-13 PROCEDURE — 1123F ACP DISCUSS/DSCN MKR DOCD: CPT | Performed by: NURSE PRACTITIONER

## 2022-01-13 PROCEDURE — G8420 CALC BMI NORM PARAMETERS: HCPCS | Performed by: NURSE PRACTITIONER

## 2022-01-13 PROCEDURE — 1036F TOBACCO NON-USER: CPT | Performed by: NURSE PRACTITIONER

## 2022-01-13 PROCEDURE — G8427 DOCREV CUR MEDS BY ELIG CLIN: HCPCS | Performed by: NURSE PRACTITIONER

## 2022-01-13 PROCEDURE — 4040F PNEUMOC VAC/ADMIN/RCVD: CPT | Performed by: NURSE PRACTITIONER

## 2022-01-13 PROCEDURE — 99214 OFFICE O/P EST MOD 30 MIN: CPT | Performed by: NURSE PRACTITIONER

## 2022-01-13 PROCEDURE — G8484 FLU IMMUNIZE NO ADMIN: HCPCS | Performed by: NURSE PRACTITIONER

## 2022-01-13 RX ORDER — CALCIUM CARBONATE 500(1250)
500 TABLET ORAL DAILY
COMMUNITY

## 2022-01-13 ASSESSMENT — ENCOUNTER SYMPTOMS
SORE THROAT: 0
NAUSEA: 0
WHEEZING: 0
VOMITING: 0
COLOR CHANGE: 0
SINUS PRESSURE: 0
BACK PAIN: 0
CONSTIPATION: 0
ABDOMINAL PAIN: 0
COUGH: 0
BLOOD IN STOOL: 0
SHORTNESS OF BREATH: 1
DIARRHEA: 0
TROUBLE SWALLOWING: 0

## 2022-02-21 ENCOUNTER — NURSE ONLY (OUTPATIENT)
Dept: CARDIOLOGY CLINIC | Age: 87
End: 2022-02-21
Payer: MEDICARE

## 2022-02-21 DIAGNOSIS — Z95.0 CARDIAC PACEMAKER IN SITU: Chronic | ICD-10-CM

## 2022-02-21 DIAGNOSIS — R00.1 SYMPTOMATIC BRADYCARDIA: ICD-10-CM

## 2022-02-21 NOTE — PROGRESS NOTES
Remote transmission received from patient's dual chamber pacemaker monitor at home. Transmission shows normal sensing and pacing function. No new arrhythmias/events recorded. EP physician will review. See interrogation under cardiology tab in the [address] [place] [address] [address] field for more details. Will continue to monitor remotely.

## 2022-02-24 PROCEDURE — 93296 REM INTERROG EVL PM/IDS: CPT | Performed by: INTERNAL MEDICINE

## 2022-02-24 PROCEDURE — 93294 REM INTERROG EVL PM/LDLS PM: CPT | Performed by: INTERNAL MEDICINE

## 2022-05-25 ENCOUNTER — NURSE ONLY (OUTPATIENT)
Dept: CARDIOLOGY CLINIC | Age: 87
End: 2022-05-25
Payer: MEDICARE

## 2022-05-25 DIAGNOSIS — R00.1 SYMPTOMATIC BRADYCARDIA: ICD-10-CM

## 2022-05-25 DIAGNOSIS — Z95.0 CARDIAC PACEMAKER IN SITU: Chronic | ICD-10-CM

## 2022-05-25 PROCEDURE — 93294 REM INTERROG EVL PM/LDLS PM: CPT | Performed by: INTERNAL MEDICINE

## 2022-05-25 PROCEDURE — 93296 REM INTERROG EVL PM/IDS: CPT | Performed by: INTERNAL MEDICINE

## 2022-05-25 NOTE — PROGRESS NOTES
[facility] @ 1916  RE: diverticulitis, failing OP trmt per LUCINDA Villavicencio NP CB @ 6265 Remote transmission received from patient's dual chamber pacemaker monitor at home. Transmission shows normal sensing and pacing function. NSVT noted (doesn't appear to be on a beta blocker). EP physician will review. See interrogation under cardiology tab in the [address] [place] [address] [address] field for more details. Will continue to monitor remotely.

## 2022-08-03 LAB
A/G RATIO: 1.4 (ref 1.1–2.2)
ALBUMIN SERPL-MCNC: 4.1 G/DL (ref 3.4–5)
ALP BLD-CCNC: 65 U/L (ref 40–129)
ALT SERPL-CCNC: 11 U/L (ref 10–40)
ANION GAP SERPL CALCULATED.3IONS-SCNC: 11 MMOL/L (ref 3–16)
AST SERPL-CCNC: 23 U/L (ref 15–37)
BASOPHILS ABSOLUTE: 0.1 K/UL (ref 0–0.2)
BASOPHILS RELATIVE PERCENT: 1.4 %
BILIRUB SERPL-MCNC: <0.2 MG/DL (ref 0–1)
BUN BLDV-MCNC: 19 MG/DL (ref 7–20)
CALCIUM SERPL-MCNC: 9.7 MG/DL (ref 8.3–10.6)
CHLORIDE BLD-SCNC: 102 MMOL/L (ref 99–110)
CHOLESTEROL, TOTAL: 245 MG/DL (ref 0–199)
CO2: 27 MMOL/L (ref 21–32)
CREAT SERPL-MCNC: 1.3 MG/DL (ref 0.8–1.3)
EOSINOPHILS ABSOLUTE: 0.2 K/UL (ref 0–0.6)
EOSINOPHILS RELATIVE PERCENT: 4.2 %
GFR AFRICAN AMERICAN: >60
GFR NON-AFRICAN AMERICAN: 51
GLUCOSE BLD-MCNC: 101 MG/DL (ref 70–99)
HCT VFR BLD CALC: 31.9 % (ref 40.5–52.5)
HDLC SERPL-MCNC: 83 MG/DL (ref 40–60)
HEMOGLOBIN: 10.7 G/DL (ref 13.5–17.5)
LDL CHOLESTEROL CALCULATED: 144 MG/DL
LYMPHOCYTES ABSOLUTE: 2.1 K/UL (ref 1–5.1)
LYMPHOCYTES RELATIVE PERCENT: 39.9 %
MCH RBC QN AUTO: 31.7 PG (ref 26–34)
MCHC RBC AUTO-ENTMCNC: 33.5 G/DL (ref 31–36)
MCV RBC AUTO: 94.8 FL (ref 80–100)
MONOCYTES ABSOLUTE: 0.6 K/UL (ref 0–1.3)
MONOCYTES RELATIVE PERCENT: 11.4 %
NEUTROPHILS ABSOLUTE: 2.3 K/UL (ref 1.7–7.7)
NEUTROPHILS RELATIVE PERCENT: 43.1 %
PDW BLD-RTO: 14.6 % (ref 12.4–15.4)
PLATELET # BLD: 210 K/UL (ref 135–450)
PMV BLD AUTO: 8.5 FL (ref 5–10.5)
POTASSIUM SERPL-SCNC: 4.9 MMOL/L (ref 3.5–5.1)
PROSTATE SPECIFIC ANTIGEN: 0.04 NG/ML (ref 0–4)
RBC # BLD: 3.36 M/UL (ref 4.2–5.9)
SODIUM BLD-SCNC: 140 MMOL/L (ref 136–145)
TOTAL PROTEIN: 7.1 G/DL (ref 6.4–8.2)
TRIGL SERPL-MCNC: 90 MG/DL (ref 0–150)
TSH SERPL DL<=0.05 MIU/L-ACNC: 2.54 UIU/ML (ref 0.27–4.2)
VLDLC SERPL CALC-MCNC: 18 MG/DL
WBC # BLD: 5.3 K/UL (ref 4–11)

## 2022-08-24 ENCOUNTER — NURSE ONLY (OUTPATIENT)
Dept: CARDIOLOGY CLINIC | Age: 87
End: 2022-08-24
Payer: MEDICARE

## 2022-08-24 DIAGNOSIS — Z95.0 CARDIAC PACEMAKER IN SITU: Chronic | ICD-10-CM

## 2022-08-24 DIAGNOSIS — R00.1 SYMPTOMATIC BRADYCARDIA: Primary | ICD-10-CM

## 2022-08-24 PROCEDURE — 93294 REM INTERROG EVL PM/LDLS PM: CPT | Performed by: INTERNAL MEDICINE

## 2022-08-24 PROCEDURE — 93296 REM INTERROG EVL PM/IDS: CPT | Performed by: INTERNAL MEDICINE

## 2022-08-24 NOTE — PROGRESS NOTES
Remote transmission received from patient's dual chamber pacemaker monitor at home. Transmission shows normal sensing and pacing function. No arrhythmias/ or events. EP physician will review. See interrogation under cardiology tab in the [address] [place] [address] [address] field for more details. Will continue to monitor remotely. (End of 91-day monitoring period 8/24/22).

## 2022-10-01 ENCOUNTER — HOSPITAL ENCOUNTER (EMERGENCY)
Age: 87
Discharge: HOME | End: 2022-10-01
Payer: MEDICARE

## 2022-10-01 VITALS
OXYGEN SATURATION: 98 % | SYSTOLIC BLOOD PRESSURE: 124 MMHG | TEMPERATURE: 98.5 F | RESPIRATION RATE: 16 BRPM | HEART RATE: 73 BPM | BODY MASS INDEX: 21.52 KG/M2 | DIASTOLIC BLOOD PRESSURE: 60 MMHG | WEIGHT: 145.28 LBS | HEIGHT: 69 IN

## 2022-10-01 DIAGNOSIS — R04.0 EPISTAXIS: Primary | ICD-10-CM

## 2022-10-01 DIAGNOSIS — Z79.01 ANTICOAGULATED: ICD-10-CM

## 2022-10-01 PROCEDURE — 6370000000 HC RX 637 (ALT 250 FOR IP): Performed by: PHYSICIAN ASSISTANT

## 2022-10-01 PROCEDURE — 99283 EMERGENCY DEPT VISIT LOW MDM: CPT

## 2022-10-01 RX ORDER — SODIUM CHLORIDE/ALOE VERA
GEL (GRAM) NASAL
Status: COMPLETED | OUTPATIENT
Start: 2022-10-01 | End: 2022-10-01

## 2022-10-01 RX ORDER — OXYMETAZOLINE HYDROCHLORIDE 0.05 G/100ML
2 SPRAY NASAL ONCE
Status: COMPLETED | OUTPATIENT
Start: 2022-10-01 | End: 2022-10-01

## 2022-10-01 RX ADMIN — Medication: at 18:14

## 2022-10-01 RX ADMIN — OXYMETAZOLINE HCL 2 SPRAY: 0.05 SPRAY NASAL at 18:16

## 2022-10-01 ASSESSMENT — PAIN - FUNCTIONAL ASSESSMENT
PAIN_FUNCTIONAL_ASSESSMENT: NONE - DENIES PAIN
PAIN_FUNCTIONAL_ASSESSMENT: NONE - DENIES PAIN

## 2022-10-01 NOTE — ED PROVIDER NOTES
**ADVANCED PRACTICE PROVIDER, I HAVE EVALUATED THIS PATIENT**        [address]      Pt Name: Velia Gonzalez  REK:[id number]  Ochoagfpennie 8/8/1926  Date of evaluation: 10/1/2022  Provider: Guero Tirado PA-C      Chief Complaint:    Chief Complaint   Patient presents with    Epistaxis     Pt arrives ambulatory for eval of nose bleed onset one week ago. Pt sts called pcp on Tuesday and told to stop eliquis , pt sts still bleeding, no bleeding noted during triage         Nursing Notes, Past Medical Hx, Past Surgical Hx, Social Hx, Allergies, and Family Hx were all reviewed and agreed with or any disagreements were addressed in the HPI.    HPI: (Location, Duration, Timing, Severity, Quality, Assoc Sx, Context, Modifying factors)    Chief Complaint of intermittent nosebleeds through the week and anticoagulated on Eliquis for A. fib    This is a  80 y.o. male who presents demonstrating a small amount of pinkish clear discharge that he blew into a white and Q-tip today. He indicates that he has had intermittent nosebleeding throughout the week and he did see his family doctor earlier this week, also confirmed by his wife. Patient was placed on some drops which patient's wife thinks might be related to or similar to [employer]. He also picked up some sort of drops with the equate brand name. Neither patient nor wife knows if this was for congestion or for the bleeding or to help with his membrane dryness. They do note that patient was post to be off of his Eliquis for about 5 days for the family doctor because of the bleeding but only skipped about 4 days before starting again. Patient states that when he does have the bleeding it never lasts very long but is just the pale pink that he blows out of his nose that may occur 2 or 3 times a day. He does not feel like there is any drainage on the back of his throat currently like blood.   No nausea or vomiting. No dizziness confusion syncope or near syncope weakness or general malaise. PastMedical/Surgical History:      Diagnosis Date    Cancer [facility])     Prostate    Hemorrhoids     Hyperlipidemia     Hypertension     Influenza A 12/29/2014    Shortness of breath          Procedure Laterality Date    CARPAL TUNNEL RELEASE Right 09/01/15    Right carpal tunnel release      CARPAL TUNNEL RELEASE Left 9/22/15    COLONOSCOPY N/A 4/8/2019    COLONOSCOPY DIAGNOSTIC performed by Diane Hernández MD at [address] [place] [address] Place      right wrist    ENDOSCOPY, COLON, DIAGNOSTIC      HERNIA REPAIR Right 1946    inguinal    UPPER GASTROINTESTINAL ENDOSCOPY      with dilatation       Medications:  Previous Medications    ALBUTEROL SULFATE  (90 BASE) MCG/ACT INHALER    Inhale 2 puffs into the lungs 4 times daily as needed for Wheezing    APIXABAN (ELIQUIS) 2.5 MG TABS TABLET    TAKE ONE TABLET BY MOUTH TWICE A DAY    CALCIUM CARBONATE (OSCAL) 500 MG TABS TABLET    Take 500 mg by mouth daily    DIETARY MANAGEMENT PRODUCT (SENTRA AM PO)    Take by mouth daily Indications: For men    LEUPROLIDE ACETATE, 6 MONTH, (ELIGARD) 45 MG INJECTION    Inject 45 mg into the skin every 6 months    MISC. DEVICES (WALKER) MISC    1 each by Does not apply route daily Dispense and Fit for injury to lower extremity and weakness. MULTIPLE MINERALS-VITAMINS (PROSTEON PO)    Take 2 tablets by mouth 2 times daily          Review of Systems:  (2-9 systems needed)  Review of Systems  No recent trauma to the nose or any medicines being taken for allergies or cold type symptoms according to patient however he does not know what his nose drops actually are or were being taken for. Patient denies any headache, no vision change or earache. No sore throat or difficulty swallowing. No nausea vomiting chest pain shortness of breath or extremity acute change. No rash. No active current bleeding.   Next when the nosebleed occurs it may be a little bit worse out of the right nostril than the left. \"Positives and Pertinent negatives as per HPI\"    Physical Exam:  Physical Exam  Vitals and nursing note reviewed. Constitutional:       Appearance: Normal appearance. He is not ill-appearing, toxic-appearing or diaphoretic. HENT:      Head: Normocephalic and atraumatic. Right Ear: External ear normal.      Left Ear: External ear normal.      Nose: Nose normal. No congestion or rhinorrhea. Comments: A very few small specks of dried blood noted in the right nostril with no active bleeding right or left or in the posterior oropharynx. Mouth/Throat:      Mouth: Mucous membranes are moist.      Pharynx: No posterior oropharyngeal erythema. Eyes:      General:         Right eye: No discharge. Left eye: No discharge. Conjunctiva/sclera: Conjunctivae normal.   Cardiovascular:      Rate and Rhythm: Normal rate and regular rhythm. Pulses: Normal pulses. Heart sounds: Normal heart sounds. No murmur heard. No gallop. Pulmonary:      Effort: Pulmonary effort is normal. No respiratory distress. Breath sounds: Normal breath sounds. No wheezing, rhonchi or rales. Musculoskeletal:      Cervical back: Normal range of motion and neck supple. No rigidity or tenderness. Lymphadenopathy:      Cervical: No cervical adenopathy. Skin:     General: Skin is warm and dry. Neurological:      Mental Status: He is alert and oriented to person, place, and time.    Psychiatric:         Mood and Affect: Mood normal.         Behavior: Behavior normal.       MEDICAL DECISION MAKING    Vitals:    Vitals:    10/01/22 1727   BP: 124/60   Pulse: 73   Resp: 16   Temp: 98.5 °F (36.9 °C)   TempSrc: Oral   SpO2: 98%   Weight: 145 lb 4.5 oz (65.9 kg)   Height: 5' 9\" (1.753 m)       LABS:Labs Reviewed - No data to display       RADIOLOGY:   Non-plain film images such as CT, Ultrasound and MRI are read by the radiologist. Tano Pinedo PA-C have directly visualized the radiologic plain film image(s) with the below findings:      Interpretation per the Radiologist below, if available at the time of this note:    No orders to display        No results found. MEDICAL DECISION MAKING / ED COURSE:      PROCEDURES:   Procedures    None    Patient was given:  Medications   oxymetazoline (AFRIN) 0.05 % nasal spray 2 spray (2 sprays Each Nostril Given 10/1/22 1816)   saline nasal gel (AYR) ( Nasal Given 10/1/22 1814)     This patient presents as above and evaluation and treatment is begun here. He is observed for a while. He does not have any nosebleed while he is present in the emergency department. Electronic medical record reviewed and is helpful. Patient is on Eliquis. Since patient may have been on a medicine similar to or related to [address] which is a vasoconstrictor and can lead to progressive thinning and additional bleeding from his nose membranes, it is discussed with patient and his wife that he needs to stop any of the nasal sprays he is using currently. I did order up from pharmacy a nasal gel. He is instructed as well as his wife on how to use this several times during the day over the next few days. Also we will discharge him home with that gel and a nasal clamp and instructions for use in case bleeding becomes brisk or more consistent. Instructions for follow-up as well as for emergent return are discussed and they verbalized understanding and agreement with the above and the following discharge home plan. Please switch to the nasal gel, 1 spray in each nostril 3 or 4 times daily over the next few days and stop the other nasal sprays. May use the nasal clamp for up to 10 minutes if a brisk nosebleed begins. If it lasts longer that that or you become more concerned please return directly to the emergency department for further care and treatment. The patient tolerated their visit well. I evaluated the patient.   The physician was available for consultation as needed. The patient and / or the family were informed of the results of any tests, a time was given to answer questions, a plan was proposed and they agreed with plan. I am the Primary Clinician of Record. CLINICAL IMPRESSION:  1. Epistaxis    2.  Anticoagulated        DISPOSITION Decision To Discharge 10/01/2022 06:27:20 PM      PATIENT REFERRED TO:  Dwight Amaya MD  [address] [address]  [phone]    Go to   After the weekend for further care and treatment if needed      DISCHARGE MEDICATIONS:  New Prescriptions    No medications on file       DISCONTINUED MEDICATIONS:  Discontinued Medications    No medications on file              (Please note the MDM and HPI sections of this note were completed with a voice recognition program.  Efforts were made to edit the dictations but occasionally words are mis-transcribed.)    Electronically signed, Jessica Barcenas PA-C,          Jessica Barcenas PA-C  10/01/22 1929

## 2022-10-01 NOTE — ED TRIAGE NOTES
Pt arrives ambulatory for eval of nose bleed onset one week ago. Pt sts called pcp on Tuesday and told to stop eliquis , pt sts still bleeding, no bleeding noted during triage. Pt is a/ox4, resp nonlabored and pwd.

## 2022-10-01 NOTE — ED NOTES
D/C: Order noted for d/c. Pt confirmed d/c paperwork have correct name. Discharge and education instructions reviewed with patient. Teach-back successful. Pt verbalized understanding and signed d/c papers. Pt denied questions at this time. No acute distress noted. Patient instructed to follow-up as noted - return to emergency department if symptoms worsen. Patient verbalized understanding. Discharged per EDMD with discharge instructions. Pt discharged to private vehicle. Patient stable upon departure. Thanked patient for choosing [facility]) for care. Provider aware of patient pain at time of discharge.        Roseline Jacobsen RN  10/01/22 8173

## 2022-10-01 NOTE — DISCHARGE INSTRUCTIONS
Please switch to the nasal gel, 1 spray in each nostril 3 or 4 times daily over the next few days and stop the other nasal sprays. May use the nasal clamp for up to 10 minutes if a brisk nosebleed begins. If it lasts longer that that or you become more concerned please return directly to the emergency department for further care and treatment.

## 2022-10-06 ENCOUNTER — OFFICE VISIT (OUTPATIENT)
Dept: ENT CLINIC | Age: 87
End: 2022-10-06
Payer: MEDICARE

## 2022-10-06 VITALS
DIASTOLIC BLOOD PRESSURE: 75 MMHG | BODY MASS INDEX: 21.92 KG/M2 | HEART RATE: 80 BPM | HEIGHT: 69 IN | SYSTOLIC BLOOD PRESSURE: 164 MMHG | WEIGHT: 148 LBS

## 2022-10-06 DIAGNOSIS — R04.0 EPISTAXIS: Primary | ICD-10-CM

## 2022-10-06 DIAGNOSIS — Z92.29 HX OF LONG TERM USE OF BLOOD THINNERS: ICD-10-CM

## 2022-10-06 PROCEDURE — 1123F ACP DISCUSS/DSCN MKR DOCD: CPT | Performed by: OTOLARYNGOLOGY

## 2022-10-06 PROCEDURE — 31231 NASAL ENDOSCOPY DX: CPT | Performed by: OTOLARYNGOLOGY

## 2022-10-06 PROCEDURE — G8484 FLU IMMUNIZE NO ADMIN: HCPCS | Performed by: OTOLARYNGOLOGY

## 2022-10-06 PROCEDURE — G8420 CALC BMI NORM PARAMETERS: HCPCS | Performed by: OTOLARYNGOLOGY

## 2022-10-06 PROCEDURE — 1036F TOBACCO NON-USER: CPT | Performed by: OTOLARYNGOLOGY

## 2022-10-06 PROCEDURE — G8427 DOCREV CUR MEDS BY ELIG CLIN: HCPCS | Performed by: OTOLARYNGOLOGY

## 2022-10-06 PROCEDURE — 99203 OFFICE O/P NEW LOW 30 MIN: CPT | Performed by: OTOLARYNGOLOGY

## 2022-10-06 NOTE — PROGRESS NOTES
[facility]      Patient Name: [address] Record Number:  [id number]  Primary Care Physician:  Vilma Darby MD  Date of Consultation: 10/6/2022    Chief Complaint: [address] is a(n) 80 y.o. male who presents for evaluation of nosebleeds. The patient says he has been having intermittent right-sided nosebleeds for about the past week. His wife says that really he has not had any persistent drainage that is bloody from the nose, but rather has blood when he blows his nose. He is on Eliquis. Reportedly did stop it for a while and still had the occasional bleeding. He has not had any other issues with bleeding. No nasal trauma. REVIEW OF SYSTEMS  As above    PHYSICAL EXAM  GENERAL: No Acute Distress, Alert and Oriented, no Hoarseness, strong voice  EYES: EOMI, Anti-icteric  HENT:   Head: Normocephalic and atraumatic. Face:  Symmetric, facial nerve intact, no sinus tenderness  Right Ear: Normal external ear  Left Ear: Normal external ear,  Mouth/Oral Cavity:  normal lips, Uvula is midline, no mucosal lesions, no trismus,  Oropharynx/Larynx:  normal oropharynx,  Nose:Normal external nasal appearance. See below  NECK: Normal range of motion, no thyromegaly, trachea is midline, no lymphadenopathy, no neck masses, no crepitus      Procedure  Nasal endoscopy  Afrin and lidocaine were applied the bilateral nasal cavity  A rigid scope was used to visualize left nasal cavity. No polyps or purulent drainage noted. No evidence of recent bleeding. On the right side there is a little bit excoriations of the anterior septum without any evidence of bleeding. More posteriorly I do not appreciate any masses or lesions that would have been a source of bleeding. ASSESSMENT/PLAN  1. Epistaxis  Based on the description this is more blood specks when blowing his nose.   This is probably just secondary to nasal dryness and the blood thinner use. I told him that a little bit of specks and blowing the nose is not overly concerning. I do think that he should continue to use the nasal saline to keep things moist.  I told him to follow-up with me if he has a bad nosebleed. 2. Hx of long term use of blood thinners  I think it is okay to restart his Eliquis if he is not using it. I have performed a head and neck physical exam personally or was physically present during the key or critical portions of the service. This note was generated completely or in part utilizing Dragon dictation speech recognition software. Occasionally, words are mistranscribed and despite editing, the text may contain inaccuracies due to incorrect word recognition. If further clarification is needed please contact the office at [phone].

## 2022-10-20 NOTE — PROGRESS NOTES
Mr. Ansley Green is a 80 y.o. right handed man  who is seen today in Hand Surgical Consultation at the request of Pamela Arredondo MD.    He is seen today regarding a 1 month(s) history of right wrist pain without previous  history of injury. He was not seen for this concern by his primary care physician; previous treatment has included conservative measures. He reports mild pain located in the Radial and Dorsal wrist, no tenderness of the remaining hand or elbow. He  notes today, mild baseline neurologic symptoms in the hand. Symptoms are improving over time. I have today reviewed with Ansley Green the clinically relevant, past medical history, medications, allergies,  family history, social history, and Review Of Systems & I have documented any details relevant to today's presenting complaints in my history above. Mr. Ansley Green self-reported past medical history, medications, allergies,  family history, social history, and Review Of Systems have been scanned into the chart under the \"Media\" tab. Physical Exam:  Mr. Ansley Green most recent vitals:  Vitals  Resp: 16  Height: 5' 9\" (175.3 cm)  Weight: 150 lb (68 kg)    He is well nourished, oriented to person, place & time. He demonstrates appropriate mood and affect as well as normal gait and station. 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 All other digits are normally sensate bilaterally  Vascular examination reveals normal, good capillary refill and good color bilaterally. There is no ecchymosis. Swelling is mild in the dorso-radial wrist on the Right, normal on the Left. No other digit or hand bilaterally shows sign of swelling.   There is no evidence of gross joint instability What Type Of Note Output Would You Prefer (Optional)?: Standard Output Hpi Title: Evaluation of Skin Lesions bilaterally. Muscular strength is clinically appropriate bilaterally. Maximal pain is elicited with palpation of the Radial and Dorsal aspect of the wrist, specifically at the scapholunate interval and at the radio-scaphoid joint. The base of the hand & ulnar side of the wrist are not tender to palpation. There is not clinical evidence of skeletal deformity or mal-rotation. Radiographic Evaluation:  Radiographs, taken In My Office were Personally Reviewed & Interpreted by myself today (3 views of the right wrist). They demonstrate no evidence of an acute fracture of the distal radius, ulna, or carpal bones. There is moderate widening of the scapho-lunate interval &  mild flexion of the scaphoid. The scaphoid does not show evidence of non-union; the scaphoid does not show evidence of avascular necrosis. There is severe evidence of degenerative change at the Radio-carpal Joint(s). There is not evidence of other injury or bony fracture. Impression:  Mr. 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 Plan:  I have had a thorough discussion with Mr. 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 I have outlined for Mr. Lien Zarco the risk, benefits and consequences of the various treatment modalities, including a reasonable expectation for the long term success of each. We have discussed the likelihood that further, more aggressive treatment may be required for his current presenting condition.   Based upon our current discussion and a reasonable understating of the options available to him, Mr. Lien Zarco How Severe Are Your Spot(S)?: moderate has selected to proceed with a conservative plan of treatment consisting of: the use of protective splints, activity modification, and the judicious use of over-the-counter anti-inflammatory medications if allowed by his primary care physician. An appropriately sized Removable forearm based Wrist orthosis was offered and declined. Instructions were given regarding splint use and wear as well as suggestions for use of the other modalities were discussed. I have clearly explained to him that the above outlined treatment plan should not be expected to 'cure' his  SLAC Wrist deformity and osteoarthritis, but we are rather treating the symptoms with which he presents. He has understood that in order to achieve more durable relief of his symptoms and to prevent future worsening or further damage, that definitive surgical treatment would be required. Mr. Jennie Capone  voiced an appropriate understanding of our discussion, the options available to him, and of the expectations of his selected  treatment. I have also discussed with Mr. Jennie Capone  the other treatment options available to him  for this condition. We have today selected to proceed with conservative management. He and I have agreed that if our current course of conservative treatment does not prove to be effective over the short term future, that he will schedule a follow-up appointment to discuss and select an alternate course of therapy including possibly injection or surgical treatment. Mr. Jennie Capone has been given a full verbal list of instructions and precautions related to his present condition. I have asked him to followup with me in the office at the prescribed time. He is also specifically requested to call or return to the office sooner if his symptoms change or worsen prior to the next scheduled appointment. Have Your Spot(S) Been Treated In The Past?: has not been treated

## 2022-12-07 ENCOUNTER — NURSE ONLY (OUTPATIENT)
Dept: CARDIOLOGY CLINIC | Age: 87
End: 2022-12-07

## 2022-12-07 DIAGNOSIS — R00.1 SYMPTOMATIC BRADYCARDIA: ICD-10-CM

## 2022-12-07 DIAGNOSIS — Z95.0 CARDIAC PACEMAKER IN SITU: Primary | Chronic | ICD-10-CM

## 2022-12-09 NOTE — PROGRESS NOTES
Remote transmission received from patient's dual chamber pacemaker monitor at home. Transmission shows normal sensing and pacing function. <0.1% AT and NSVT noted (eliquis, doesn't appear to be on a beta blocker). EP physician will review. See interrogation under cardiology tab in the [address] [place] [address] [address] field for more details. Will continue to monitor remotely.      (End of 91-day monitoring period 12/7/22)

## 2023-01-13 NOTE — PROGRESS NOTES
Bedside shift change report given to Maureen (oncoming nurse) by Manuel Shaw (offgoing nurse). Report included the following information SBAR, Kardex, Intake/Output, MAR and Recent Results. [facility]   Electrophysiology      Date: 1/16/2023    Primary Cardiologist: Beatrice Barron MD  PCP: Naren Abdi MD     Chief Complaint:   Chief Complaint   Patient presents with    Follow-up    Device Check     History of Present Illness:    I saw Era Boswell in the office for electrophysiology follow up today. He is a 80 y.o. male with a past medical history of atrial fibrillation, syncope s/p ILR, prostate cancer and hypertension. He was noted to have symptomatic sinus pauses in September 2021 on ILR check. He underwent implantation of a Medtronic dual chamber pacemaker on 10/14/21 with Dr. Alvarez Kim. He presents today for yearly device follow up. He has been doing well over this last year. Denies any further syncope. No chest pain or palpitations. No dyspnea. No edema. No bleeding issues or falling. Allergies:  No Known Allergies  Home Medications:  Prior to Visit Medications    Medication Sig Taking? Authorizing Provider   apixaban (ELIQUIS) 5 MG TABS tablet Take 1 tablet by mouth 2 times daily TAKE ONE TABLET BY MOUTH TWICE A DAY Yes VALDEMAR Farley - CNP   calcium carbonate (OSCAL) 500 MG TABS tablet Take 500 mg by mouth daily Yes Historical Provider, MD   Misc. Devices (WALKER) MISC 1 each by Does not apply route daily Dispense and Fit for injury to lower extremity and weakness. Yes Blaze Ramon PA-C   Dietary Management Product (SENTRA AM PO) Take by mouth daily Indications:  For men Yes Historical Provider, MD   leuprolide acetate, 6 Month, (ELIGARD) 45 MG injection Inject 45 mg into the skin every 6 months Yes Historical Provider, MD   albuterol sulfate  (90 Base) MCG/ACT inhaler Inhale 2 puffs into the lungs 4 times daily as needed for Wheezing Yes Gemini Nam MD   Multiple Minerals-Vitamins (PROSTEON PO) Take 2 tablets by mouth 2 times daily  Yes Historical Provider, MD        Past Medical History:  Past Medical History:   Diagnosis Date    Cancer ([address] Prostate    Hemorrhoids     Hyperlipidemia     Hypertension     Influenza A 12/29/2014    Shortness of breath        Past Surgical History:   Past Surgical History:   Procedure Laterality Date    CARPAL TUNNEL RELEASE Right 09/01/15    Right carpal tunnel release      CARPAL TUNNEL RELEASE Left 9/22/15    COLONOSCOPY N/A 4/8/2019    COLONOSCOPY DIAGNOSTIC performed by Amy Murillo MD at [address] [place] [address]      right wrist    ENDOSCOPY, COLON, DIAGNOSTIC      HERNIA REPAIR Right 1946    inguinal    UPPER GASTROINTESTINAL ENDOSCOPY      with dilatation       Social History:   reports that he quit smoking about 67 years ago. His smoking use included cigarettes. He has never used smokeless tobacco. He reports that he does not drink alcohol and does not use drugs. Family History:      Problem Relation Age of Onset    Hypertension Other     Stroke Other        Review of Systems   Constitutional:  Negative for chills, fatigue, fever and unexpected weight change. HENT:  Negative for congestion, hearing loss, sinus pressure, sore throat and trouble swallowing. Respiratory:  Negative for cough, shortness of breath and wheezing. Cardiovascular:  Negative for chest pain, palpitations and leg swelling. Gastrointestinal:  Negative for abdominal pain, blood in stool, constipation, diarrhea, nausea and vomiting. Genitourinary:  Negative for hematuria. Musculoskeletal:  Negative for arthralgias, back pain, gait problem and myalgias. Skin:  Negative for color change, rash and wound. Neurological:  Negative for dizziness, seizures, syncope, speech difficulty, weakness and light-headedness. Hematological:  Does not bruise/bleed easily. Physical Examination:  Vitals:    01/16/23 1334   BP: 136/60   Pulse: 79   SpO2: 98%      Wt Readings from Last 3 Encounters:   01/16/23 149 lb (67.6 kg)   10/06/22 148 lb (67.1 kg)   10/01/22 145 lb 4.5 oz (65.9 kg)       Physical Exam  Vitals reviewed. Constitutional:       General: He is not in acute distress. Appearance: Normal appearance. HENT:      Head: Normocephalic and atraumatic. Nose: Nose normal.      Mouth/Throat:      Mouth: Mucous membranes are moist.   Eyes:      Conjunctiva/sclera: Conjunctivae normal.      Pupils: Pupils are equal, round, and reactive to light. Cardiovascular:      Rate and Rhythm: Normal rate and regular rhythm. Heart sounds: No murmur heard. No friction rub. No gallop. Pulmonary:      Effort: No respiratory distress. Breath sounds: No wheezing, rhonchi or rales. Abdominal:      General: Abdomen is flat. Bowel sounds are normal.      Palpations: Abdomen is soft. Musculoskeletal:         General: Normal range of motion. Right lower leg: No edema. Left lower leg: No edema. Skin:     General: Skin is warm and dry. Findings: No bruising. Neurological:      General: No focal deficit present. Mental Status: He is alert and oriented to person, place, and time. Motor: No weakness. Psychiatric:         Mood and Affect: Mood normal.         Behavior: Behavior normal.        Pertinent labs, diagnostic, device, and imaging results reviewed as a part of this visit    LABS    CBC:   Lab Results   Component Value Date    WBC 5.3 08/03/2022    HGB 10.7 (L) 08/03/2022    HCT 31.9 (L) 08/03/2022    MCV 94.8 08/03/2022     08/03/2022     BMP:   Lab Results   Component Value Date    CREATININE 1.3 08/03/2022    BUN 19 08/03/2022     08/03/2022    K 4.9 08/03/2022     08/03/2022    CO2 27 08/03/2022     Estimated Creatinine Clearance: 32 mL/min (based on SCr of 1.3 mg/dL).    No results found for: BNP    Thyroid:   Lab Results   Component Value Date    TSH 2.54 08/03/2022     Lipid Panel:   Lab Results   Component Value Date/Time    CHOL 245 08/03/2022 10:31 AM    HDL 83 08/03/2022 10:31 AM    HDL 69 03/05/2012 09:49 AM    TRIG 90 08/03/2022 10:31 AM     LFTs:  Lab Results   Component Value Date    ALT 11 2022    AST 23 2022    ALKPHOS 65 2022    BILITOT <0.2 2022     Coags: No results found for: PROTIME, INR, APTT    EC2023   SR at 75 BPM. Prolonged KS interval.    Echo: 18   Left ventricular cavity size is normal. There is moderate asymmetric   hypertrophy of the basal septum. Overall left ventricular systolic function   appears hyperdynamic. Ejection fraction is visually estimated to be >70%. No   regional wall motion abnormalities are noted. There is a late peaking   gradient recorded across the LV outflow tract with a peak gradient of   52mmHg. with Valsalva maneuver. Mitral annular calcification is present. Mitral valve leaflets appear mildly thickened. systolic ant motion of anterior mitral leaflet. Moderate mitral regurgitation. Aortic valve appears sclerotic but appears to open adequately. Mild tricuspid regurgitation. Stress test: 17  Normal myocardial perfusion study. Normal LV size and systolic function. Uncontrolled hypertension. Overall findings represent a low risk study. EP Procedures:  1. ILR implant, 20, Dr. Rex Estrada  2. Medtronic dual chamber PPM, ILR explant, 10/14/21, Dr. Drew Danielle interrogation: 2023   Normal device function. Battery life 14.2 years  V paced 2%%  A paced 0.9%  One AF episode for about 1.5 hours, seven brief NSVT.     Assessment & Plan:    SSS   - with symptomatic sinus pause seen on ILR    - s/p Medtronic dual chamber PPM implanted 10/14/21   - device interrogation as above, device with normal function   - continue with routine follow up with device clinic    Paroxysmal atrial fibrillation   - CHADS2-VASc 3 (age, HTN) on Eliquis 2.5mg BID - should be on 5mg BID as last creatinine was 1.3 and weight is 67kg   - not an ablation candidate due to age   - device interrogation today with <0.1% burden    - if having increasing burden, can consider AAD    Thank you for allowing to us to participate in the care of Aditi Davies.    Return in about 1 year (around 1/16/2024) for an appointment with Kenna Hernandez NP.      VALDEMAR Heck  [address]  Phone: [phone]  Fax: [phone]    Electronically signed by VALDEMAR Martínez CNP on 1/16/2023 at 2:07 PM

## 2023-01-16 ENCOUNTER — NURSE ONLY (OUTPATIENT)
Dept: CARDIOLOGY CLINIC | Age: 88
End: 2023-01-16
Payer: MEDICARE

## 2023-01-16 ENCOUNTER — OFFICE VISIT (OUTPATIENT)
Dept: CARDIOLOGY CLINIC | Age: 88
End: 2023-01-16
Payer: MEDICARE

## 2023-01-16 VITALS
DIASTOLIC BLOOD PRESSURE: 60 MMHG | SYSTOLIC BLOOD PRESSURE: 136 MMHG | OXYGEN SATURATION: 98 % | BODY MASS INDEX: 22.07 KG/M2 | WEIGHT: 149 LBS | HEART RATE: 79 BPM | HEIGHT: 69 IN

## 2023-01-16 DIAGNOSIS — I48.0 PAROXYSMAL ATRIAL FIBRILLATION (HCC): ICD-10-CM

## 2023-01-16 DIAGNOSIS — Z95.0 CARDIAC PACEMAKER IN SITU: ICD-10-CM

## 2023-01-16 DIAGNOSIS — I49.5 SSS (SICK SINUS SYNDROME) (HCC): Primary | ICD-10-CM

## 2023-01-16 DIAGNOSIS — R55 SYNCOPE AND COLLAPSE: ICD-10-CM

## 2023-01-16 DIAGNOSIS — R00.1 SYMPTOMATIC BRADYCARDIA: ICD-10-CM

## 2023-01-16 DIAGNOSIS — Z95.0 CARDIAC PACEMAKER IN SITU: Primary | Chronic | ICD-10-CM

## 2023-01-16 PROCEDURE — 1123F ACP DISCUSS/DSCN MKR DOCD: CPT | Performed by: NURSE PRACTITIONER

## 2023-01-16 PROCEDURE — G8427 DOCREV CUR MEDS BY ELIG CLIN: HCPCS | Performed by: NURSE PRACTITIONER

## 2023-01-16 PROCEDURE — 99214 OFFICE O/P EST MOD 30 MIN: CPT | Performed by: NURSE PRACTITIONER

## 2023-01-16 PROCEDURE — G8484 FLU IMMUNIZE NO ADMIN: HCPCS | Performed by: NURSE PRACTITIONER

## 2023-01-16 PROCEDURE — G8420 CALC BMI NORM PARAMETERS: HCPCS | Performed by: NURSE PRACTITIONER

## 2023-01-16 PROCEDURE — 93280 PM DEVICE PROGR EVAL DUAL: CPT | Performed by: INTERNAL MEDICINE

## 2023-01-16 PROCEDURE — 1036F TOBACCO NON-USER: CPT | Performed by: NURSE PRACTITIONER

## 2023-01-16 ASSESSMENT — ENCOUNTER SYMPTOMS
ABDOMINAL PAIN: 0
SORE THROAT: 0
TROUBLE SWALLOWING: 0
WHEEZING: 0
NAUSEA: 0
CONSTIPATION: 0
DIARRHEA: 0
BACK PAIN: 0
VOMITING: 0
BLOOD IN STOOL: 0
COUGH: 0
COLOR CHANGE: 0
SHORTNESS OF BREATH: 0
SINUS PRESSURE: 0

## 2023-01-16 NOTE — PROGRESS NOTES
Patient comes in for programming evaluation on their Medtronic [id number] [place] DC PPM.    Last remote on 12/7/22    All sensing and pacing parameters are within normal range.  Battery life 14.2y  P- AsVs @ 80 bpm  AP 2%.   0.9%.  AT/AF burden <0.1%  PVC 9.5/hr  1 AT/AF episode on 11/06/22 x 1hr 22 min 6 sec  7 NSVT episodes, last on 12/21/22, longest 3 sec  Patient remains on Eliquis  Updated time. Please see interrogation for more detail.    Patient will see NPCP today and follow up in 3 months in office or remotely.

## 2023-02-08 ENCOUNTER — HOSPITAL ENCOUNTER (OUTPATIENT)
Dept: GENERAL RADIOLOGY | Age: 88
Discharge: HOME | End: 2023-02-08
Payer: MEDICARE

## 2023-02-08 ENCOUNTER — HOSPITAL ENCOUNTER (OUTPATIENT)
Age: 88
Discharge: HOME | End: 2023-02-08
Payer: MEDICARE

## 2023-02-08 DIAGNOSIS — R10.10 ACUTE UPPER ABDOMINAL PAIN: ICD-10-CM

## 2023-02-08 DIAGNOSIS — R07.2 CARDIAC PAIN: ICD-10-CM

## 2023-02-08 PROCEDURE — 71046 X-RAY EXAM CHEST 2 VIEWS: CPT

## 2023-02-08 PROCEDURE — 74018 RADEX ABDOMEN 1 VIEW: CPT

## 2023-03-15 ENCOUNTER — NURSE ONLY (OUTPATIENT)
Dept: CARDIOLOGY CLINIC | Age: 88
End: 2023-03-15

## 2023-03-15 DIAGNOSIS — R00.1 SYMPTOMATIC BRADYCARDIA: ICD-10-CM

## 2023-03-15 DIAGNOSIS — Z95.0 CARDIAC PACEMAKER IN SITU: Primary | Chronic | ICD-10-CM

## 2023-03-17 NOTE — PROGRESS NOTES
Remote transmission received from patient's dual chamber pacemaker monitor at home. Transmission shows normal sensing and pacing function. No arrhythmias/ or events. EP physician will review. See interrogation under cardiology tab in the [address] [place] [address] [address] field for more details. Will continue to monitor remotely.      (End of 91-day monitoring period 3/15/23)

## 2023-06-21 ENCOUNTER — NURSE ONLY (OUTPATIENT)
Dept: CARDIOLOGY CLINIC | Age: 88
End: 2023-06-21
Payer: MEDICARE

## 2023-06-21 DIAGNOSIS — R00.1 SYMPTOMATIC BRADYCARDIA: ICD-10-CM

## 2023-06-21 DIAGNOSIS — Z95.0 CARDIAC PACEMAKER IN SITU: Primary | Chronic | ICD-10-CM

## 2023-06-21 PROCEDURE — 93294 REM INTERROG EVL PM/LDLS PM: CPT | Performed by: INTERNAL MEDICINE

## 2023-06-21 PROCEDURE — 93296 REM INTERROG EVL PM/IDS: CPT | Performed by: INTERNAL MEDICINE

## 2023-06-21 NOTE — PROGRESS NOTES
Remote transmission received from patient's dual chamber pacemaker monitor at home. Transmission shows normal sensing and pacing function. Previously decreased Rwave noted on trend w Rwaves back up to 13.0mV. Noted NSVT (Eliquis, no BB; 89yo). Ap 6.5%   5.1% (MVP On)  PVCs 12.9/hr    End of 91-day monitoring period 6/21/23. EP physician will review. See interrogation under cardiology tab in the [address] [place] [address] [address] field for more details. Will continue to monitor remotely.

## 2023-07-07 ENCOUNTER — TELEPHONE (OUTPATIENT)
Dept: CARDIOLOGY CLINIC | Age: 88
End: 2023-07-07

## 2023-07-07 NOTE — TELEPHONE ENCOUNTER
Wife and pt reports frequent falls and epistaxis  Per Dr Gena Fry, decrease Eliquis from 5 mg to 2.5 mg   May refer to dr Larissa Grande if further indicated

## 2023-07-12 ENCOUNTER — APPOINTMENT (OUTPATIENT)
Dept: GENERAL RADIOLOGY | Age: 88
End: 2023-07-12
Payer: MEDICARE

## 2023-07-12 ENCOUNTER — APPOINTMENT (OUTPATIENT)
Dept: CT IMAGING | Age: 88
End: 2023-07-12
Payer: MEDICARE

## 2023-07-12 ENCOUNTER — HOSPITAL ENCOUNTER (OUTPATIENT)
Age: 88
Setting detail: OBSERVATION
Discharge: HOME | End: 2023-07-13
Attending: INTERNAL MEDICINE | Admitting: INTERNAL MEDICINE
Payer: MEDICARE
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
## 2023-07-12 DIAGNOSIS — N17.9 AKI (ACUTE KIDNEY INJURY) (HCC): ICD-10-CM

## 2023-07-12 DIAGNOSIS — R77.8 ELEVATED TROPONIN: ICD-10-CM

## 2023-07-12 DIAGNOSIS — R42 LIGHTHEADEDNESS: ICD-10-CM

## 2023-07-12 DIAGNOSIS — S09.90XA INJURY OF HEAD, INITIAL ENCOUNTER: Primary | ICD-10-CM

## 2023-07-12 PROBLEM — R79.89 TROPONIN I ABOVE REFERENCE RANGE: Status: ACTIVE | Noted: 2023-07-12
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
## 2023-07-12 LAB
ANION GAP SERPL CALCULATED.3IONS-SCNC: 10 MMOL/L (ref 3–16)
BACTERIA URNS QL MICRO: NORMAL /HPF
BASOPHILS # BLD: 0 K/UL (ref 0–0.2)
BASOPHILS NFR BLD: 0.7 %
BILIRUB UR QL STRIP.AUTO: NEGATIVE
BUN SERPL-MCNC: 23 MG/DL (ref 7–20)
CALCIUM SERPL-MCNC: 9.6 MG/DL (ref 8.3–10.6)
CHARACTER UR: NORMAL
CHLORIDE SERPL-SCNC: 101 MMOL/L (ref 99–110)
CLARITY UR: CLEAR
CO2 SERPL-SCNC: 27 MMOL/L (ref 21–32)
COLOR UR: YELLOW
CREAT SERPL-MCNC: 1.7 MG/DL (ref 0.8–1.3)
DEPRECATED RDW RBC AUTO: 14.3 % (ref 12.4–15.4)
EKG ATRIAL RATE: 76 BPM
EKG DIAGNOSIS: NORMAL
EKG P AXIS: 78 DEGREES
EKG P-R INTERVAL: 270 MS
EKG Q-T INTERVAL: 424 MS
EKG QRS DURATION: 82 MS
EKG QTC CALCULATION (BAZETT): 477 MS
EKG R AXIS: -33 DEGREES
EKG T AXIS: 219 DEGREES
EKG VENTRICULAR RATE: 76 BPM
EOSINOPHIL # BLD: 0 K/UL (ref 0–0.6)
EOSINOPHIL NFR BLD: 0.9 %
EPI CELLS #/AREA URNS AUTO: 0 /HPF (ref 0–5)
GFR SERPLBLD CREATININE-BSD FMLA CKD-EPI: 36 ML/MIN/{1.73_M2}
GLUCOSE SERPL-MCNC: 147 MG/DL (ref 70–99)
GLUCOSE UR STRIP.AUTO-MCNC: NEGATIVE MG/DL
HCT VFR BLD AUTO: 31.8 % (ref 40.5–52.5)
HGB BLD-MCNC: 10.9 G/DL (ref 13.5–17.5)
HGB UR QL STRIP.AUTO: NEGATIVE
HYALINE CASTS #/AREA URNS LPF: NORMAL /LPF (ref 0–2)
INR PPP: 1.05 (ref 0.84–1.16)
KETONES UR STRIP.AUTO-MCNC: NEGATIVE MG/DL
LEUKOCYTE ESTERASE UR QL STRIP.AUTO: NEGATIVE
LYMPHOCYTES # BLD: 1.4 K/UL (ref 1–5.1)
LYMPHOCYTES NFR BLD: 28.6 %
MCH RBC QN AUTO: 32.1 PG (ref 26–34)
MCHC RBC AUTO-ENTMCNC: 34.3 G/DL (ref 31–36)
MCV RBC AUTO: 93.7 FL (ref 80–100)
MONOCYTES # BLD: 0.4 K/UL (ref 0–1.3)
MONOCYTES NFR BLD: 9 %
NEUTROPHILS # BLD: 3 K/UL (ref 1.7–7.7)
NEUTROPHILS NFR BLD: 60.8 %
NITRITE UR QL STRIP.AUTO: NEGATIVE
PH UR STRIP.AUTO: 7 [PH] (ref 5–8)
PLATELET # BLD AUTO: 181 K/UL (ref 135–450)
PMV BLD AUTO: 8.4 FL (ref 5–10.5)
POTASSIUM SERPL-SCNC: 4.4 MMOL/L (ref 3.5–5.1)
PROT UR STRIP.AUTO-MCNC: 30 MG/DL
PROTHROMBIN TIME: 13.7 SEC (ref 11.5–14.8)
RBC # BLD AUTO: 3.4 M/UL (ref 4.2–5.9)
RBC #/AREA URNS HPF: NORMAL /HPF (ref 0–4)
REASON FOR REJECTION: NORMAL
REJECTED TEST: NORMAL
SODIUM SERPL-SCNC: 138 MMOL/L (ref 136–145)
SP GR UR STRIP.AUTO: 1.02 (ref 1–1.03)
TROPONIN, HIGH SENSITIVITY: 33 NG/L (ref 0–22)
TROPONIN, HIGH SENSITIVITY: 34 NG/L (ref 0–22)
UA COMPLETE W REFLEX CULTURE PNL UR: ABNORMAL
UA DIPSTICK W REFLEX MICRO PNL UR: YES
URN SPEC COLLECT METH UR: ABNORMAL
UROBILINOGEN UR STRIP-ACNC: 0.2 E.U./DL
WBC # BLD AUTO: 5 K/UL (ref 4–11)
WBC #/AREA URNS AUTO: 3 /HPF (ref 0–5)

## 2023-07-12 PROCEDURE — 85610 PROTHROMBIN TIME: CPT

## 2023-07-12 PROCEDURE — 84484 ASSAY OF TROPONIN QUANT: CPT

## 2023-07-12 PROCEDURE — G0378 HOSPITAL OBSERVATION PER HR: HCPCS

## 2023-07-12 PROCEDURE — 93010 ELECTROCARDIOGRAM REPORT: CPT | Performed by: INTERNAL MEDICINE

## 2023-07-12 PROCEDURE — 2580000003 HC RX 258: Performed by: INTERNAL MEDICINE

## 2023-07-12 PROCEDURE — 93005 ELECTROCARDIOGRAM TRACING: CPT | Performed by: NURSE PRACTITIONER

## 2023-07-12 PROCEDURE — 80048 BASIC METABOLIC PNL TOTAL CA: CPT

## 2023-07-12 PROCEDURE — 6360000002 HC RX W HCPCS: Performed by: NURSE PRACTITIONER

## 2023-07-12 PROCEDURE — 96360 HYDRATION IV INFUSION INIT: CPT

## 2023-07-12 PROCEDURE — 85025 COMPLETE CBC W/AUTO DIFF WBC: CPT

## 2023-07-12 PROCEDURE — 2580000003 HC RX 258: Performed by: NURSE PRACTITIONER

## 2023-07-12 PROCEDURE — 72125 CT NECK SPINE W/O DYE: CPT

## 2023-07-12 PROCEDURE — 6370000000 HC RX 637 (ALT 250 FOR IP): Performed by: INTERNAL MEDICINE

## 2023-07-12 PROCEDURE — 96361 HYDRATE IV INFUSION ADD-ON: CPT

## 2023-07-12 PROCEDURE — 90715 TDAP VACCINE 7 YRS/> IM: CPT | Performed by: NURSE PRACTITIONER

## 2023-07-12 PROCEDURE — 70450 CT HEAD/BRAIN W/O DYE: CPT

## 2023-07-12 PROCEDURE — 90471 IMMUNIZATION ADMIN: CPT | Performed by: NURSE PRACTITIONER

## 2023-07-12 PROCEDURE — 99285 EMERGENCY DEPT VISIT HI MDM: CPT

## 2023-07-12 PROCEDURE — 71045 X-RAY EXAM CHEST 1 VIEW: CPT

## 2023-07-12 PROCEDURE — 81001 URINALYSIS AUTO W/SCOPE: CPT

## 2023-07-12 RX ORDER — ONDANSETRON 4 MG/1
4 TABLET, ORALLY DISINTEGRATING ORAL EVERY 8 HOURS PRN
Status: DISCONTINUED | OUTPATIENT
Start: 2023-07-12 | End: 2023-07-13 | Stop reason: HOSPADM

## 2023-07-12 RX ORDER — TRAMADOL HYDROCHLORIDE 50 MG/1
50 TABLET ORAL EVERY 6 HOURS PRN
Status: DISCONTINUED | OUTPATIENT
Start: 2023-07-12 | End: 2023-07-13 | Stop reason: HOSPADM

## 2023-07-12 RX ORDER — SODIUM CHLORIDE 9 MG/ML
INJECTION, SOLUTION INTRAVENOUS PRN
Status: DISCONTINUED | OUTPATIENT
Start: 2023-07-12 | End: 2023-07-13 | Stop reason: HOSPADM

## 2023-07-12 RX ORDER — POLYETHYLENE GLYCOL 3350 17 G/17G
17 POWDER, FOR SOLUTION ORAL DAILY PRN
Status: DISCONTINUED | OUTPATIENT
Start: 2023-07-12 | End: 2023-07-13 | Stop reason: HOSPADM

## 2023-07-12 RX ORDER — ONDANSETRON 2 MG/ML
4 INJECTION INTRAMUSCULAR; INTRAVENOUS EVERY 6 HOURS PRN
Status: DISCONTINUED | OUTPATIENT
Start: 2023-07-12 | End: 2023-07-13 | Stop reason: HOSPADM

## 2023-07-12 RX ORDER — SODIUM CHLORIDE 0.9 % (FLUSH) 0.9 %
5-40 SYRINGE (ML) INJECTION PRN
Status: DISCONTINUED | OUTPATIENT
Start: 2023-07-12 | End: 2023-07-13 | Stop reason: HOSPADM

## 2023-07-12 RX ORDER — ACETAMINOPHEN 325 MG/1
650 TABLET ORAL EVERY 6 HOURS PRN
Status: DISCONTINUED | OUTPATIENT
Start: 2023-07-12 | End: 2023-07-13 | Stop reason: HOSPADM

## 2023-07-12 RX ORDER — SODIUM CHLORIDE 9 MG/ML
INJECTION, SOLUTION INTRAVENOUS ONCE
Status: COMPLETED | OUTPATIENT
Start: 2023-07-12 | End: 2023-07-13

## 2023-07-12 RX ORDER — SODIUM CHLORIDE 9 MG/ML
INJECTION, SOLUTION INTRAVENOUS CONTINUOUS
Status: DISCONTINUED | OUTPATIENT
Start: 2023-07-12 | End: 2023-07-13

## 2023-07-12 RX ORDER — ALBUTEROL SULFATE 90 UG/1
2 AEROSOL, METERED RESPIRATORY (INHALATION) EVERY 6 HOURS PRN
Status: DISCONTINUED | OUTPATIENT
Start: 2023-07-12 | End: 2023-07-13 | Stop reason: HOSPADM

## 2023-07-12 RX ORDER — ACETAMINOPHEN 650 MG/1
650 SUPPOSITORY RECTAL EVERY 6 HOURS PRN
Status: DISCONTINUED | OUTPATIENT
Start: 2023-07-12 | End: 2023-07-13 | Stop reason: HOSPADM

## 2023-07-12 RX ORDER — LANOLIN ALCOHOL/MO/W.PET/CERES
3 CREAM (GRAM) TOPICAL NIGHTLY PRN
Status: DISCONTINUED | OUTPATIENT
Start: 2023-07-12 | End: 2023-07-13 | Stop reason: HOSPADM

## 2023-07-12 RX ORDER — HYDRALAZINE HYDROCHLORIDE 10 MG/1
10 TABLET, FILM COATED ORAL EVERY 4 HOURS PRN
Status: DISCONTINUED | OUTPATIENT
Start: 2023-07-12 | End: 2023-07-13 | Stop reason: HOSPADM

## 2023-07-12 RX ORDER — SODIUM CHLORIDE 0.9 % (FLUSH) 0.9 %
5-40 SYRINGE (ML) INJECTION EVERY 12 HOURS SCHEDULED
Status: DISCONTINUED | OUTPATIENT
Start: 2023-07-12 | End: 2023-07-13 | Stop reason: HOSPADM

## 2023-07-12 RX ADMIN — SODIUM CHLORIDE: 9 INJECTION, SOLUTION INTRAVENOUS at 17:12

## 2023-07-12 RX ADMIN — SODIUM CHLORIDE: 9 INJECTION, SOLUTION INTRAVENOUS at 21:31

## 2023-07-12 RX ADMIN — TETANUS TOXOID, REDUCED DIPHTHERIA TOXOID AND ACELLULAR PERTUSSIS VACCINE, ADSORBED 0.5 ML: 5; 2.5; 8; 8; 2.5 SUSPENSION INTRAMUSCULAR at 16:23

## 2023-07-12 RX ADMIN — APIXABAN 2.5 MG: 2.5 TABLET, FILM COATED ORAL at 21:30

## 2023-07-12 ASSESSMENT — PAIN - FUNCTIONAL ASSESSMENT: PAIN_FUNCTIONAL_ASSESSMENT: NONE - DENIES PAIN

## 2023-07-12 ASSESSMENT — LIFESTYLE VARIABLES
HOW MANY STANDARD DRINKS CONTAINING ALCOHOL DO YOU HAVE ON A TYPICAL DAY: PATIENT DOES NOT DRINK
HOW OFTEN DO YOU HAVE A DRINK CONTAINING ALCOHOL: NEVER

## 2023-07-12 NOTE — ED NOTES
Pt resting in bed at this time, laying in a supine position with head of bed elevated . Call light remains in reach instructed pt how to use, and encouraged pt to call if needed assistance, no distress noted. RR even and unlabored, skin warm and dry. No needs at this time. Will continue to monitor closely. Pt's daughter and wife remains at bedside at this time.       [place]  07/12/23 5002

## 2023-07-12 NOTE — ED TRIAGE NOTES
Pt to ED via [facility] from home c/c fall. Pt states he hit his head when he fell and he felt \"dizzy\". Pt denies LOC. Pt takes eliquis. Pt has laceration to the right side of forehead. Pt denies pain. VSS, afebrile. Walker Burks NP at bedside at this time. Pt's spouse is at bedside. Pt resting in bed at this time, laying in a supine position with head of bed elevated . Call light remains in reach instructed pt how to use, and encouraged pt to call if needed assistance, no distress noted. RR even and unlabored, skin warm and dry. No needs at this time. Will continue to monitor closely.

## 2023-07-12 NOTE — ED PROVIDER NOTES
[address] [facility] [address]  Dept: [phone]  Loc: [phone]  eMERGENCYdEPARTMENT eNCOUnter      Pt Name: Allyssa Pinedo  MRN: [id number]  [address] [facility] 8/8/1926  Date of evaluation: 7/12/2023  Provider:VALDEMAR Martin CNP      Independently seen and evaluated by the advanced practice provider  CHIEF COMPLAINT       Chief Complaint   Patient presents with    Fall     Pt to ED via [facility] from home c/c fall. Pt states he hit his head when he fell. Pt denies LOC. Pt takes eliquis. Pt has laceration to the right side of forehead. Pt denies pain. VSS       CRITICAL CARE TIME       HISTORY OF PRESENT ILLNESS  (Location/Symptom, Timing/Onset, Context/Setting, Quality, Duration,Modifying Factors, Severity.)   Allyssa Pinedo is a 80 y.o. male who presents to the emergency department PMHx: Pacemaker, HLD, HTN, paroxysmal A-fib, restrictive airway disease, subdural, prostate cancer    Lives at home w/ wife, still drives  Anticoagulation therapy: Eliquis  CODE STATUS: Full  H POA: Domingo Albino    HPI provided by the patient    Patient presented to the emergency department via [facility] from home with reports of some lightheadedness preceding a trip and fall on a concrete surface at home. No loss of consciousness. No chest pain. Patient reports that he has been feeling a little lightheaded. He did come back home from running errands and essentially tripped on concrete and fell forward. Nursing Notes were reviewedand agreed with or any disagreements were addressed in the HPI. REVIEW OF SYSTEMS    (2-9 systems for level 4, 10 or more for level 5)     Review of Systems   Constitutional: Negative. Musculoskeletal:  Negative for neck pain. Skin:         Right sided scalp abrasion   Neurological:  Positive for light-headedness.      Except as noted above the remainder

## 2023-07-12 NOTE — ED NOTES
Report called to Trinidad Betancourt for Rm [id number]. Questions addressed and report accepted. Transport request to be placed momentarily.       Lotus Loomis RN  07/12/23 0932

## 2023-07-12 NOTE — ED NOTES
EKG My Reading:  Rate: 76  Rhythm: sinus with 1st degree AV block  ST Segments: lateral T wave inversions present   STEMI: no  QTc: 477 (prolonged)  Comparison to prior: compared to 01/2023  - Questionable new nonspecific T wave change in lead AVL versus motion artifact  - Other lateral T wave morphology is not significantly changed  - 1st degree AV block remains present  - QTc prolongation is new         Savita Hall MD  07/12/23 1148

## 2023-07-12 NOTE — ED NOTES
Pt dinner tray ordered at this time. Pt's spouse and daughter remains at bedside.      [place], [place]  07/12/23 5942

## 2023-07-13 VITALS
TEMPERATURE: 97.2 F | HEART RATE: 78 BPM | HEIGHT: 69 IN | BODY MASS INDEX: 20.34 KG/M2 | RESPIRATION RATE: 16 BRPM | WEIGHT: 137.35 LBS | OXYGEN SATURATION: 100 % | DIASTOLIC BLOOD PRESSURE: 62 MMHG | SYSTOLIC BLOOD PRESSURE: 153 MMHG

## 2023-07-13 LAB
ALBUMIN SERPL-MCNC: 3.6 G/DL (ref 3.4–5)
ANION GAP SERPL CALCULATED.3IONS-SCNC: 9 MMOL/L (ref 3–16)
BUN SERPL-MCNC: 21 MG/DL (ref 7–20)
CALCIUM SERPL-MCNC: 8.8 MG/DL (ref 8.3–10.6)
CHLORIDE SERPL-SCNC: 103 MMOL/L (ref 99–110)
CO2 SERPL-SCNC: 26 MMOL/L (ref 21–32)
CREAT SERPL-MCNC: 1.3 MG/DL (ref 0.8–1.3)
EKG ATRIAL RATE: 76 BPM
EKG DIAGNOSIS: NORMAL
EKG P-R INTERVAL: 304 MS
EKG Q-T INTERVAL: 418 MS
EKG QRS DURATION: 78 MS
EKG QTC CALCULATION (BAZETT): 470 MS
EKG R AXIS: -35 DEGREES
EKG T AXIS: 212 DEGREES
EKG VENTRICULAR RATE: 76 BPM
GFR SERPLBLD CREATININE-BSD FMLA CKD-EPI: 50 ML/MIN/{1.73_M2}
GLUCOSE SERPL-MCNC: 103 MG/DL (ref 70–99)
PHOSPHATE SERPL-MCNC: 4 MG/DL (ref 2.5–4.9)
POTASSIUM SERPL-SCNC: 4.2 MMOL/L (ref 3.5–5.1)
SODIUM SERPL-SCNC: 138 MMOL/L (ref 136–145)

## 2023-07-13 PROCEDURE — G0378 HOSPITAL OBSERVATION PER HR: HCPCS

## 2023-07-13 PROCEDURE — 93005 ELECTROCARDIOGRAM TRACING: CPT | Performed by: INTERNAL MEDICINE

## 2023-07-13 PROCEDURE — 97161 PT EVAL LOW COMPLEX 20 MIN: CPT

## 2023-07-13 PROCEDURE — 80069 RENAL FUNCTION PANEL: CPT

## 2023-07-13 PROCEDURE — 97165 OT EVAL LOW COMPLEX 30 MIN: CPT

## 2023-07-13 PROCEDURE — 6370000000 HC RX 637 (ALT 250 FOR IP): Performed by: INTERNAL MEDICINE

## 2023-07-13 PROCEDURE — 93010 ELECTROCARDIOGRAM REPORT: CPT | Performed by: INTERNAL MEDICINE

## 2023-07-13 PROCEDURE — 94760 N-INVAS EAR/PLS OXIMETRY 1: CPT

## 2023-07-13 PROCEDURE — 96361 HYDRATE IV INFUSION ADD-ON: CPT

## 2023-07-13 PROCEDURE — 36415 COLL VENOUS BLD VENIPUNCTURE: CPT

## 2023-07-13 PROCEDURE — 97530 THERAPEUTIC ACTIVITIES: CPT

## 2023-07-13 RX ORDER — CALCIUM CARBONATE 500(1250)
500 TABLET ORAL 2 TIMES DAILY
Qty: 180 TABLET | Refills: 3 | Status: SHIPPED | OUTPATIENT
Start: 2023-07-13

## 2023-07-13 RX ADMIN — APIXABAN 2.5 MG: 2.5 TABLET, FILM COATED ORAL at 07:59

## 2023-07-13 ASSESSMENT — ENCOUNTER SYMPTOMS
SHORTNESS OF BREATH: 0
GASTROINTESTINAL NEGATIVE: 1
BACK PAIN: 0

## 2023-07-13 NOTE — CONSULTS
[address] [facility]   [facility] [employer]  [phone]  Nephrology Consult Note          Patient ID: Allyssa Pinedo  Referring/ Physician: Seth Huggins MD      HPI/Summary:   Allyssa Pinedo is being seen by nephrology for KIMI. This is a 79 yo man with  HTN, HLD, pacemaker. Says he was out at the store and felt lightheaded and dizzy; he fell and hit his head. He did not pass out. No palpitations. No changes in urine output. No NSAIDs use. No urinary complaints     CXR was negative   Cr 1.7 on admission. Was given IVF overnight and KIMI resolved. Cr 1.7 > 1.3         Plan:   - KIMI has resolved with IV fluids. - bladder scan showed no urine retention. - can discharge from nephrology perspective. - encouraged PO hydration and avoiding NSAIDs. #KIMI  Cr elevated at 1.7 on admission. He has a baseline Cr 1.2-1.3  No other acute electrolyte issues. UA was overall bland with only 30 mg/dl albumin  Agree with gentle IVFs at 75 cc/hr. Prior EF was normal. No other concerning medications on med list.   Bladder scan to ensure no retention         [facility] would like to thank you for the opportunity to serve this patient. Please call with any questions or concerns. Eric Mcintyre MD  [facility]  [facility], [address] [place] [address] Avenue  Fax: [phone]  Office: [phone]         CC/Reason for consult:   Reason for consult:   Chief Complaint   Patient presents with    Fall     Pt to ED via [facility] from home c/c fall. Pt states he hit his head when he fell. Pt denies LOC. Pt takes eliquis. Pt has laceration to the right side of forehead. Pt denies pain. VSS           Review of Systems:   [address] All other remaining systems are negative. Constitutional:  fever, chills, weakness, weight change, fatigue,      Skin:  rash, pruritus, hair loss, bruising, dry skin, petechiae.   Head, Face, Neck   headaches, swelling,  cervical
Thank you for consulting [facility]. This is a 81 yo man admitted with fall. He has a h/o HTN, HLD, pacemaker. CXR negative. Cr elevated at 1.7. He has a baseline Cr 1.2-1.3  No other acute electrolyte issues. UA was overall bland with only 30 mg/dl albumin    Agree with gentle IVFs at 75 cc/hr. Prior EF was normal. No other concerning medications on med list.   Bladder scan to ensure no retention     Full consult will follow. Please message me in perfect serve with any questions or concerns that may arise.      Thank you  Laci Hernandez MD
medical decision making:  Review of prior note(s) from each unique source relevant to today's visit: Hospitalist, Case management, Nephrology. Unique test results reviewed: CBC, BMP, UA. Risk of Complications/Morbidity: High   Illness(es)/ Infection present that pose threat to bodily function. There is potential for severe exacerbation of condition/side effects of treatment. Therapy requires intensive monitoring for toxicity     Advanced Care Planning Note. Purpose of Encounter: Advanced care planning   Parties In Attendance: Patient, wife, alternate Alba Elier. Decisional Capacity: Limited   Goals of Care Determination: At this time, patient will remain a full code. See above. The Patient has the following current code status:    Code Status: Full Code  Time spent on Advanced care Plannin minutes  Advanced Care Planning Documents: Completed advanced directives on chart, Wife, Suresh Has, is the [facility]. I have spent a total of 78 minutes on: Performing a medical appropriate examination and/or evaluation    Counseling and educating the patient/family/caregiver  Preparing to see the patient (e.g., review of tests)  Referring / communicating with other healthcare professionals including care coordination (not separately reported):  Hospitalist, Case management  Documenting clinical information in the electronic health record          Signed By: Electronically signed by VALDEMAR Reyes CNP on 2023 at 9:32 AM  Palliative Medicine     2023

## 2023-07-13 NOTE — H&P
[address], [address]                              HISTORY AND PHYSICAL    PATIENT NAME: Paula Lee               :        1926  MED REC NO:   [id number]                          ROOM:       5260  ACCOUNT NO:   [de-identified]                           ADMIT DATE: 2023  PROVIDER:     Berkley Gama MD    Patient of Dr. Brenda Downs. CHIEF COMPLAINT:  \"I want to go home. \"    HISTORY OF PRESENT ILLNESS:  The patient is a fairly healthy 80-year-old   retired [place] American male, who lives in his own home with  his wife. The patient is prone to having dizzy spells. On the day of  admission, the patient fell at home following a dizzy spell striking the  right side of his head. There was no loss of consciousness. The  patient was brought to the emergency room for evaluation. Since the  patient was on Eliquis CT of his head was done that showed chronic  atrophic changes with no acute bleed, etc.    Laboratory results in the emergency room revealed slight increase in his  BUN to 23 and creatinine to 1.7. Random glucose was 147, slight  increase in troponin to 34. Hemoglobin 10.9, hematocrit 31.8, white  count 5000, platelet count 740,674. INR was 13.7. Urinalysis was normal other than trace protein. Electrocardiogram  showed sinus rhythm without acute changes. Nurse practitioner felt the  patient should be admitted due to slight bump in his BUN and creatinine  and slight bump in his troponin. I was contacted, agreed to admit the  patient to observation on telemetry with consultation. The patient's  family agreed to admission. PAST MEDICAL HISTORY:  The patient's past medical history is pertinent  for chronic kidney disease, hypertension, prostate cancer generalized  osteoarthritis, paroxysmal AFib on Eliquis therapy, and chronic  permanent pacemaker.     FAMILY HISTORY:

## 2023-07-13 NOTE — CARE COORDINATION
[facility]    Referral received from  to follow for home care services. I will follow for needs, and speak with patient to verify demos.     Danyelle Vazquez RN, BSN CTN  [facility] [phone]

## 2023-07-13 NOTE — CARE COORDINATION
Case Management Discharge Note          Date / Time of Note: 7/13/2023 12:13 PM                  Patient Name: Sin Gudino   YOB: 1926  Diagnosis: Lightheadedness [R42]  Elevated troponin [R77.8]  KIMI (acute kidney injury) ([address]) [N17.9]  Injury of head, initial encounter [X37.35RW]  Fall, initial encounter [W19. HLBR]   Date / Time: 7/12/2023  2:19 PM    Financial:  Payor: MEDICARE / Plan: MEDICARE PART A AND B / Product Type: *No Product type* /      Pharmacy:    [facility] [id number] - [address] [address]  Phone: [phone] Fax: [phone]      Assistance purchasing medications?: Potential Assistance Purchasing Medications: No  Assistance provided by Case Management: None at this time    DISCHARGE Disposition: Home with [address]:  Home Care ordered at discharge: Yes  [address] [place] [address] Avenue: [address] [place] [address]  Phone: [phone]  Fax: [phone]  Orders faxed: Yes    Consents signed: Yes    Referrals made at DISCHARGE for outpatient continued care:  Not Applicable    Transportation:  Transportation PLAN for discharge: family   Mode of Transport: Private Car    IMM Completed:   Not Indicated    Additional CM Notes: Pt will return [facility], [facility]. The Plan for Transition of Care is related to the following treatment goals of Lightheadedness [R42]  Elevated troponin [R77.8]  KIMI (acute kidney injury) ([address]) [N17.9]  Injury of head, initial encounter [D29.24HL]  Fall, initial encounter [W19. XXXA]    The Patient and/or patient representative Brenda Lares and his family were provided with a choice of provider and agrees with the discharge plan Yes    Freedom of choice list was provided with basic dialogue that supports the patient's individualized plan of care/goals and shares the quality data associated with the providers.  Yes    Adilene Burgos

## 2023-07-13 NOTE — CARE COORDINATION
Case Management Assessment  Initial Evaluation    Date/Time of Evaluation: 7/13/2023 10:36 AM  Assessment Completed by: FELICE Cao    If patient is discharged prior to next notation, then this note serves as note for discharge by case management. Patient Name: Yesika Villalba                   YOB: 1926  Diagnosis: Lightheadedness [R42]  Elevated troponin [R77.8]  KIMI (acute kidney injury) ([address]) [N17.9]  Injury of head, initial encounter [B22.41XC]  Fall, initial encounter [W19. XXXA]                   Date / Time: 7/12/2023  2:19 PM    Patient Admission Status: Observation   Readmission Risk (Low < 19, Mod (19-27), High > 27): No data recorded  Current PCP: Jaz Villa MD  PCP verified by CM? Yes    Chart Reviewed: Yes      History Provided by: Patient, Child/Family, Spouse  Patient Orientation: Alert and Oriented    Patient Cognition: Alert    Hospitalization in the last 30 days (Readmission):  No    If yes, Readmission Assessment in  Navigator will be completed.     Advance Directives:      Code Status: Full Code   Patient's Primary Decision Maker is: Named in Scanned ACP Document    Primary Decision Maker: Brea Almanza - Spouse - [phone]    Secondary Decision Maker: Derek De León - [phone]    Discharge Planning:    Patient lives with: (P) Spouse/Significant Other Type of Home: (P) House  Primary Care Giver: Spouse  Patient Support Systems include: Spouse/Significant Other, Family Members   Current Financial resources: (P) None  Current community resources: (P) None  Current services prior to admission: (P) None            Current DME:              Type of Home Care services:  (P) OT, PT    ADLS  Prior functional level: (P) Independent in ADLs/IADLs  Current functional level: (P) Independent in ADLs/IADLs    PT AM-PAC: 18 /24  OT AM-PAC: 17 /24    Family can provide assistance at DC: (P) Yes  Would you like Case Management to discuss the discharge

## 2023-07-13 NOTE — DISCHARGE SUMMARY
[address], [facility] [address]                               DISCHARGE SUMMARY    PATIENT NAME: Sadaf Mendoza               :        1926  MED REC NO:   [id number]                          ROOM:       5260  ACCOUNT NO:   [de-identified]                           ADMIT DATE: 2023  PROVIDER:     Roseline Hebert MD                  DISCHARGE DATE:  2023    Patient Dr. Tamanna Portillo:  1. Fall at home. 2.  Scalp lacerations. 3.  Dizziness. 4.  Paroxysmal atrial fibrillation. 5.  Hypertension. 6.  Hyperlipidemia. HISTORY:  The patient is generally healthy 80-year-old   retired male, who lives in his own home with his wife. The patient came  to the emergency room because of a fall at home preceded by dizziness  and resulting in two scalp lacerations. The patient was seen in the  emergency room. Admission was felt necessary because of slight increase  in his BUN and creatinine, and slight increase in his troponin. APPROPRIATE STUDIES:  Following gentle hydration, creatinine returned to  normal.  Vital signs remained stable. The patient had no further  dizziness at rest.  Troponin remained slightly elevated, probably due to  his chronic kidney disease. The patient was seen by Nephrology, cardiology and therapy. The patient  seemed to be at his baseline and requested discharge to his home. The  patient appeared to be stable and felt it was safe to discharge the  patient back to his home. The patient and family agreed to this plan. The patient had no known allergies. Discharge meds were per the med  rec. Followup will be with Dr. Rupinder Camargo in a week or two.         Roseline Hebert MD    D: 2023 9:40:12       T: 2023 9:44:27     [email or web address]  Job#: [id number]     Doc#: [id number]    CC:

## 2023-07-13 NOTE — PROGRESS NOTES
4 Eyes Skin Assessment     NAME:  Jayson Walls  YOB: 1926  MEDICAL RECORD NUMBER:  [id number]    The patient is being assessed for  Admission    I agree that at least one RN has performed a thorough Head to Toe Skin Assessment on the patient. ALL assessment sites listed below have been assessed. Areas assessed by both nurses:    Head, Face, Ears, Shoulders, Back, Chest, Arms, Elbows, Hands, Sacrum. Buttock, Coccyx, Ischium, and Legs. Feet and Heels        Does the Patient have a Wound?  No noted wound(s)       Sherman Prevention initiated by RN: No  Wound Care Orders initiated by RN: No    Pressure Injury (Stage 3,4, Unstageable, DTI, NWPT, and Complex wounds) if present, place Wound referral order by RN under : No    New Ostomies, if present place, Ostomy referral order under : No     Nurse 1 eSignature: Electronically signed by Katheleen Bumpers, RN on 7/12/23 at 10:53 PM EDT    **SHARE this note so that the co-signing nurse can place an eSignature**    Nurse 2 eSignature: Electronically signed by Bess Johnson RN on 7/12/23 at 11:25 PM EDT
Admitted patient to room 5260 from the emergency department . Oriented to room, call light, tv, phone and dietary services. Respirations easy unlabored, denies pain. Bed in lowest position and locked. Exit alarms in place. Non slip socks on. ID bracelet on and correct per patient verbally reporting name and date of birth. Call light and needed items in reach.  Wife and POA at bedside
Occupational Therapy  Facility/Department: [address] PROGRESSIVE CARE  Occupational Therapy Initial Assessment and Tentative D/C      Name: Vish Hutchinson  : 1926  MRN: [id number]  Date of Service: 2023    Discharge Recommendations: Vish Hutchinson scored a 17/24 on the AM-PAC ADL Inpatient form. Current research shows that an AM-PAC score of 18 or greater is typically associated with a discharge to the patient's home setting. Based on the patient's AM-PAC score, and their current ADL deficits, it is recommended that the patient have 2-3 sessions per week of Occupational Therapy at d/c to increase the patient's independence. At this time, this patient demonstrates the endurance and safety to discharge home with [facility] and a follow up treatment frequency of 2-3x/wk. Please see assessment section for further patient specific details. If patient discharges prior to next session this note will serve as a discharge summary. Please see below for the latest assessment towards goals. Continue to assess pending progress, 24 hour supervision or assist, 2-3 sessions per week, Patient would benefit from continued therapy after discharge (initial 24hr; home aide for IADLs)  OT Equipment Recommendations  Equipment Needed: No       Patient Diagnosis(es): The primary encounter diagnosis was Injury of head, initial encounter. Diagnoses of Fall, initial encounter, Lightheadedness, Elevated troponin, and KIMI (acute kidney injury) ([address]) were also pertinent to this visit. Past Medical History:  has a past medical history of KIMI (acute kidney injury) ([address]), Cancer ([address]), Hemorrhoids, Hyperlipidemia, Hypertension, Influenza A, and Shortness of breath. Past Surgical History:  has a past surgical history that includes Endoscopy, colon, diagnostic; Upper gastrointestinal endoscopy; hernia repair (Right, 194); cyst removal; Carpal tunnel release (Right, 09/01/15);  Carpal tunnel release (Left, 9/22/15); and
Patient has discharge order, and is now ready for discharge. IV removed without complications. Patient is alert and orientated kamlesh Frausto (POA) at bedside. Discharge information discussed with the patient and POA. All questions answered and all discharge needs were meet. Lis[facility] to transport patient home. Will discharge when the patient leaves the floor.      Electronically signed by Judy Rodriguez RN on 7/13/2023 at 12:57 PM
Patient is doing very well today, VSS on room air. Patient is A+Ox4 - wife is at bedside, both patient and wife were there for discussion of POC. Patient states no pain at this time and no dizziness with ambulation. Patient had small scrap on R knee from fall at home that was covered with mepilex. All other needs meet and all questions answered at this time.      Electronically signed by Billy Santamaria RN on 7/13/2023 at 10:10 AM
Pharmacy Medication Reconciliation Note     List of medications patient is currently taking is complete. Source of information:   1. Conversation with patient and patient's family at bedside  2. EMR    Notes regarding home medications:   1. Patient did not take any of his home medication doses today before presenting to the ER.       [address] [place] [address] Avenue, PharmD  7/12/2023 5:31 PM
alarm in place  Restraints  Restraints Initially in Place: No     Restrictions  Restrictions/Precautions  Restrictions/Precautions: Fall Risk  Position Activity Restriction  Other position/activity restrictions: hx of dizziness/lightheadedness     Subjective   General  Chart Reviewed: Yes  Patient assessed for rehabilitation services?: Yes  Additional Pertinent Hx: Pt is a 80 y.o. M. under observation for some lightheadedness preceding a trip and fall on a concrete surface at home. No loss of consciousness. No chest pain. Patient reports that he has been feeling a little lightheaded. He did come back home from running errands and essentially tripped on concrete and fell forward. Response To Previous Treatment: Not applicable  Referring Practitioner: Cyril Rinaldi MD  Referral Date : 07/12/23  Diagnosis: Lightheadedness; Fall; Elevated troponin; KIMI  Follows Commands: Within Functional Limits  Subjective  Subjective: Pleasant and agreeable to evaluation. C/o weakness, lightheadedness.          Social/Functional History  Social/Functional History  Lives With: Spouse  Type of Home: House  Home Layout: One level, Laundry in basement  Home Access: Stairs to enter with rails, Level entry (level entry through garage?)  Entrance Stairs - Number of Steps: 3-4  Bathroom Shower/Tub: Tub/Shower unit, Shower chair with back  [employer]: Standard (raised toilet seat with rails)  Bathroom Equipment: Shower chair, Grab bars in [address] [place] [address] Street: sameer [place]  Has the patient had two or more falls in the past year or any fall with injury in the past year?: Yes  ADL Assistance: [address]  (shares with wife)  Homemaking Responsibilities: Yes  Ambulation Assistance: Independent  Transfer Assistance: Independent  Active : Yes    Cognition   Orientation  Overall Orientation Status: Within Functional Limits     Objective     AROM RLE (degrees)  RLE AROM: WFL  AROM LLE

## 2023-07-13 NOTE — DISCHARGE INSTR - COC
Continuity of Care Form    Patient Name: Radha Krishnamurthy   :  1926  MRN:  [id number]    Admit date:  2023  Discharge date:  2023    Code Status Order: Full Code   Advance Directives:     Admitting Physician:  Senthil Cade MD  PCP: Senthil Cade MD    Discharging Nurse: [facility] Unit/Room#: D8S-5985/2288-53  Discharging Unit Phone Number:     Emergency Contact:   Extended Emergency Contact Information  Primary Emergency Contact: Lizeth Salguero  Address: [address] [place] [address], [address] [facility] [place] of [address] [address] Phone: [phone]  Relation: Spouse  Secondary Emergency Contact: Yesenia Chase of [address] [place] Phone: [phone]  Mobile Phone: [phone]  Relation: Child    Past Surgical History:  Past Surgical History:   Procedure Laterality Date    CARPAL TUNNEL RELEASE Right 09/01/15    Right carpal tunnel release      CARPAL TUNNEL RELEASE Left 9/22/15    COLONOSCOPY N/A 2019    COLONOSCOPY DIAGNOSTIC performed by Erik Rojas MD at [address]      right wrist    ENDOSCOPY, COLON, DIAGNOSTIC      HERNIA REPAIR Right     inguinal    UPPER GASTROINTESTINAL ENDOSCOPY      with dilatation       Immunization History:   Immunization History   Administered Date(s) Administered    COVID-19, MODERNA Bivalent, (age 12y+), IM, 48 mcg/0.5 mL 2023    Influenza 2010    Influenza, High Dose (Fluzone 65 yrs and older) 2011, 2012, 2013, 2014, 10/28/2015, 2017, 10/22/2018    Influenza, Triv, inactivated, subunit, adjuvanted, IM (Fluad 65 yrs and older) 2019    Pneumococcal, PCV-13, PREVNAR 15, (age 6w+), IM, 0.5mL 2017, 2017    Pneumococcal, PPSV23, PNEUMOVAX 23, (age 2y+), SC/IM, 0.5mL 2014    TDaP, ADACEL (age 6y-58y), BOOSTRIX (age 10y+), IM, 0.5mL 2017, 2017, 2023       Active Problems:  Patient

## 2023-07-13 NOTE — CARE COORDINATION
[facility]    DC order noted, all docs needed have been faxed to [facility] for home care services.     Home care to see patient within 24-48 hrs    Zion Arguello RN, BSN CTN  [facility] [phone] [Medication Risks Reviewed] : Medication risks reviewed [Surgical risks reviewed] : Surgical risks reviewed [de-identified] : Burak is a 67-year-old female with right knee osteoarthritis presenting to the office with an acute exacerbation over the past 6 weeks. I did inject her knee with a cortisone injection today which she tolerated well. I did prescribe her Mobic 7.5 mg daily as needed for pain control. A prescription for physical therapy for her right knee was also given. We did discuss operative versus nonoperative indications and the risks and benefits of surgery. She would like to continue with conservative treatment at this time. I do think this is a reasonable treatment option. I will have her follow-up with me in 4 weeks for repeat examination. She would also like to try the gel injections. I have ordered those for her today. We will follow up after the gel injections.

## 2023-07-13 NOTE — ACP (ADVANCE CARE PLANNING)
Advance Care Planning     Advance Care Planning Activator (Inpatient)  Conversation Note      Date of ACP Conversation: 7/13/2023     Conversation Conducted with: Patient with Decision Making Capacity    ACP Activator: Gustavo Nirav, MSW        Health Care Decision Maker:     Current Designated Health Care Decision Maker:     Primary Decision Maker: Milagros [address] - [phone]    Secondary Decision Maker: Praveena Mitchell - [phone]    Today we documented Decision Maker(s) consistent with ACP documents on file. Care Preferences    Ventilation: \"If you were in your present state of health and suddenly became very ill and were unable to breathe on your own, what would your preference be about the use of a ventilator (breathing machine) if it were available to you? \"      Would the patient desire the use of ventilator (breathing machine)?: yes    \"If your health worsens and it becomes clear that your chance of recovery is unlikely, what would your preference be about the use of a ventilator (breathing machine) if it were available to you? \"     Would the patient desire the use of ventilator (breathing machine)?: No      Resuscitation  \"CPR works best to restart the heart when there is a sudden event, like a heart attack, in someone who is otherwise healthy. Unfortunately, CPR does not typically restart the heart for people who have serious health conditions or who are very sick. \"    \"In the event your heart stopped as a result of an underlying serious health condition, would you want attempts to be made to restart your heart (answer \"yes\" for attempt to resuscitate) or would you prefer a natural death (answer \"no\" for do not attempt to resuscitate)? \" yes       [x] Yes   [] No   Educated Patient / Patrick Willetting regarding differences between Advance Directives and portable DNR orders.     Length of ACP Conversation in minutes:  5 minutes    Conversation Outcomes:  ACP discussion

## 2023-07-13 NOTE — PLAN OF CARE
Problem: Discharge Planning  Goal: Discharge to home or other facility with appropriate resources  7/13/2023 0916 by Jose Giraldo RN  Outcome: Progressing     Problem: Skin/Tissue Integrity  Goal: Absence of new skin breakdown  Description: 1. Monitor for areas of redness and/or skin breakdown  2. Assess vascular access sites hourly  3. Every 4-6 hours minimum:  Change oxygen saturation probe site  4. Every 4-6 hours:  If on nasal continuous positive airway pressure, respiratory therapy assess nares and determine need for appliance change or resting period.   7/13/2023 0916 by Jose Giraldo RN  Outcome: Progressing     Problem: ABCDS Injury Assessment  Goal: Absence of physical injury  7/13/2023 0916 by Jose Giraldo RN  Outcome: Progressing     Problem: Safety - Adult  Goal: Free from fall injury  7/13/2023 0916 by Jose Giraldo RN  Outcome: Progressing

## 2023-12-11 ENCOUNTER — APPOINTMENT (OUTPATIENT)
Dept: CT IMAGING | Age: 88
End: 2023-12-11
Payer: MEDICARE

## 2023-12-11 ENCOUNTER — APPOINTMENT (OUTPATIENT)
Dept: GENERAL RADIOLOGY | Age: 88
End: 2023-12-11
Payer: MEDICARE

## 2023-12-11 ENCOUNTER — HOSPITAL ENCOUNTER (EMERGENCY)
Age: 88
Discharge: HOME | End: 2023-12-11
Attending: EMERGENCY MEDICINE
Payer: MEDICARE

## 2023-12-11 VITALS
SYSTOLIC BLOOD PRESSURE: 141 MMHG | RESPIRATION RATE: 18 BRPM | WEIGHT: 151.68 LBS | TEMPERATURE: 97.3 F | OXYGEN SATURATION: 96 % | BODY MASS INDEX: 22.4 KG/M2 | DIASTOLIC BLOOD PRESSURE: 70 MMHG | HEART RATE: 74 BPM

## 2023-12-11 DIAGNOSIS — S49.91XA RIGHT SHOULDER INJURY, INITIAL ENCOUNTER: ICD-10-CM
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
## 2023-12-11 DIAGNOSIS — S09.90XA CLOSED HEAD INJURY, INITIAL ENCOUNTER: ICD-10-CM

## 2023-12-11 LAB
ANION GAP SERPL CALCULATED.3IONS-SCNC: 7 MMOL/L (ref 3–16)
BASOPHILS # BLD: 0 K/UL (ref 0–0.2)
BASOPHILS NFR BLD: 0.8 %
BUN SERPL-MCNC: 19 MG/DL (ref 7–20)
CALCIUM SERPL-MCNC: 9.6 MG/DL (ref 8.3–10.6)
CHLORIDE SERPL-SCNC: 101 MMOL/L (ref 99–110)
CO2 SERPL-SCNC: 26 MMOL/L (ref 21–32)
CREAT SERPL-MCNC: 1.5 MG/DL (ref 0.8–1.3)
DEPRECATED RDW RBC AUTO: 14.3 % (ref 12.4–15.4)
EKG ATRIAL RATE: 68 BPM
EKG DIAGNOSIS: NORMAL
EKG P AXIS: 95 DEGREES
EKG P-R INTERVAL: 328 MS
EKG Q-T INTERVAL: 444 MS
EKG QRS DURATION: 84 MS
EKG QTC CALCULATION (BAZETT): 472 MS
EKG R AXIS: -34 DEGREES
EKG T AXIS: -20 DEGREES
EKG VENTRICULAR RATE: 68 BPM
EOSINOPHIL # BLD: 0.1 K/UL (ref 0–0.6)
EOSINOPHIL NFR BLD: 1.1 %
GFR SERPLBLD CREATININE-BSD FMLA CKD-EPI: 42 ML/MIN/{1.73_M2}
GLUCOSE SERPL-MCNC: 99 MG/DL (ref 70–99)
HCT VFR BLD AUTO: 32.2 % (ref 40.5–52.5)
HGB BLD-MCNC: 11 G/DL (ref 13.5–17.5)
LYMPHOCYTES # BLD: 1.9 K/UL (ref 1–5.1)
LYMPHOCYTES NFR BLD: 32.7 %
MCH RBC QN AUTO: 32 PG (ref 26–34)
MCHC RBC AUTO-ENTMCNC: 34.3 G/DL (ref 31–36)
MCV RBC AUTO: 93.3 FL (ref 80–100)
MONOCYTES # BLD: 0.7 K/UL (ref 0–1.3)
MONOCYTES NFR BLD: 12.9 %
NEUTROPHILS # BLD: 3 K/UL (ref 1.7–7.7)
NEUTROPHILS NFR BLD: 52.5 %
PLATELET # BLD AUTO: 171 K/UL (ref 135–450)
PMV BLD AUTO: 8.1 FL (ref 5–10.5)
POTASSIUM SERPL-SCNC: 4.8 MMOL/L (ref 3.5–5.1)
RBC # BLD AUTO: 3.45 M/UL (ref 4.2–5.9)
SODIUM SERPL-SCNC: 134 MMOL/L (ref 136–145)
WBC # BLD AUTO: 5.7 K/UL (ref 4–11)

## 2023-12-11 PROCEDURE — 85025 COMPLETE CBC W/AUTO DIFF WBC: CPT

## 2023-12-11 PROCEDURE — 70450 CT HEAD/BRAIN W/O DYE: CPT

## 2023-12-11 PROCEDURE — 73030 X-RAY EXAM OF SHOULDER: CPT

## 2023-12-11 PROCEDURE — 93010 ELECTROCARDIOGRAM REPORT: CPT | Performed by: INTERNAL MEDICINE

## 2023-12-11 PROCEDURE — 80048 BASIC METABOLIC PNL TOTAL CA: CPT

## 2023-12-11 PROCEDURE — 99285 EMERGENCY DEPT VISIT HI MDM: CPT

## 2023-12-11 PROCEDURE — 93005 ELECTROCARDIOGRAM TRACING: CPT | Performed by: PHYSICIAN ASSISTANT

## 2023-12-11 PROCEDURE — 72125 CT NECK SPINE W/O DYE: CPT

## 2023-12-11 ASSESSMENT — PAIN DESCRIPTION - ONSET: ONSET: ON-GOING

## 2023-12-11 ASSESSMENT — PAIN DESCRIPTION - FREQUENCY: FREQUENCY: CONTINUOUS

## 2023-12-11 ASSESSMENT — PAIN SCALES - GENERAL: PAINLEVEL_OUTOF10: 3

## 2023-12-11 ASSESSMENT — PAIN DESCRIPTION - LOCATION: LOCATION: SHOULDER

## 2023-12-11 ASSESSMENT — PAIN DESCRIPTION - PAIN TYPE: TYPE: ACUTE PAIN

## 2023-12-11 ASSESSMENT — PAIN DESCRIPTION - ORIENTATION: ORIENTATION: RIGHT

## 2023-12-11 ASSESSMENT — PAIN DESCRIPTION - DESCRIPTORS: DESCRIPTORS: ACHING

## 2023-12-11 NOTE — DISCHARGE INSTRUCTIONS
Head scan showed no fracture or internal bleeding. X-ray of your right shoulder showed no fracture. Follow-up with Dr. May Moon tomorrow as scheduled.

## 2023-12-11 NOTE — ED NOTES
D/C: Order noted for d/c. Pt confirmed d/c paperwork have correct name. Discharge and education instructions reviewed with patient. Teach-back successful. Pt verbalized understanding and denied questions at this time. No acute distress noted. Patient instructed to follow-up as noted - return to emergency department if symptoms worsen. Patient verbalized understanding. Discharged per EDMD with discharge instructions. Pt discharged to private vehicle. Patient stable upon departure. Provider aware of patient pain at time of discharge.        Lance Guerrero RN  12/11/23 5313

## 2023-12-27 PROCEDURE — 93296 REM INTERROG EVL PM/IDS: CPT | Performed by: INTERNAL MEDICINE

## 2023-12-27 PROCEDURE — 93294 REM INTERROG EVL PM/LDLS PM: CPT | Performed by: INTERNAL MEDICINE

## 2023-12-27 RX ORDER — APIXABAN 2.5 MG/1
2.5 TABLET, FILM COATED ORAL 2 TIMES DAILY
Qty: 180 TABLET | Refills: 0 | Status: SHIPPED | OUTPATIENT
Start: 2023-12-27

## 2023-12-27 NOTE — TELEPHONE ENCOUNTER
Last OV:  1/16/23  Next OV: none scheduled   Last refill: 12/21/23    Most recent Labs:  12/11/23  Last EKG (if needed): 12/11/23

## 2024-06-15 ENCOUNTER — HOSPITAL ENCOUNTER (EMERGENCY)
Age: 89
Discharge: HOME | End: 2024-06-15
Attending: EMERGENCY MEDICINE
Payer: MEDICARE

## 2024-06-15 ENCOUNTER — APPOINTMENT (OUTPATIENT)
Dept: CT IMAGING | Age: 89
End: 2024-06-15
Payer: MEDICARE

## 2024-06-15 ENCOUNTER — APPOINTMENT (OUTPATIENT)
Dept: GENERAL RADIOLOGY | Age: 89
End: 2024-06-15
Payer: MEDICARE

## 2024-06-15 VITALS
HEIGHT: 69 IN | HEART RATE: 80 BPM | SYSTOLIC BLOOD PRESSURE: 162 MMHG | DIASTOLIC BLOOD PRESSURE: 76 MMHG | BODY MASS INDEX: 20.54 KG/M2 | TEMPERATURE: 98.4 F | OXYGEN SATURATION: 100 % | RESPIRATION RATE: 15 BRPM | WEIGHT: 138.67 LBS

## 2024-06-15 DIAGNOSIS — R42 DIZZINESS: Primary | ICD-10-CM

## 2024-06-15 LAB
ALBUMIN SERPL-MCNC: 4.3 G/DL (ref 3.4–5)
ALBUMIN/GLOB SERPL: 1.4 {RATIO} (ref 1.1–2.2)
ALP SERPL-CCNC: 51 U/L (ref 40–129)
ALT SERPL-CCNC: 11 U/L (ref 10–40)
ANION GAP SERPL CALCULATED.3IONS-SCNC: 10 MMOL/L (ref 3–16)
AST SERPL-CCNC: 24 U/L (ref 15–37)
BASOPHILS # BLD: 0.1 K/UL (ref 0–0.2)
BASOPHILS NFR BLD: 1.3 %
BILIRUB SERPL-MCNC: 0.3 MG/DL (ref 0–1)
BUN SERPL-MCNC: 19 MG/DL (ref 7–20)
CALCIUM SERPL-MCNC: 9.5 MG/DL (ref 8.3–10.6)
CHLORIDE SERPL-SCNC: 98 MMOL/L (ref 99–110)
CO2 SERPL-SCNC: 27 MMOL/L (ref 21–32)
CREAT SERPL-MCNC: 1.2 MG/DL (ref 0.8–1.3)
DEPRECATED RDW RBC AUTO: 14.6 % (ref 12.4–15.4)
EOSINOPHIL # BLD: 0.2 K/UL (ref 0–0.6)
EOSINOPHIL NFR BLD: 2.4 %
GFR SERPLBLD CREATININE-BSD FMLA CKD-EPI: 55 ML/MIN/{1.73_M2}
GLUCOSE SERPL-MCNC: 94 MG/DL (ref 70–99)
HCT VFR BLD AUTO: 34.3 % (ref 40.5–52.5)
HGB BLD-MCNC: 11.6 G/DL (ref 13.5–17.5)
LYMPHOCYTES # BLD: 2 K/UL (ref 1–5.1)
LYMPHOCYTES NFR BLD: 32.7 %
MCH RBC QN AUTO: 31.4 PG (ref 26–34)
MCHC RBC AUTO-ENTMCNC: 33.8 G/DL (ref 31–36)
MCV RBC AUTO: 92.9 FL (ref 80–100)
MONOCYTES # BLD: 0.7 K/UL (ref 0–1.3)
MONOCYTES NFR BLD: 12 %
NEUTROPHILS # BLD: 3.2 K/UL (ref 1.7–7.7)
NEUTROPHILS NFR BLD: 51.6 %
PLATELET # BLD AUTO: 185 K/UL (ref 135–450)
PMV BLD AUTO: 8.4 FL (ref 5–10.5)
POTASSIUM SERPL-SCNC: 5.1 MMOL/L (ref 3.5–5.1)
PROT SERPL-MCNC: 7.4 G/DL (ref 6.4–8.2)
RBC # BLD AUTO: 3.7 M/UL (ref 4.2–5.9)
SODIUM SERPL-SCNC: 135 MMOL/L (ref 136–145)
TROPONIN, HIGH SENSITIVITY: 38 NG/L (ref 0–22)
TROPONIN, HIGH SENSITIVITY: 41 NG/L (ref 0–22)
WBC # BLD AUTO: 6.2 K/UL (ref 4–11)

## 2024-06-15 PROCEDURE — 93005 ELECTROCARDIOGRAM TRACING: CPT | Performed by: EMERGENCY MEDICINE

## 2024-06-15 PROCEDURE — 71046 X-RAY EXAM CHEST 2 VIEWS: CPT

## 2024-06-15 PROCEDURE — 70450 CT HEAD/BRAIN W/O DYE: CPT

## 2024-06-15 PROCEDURE — 84484 ASSAY OF TROPONIN QUANT: CPT

## 2024-06-15 PROCEDURE — 99285 EMERGENCY DEPT VISIT HI MDM: CPT

## 2024-06-15 PROCEDURE — 85025 COMPLETE CBC W/AUTO DIFF WBC: CPT

## 2024-06-15 PROCEDURE — 80053 COMPREHEN METABOLIC PANEL: CPT

## 2024-06-15 ASSESSMENT — PAIN - FUNCTIONAL ASSESSMENT: PAIN_FUNCTIONAL_ASSESSMENT: NONE - DENIES PAIN

## 2024-06-15 NOTE — ED TRIAGE NOTES
.Ivan Almanza is a 89 y.o. male brought himself to the ER for eval of dizziness. The patient states that he has been dizzy for the last couple of months and its getting worse in the mornings. The patient denies any pain. The patient is alert and oriented with an open and patent airway.

## 2024-06-15 NOTE — ED PROVIDER NOTES
[facility] EMERGENCY DEPARTMENT  EMERGENCY DEPARTMENT ENCOUNTER      Pt Name: Ivan Almanza  MRN: [id number]  Birthdate 8/8/1926  Date of evaluation: 6/15/2024  Provider: ARNULFO ORELLANA DO    CHIEF COMPLAINT  Chief Complaint   Patient presents with    Dizziness     Patient states that he has been dizzy the last couple of months and that it is getting worse in the morning          This patient is at risk for a communicable infection.  Therefore, personal protection equipment consisting of a mask was worn for the exam.    HPI  Ivan Almanza is a 89 y.o. male who presents with dizziness that he describes more lightheadedness than vertigo.  Occurs in the morning when he wakes up and is getting out of bed.  Is been going for several months.  It has not gotten any better but has not gotten any worse.  He is talk to his doctor about it and they tell him to sit on the edge of the bed for a few minutes while he is dizzy and go about his business.  No workup has been performed with CT or lab according to the patient.  He has fallen on occasion but none recently.  He denies any dysuria nocturia hematuria.  He denies any diarrhea.  He states in fact has been constipated.  He denies any nausea or vomiting.  ?  REVIEW OF SYSTEMS  All systems negative except as noted in the HPI.  Reviewed Nurses' notes and concur.    No LMP for male patient.    PAST MEDICAL HISTORY  Past Medical History:   Diagnosis Date    KIMI (acute kidney injury) (HCC) 7/12/2023    Cancer (HCC)     Prostate    Hemorrhoids     Hyperlipidemia     Hypertension     Influenza A 12/29/2014    Shortness of breath        FAMILY HISTORY  Family History   Problem Relation Age of Onset    Hypertension Other     Stroke Other        SOCIAL HISTORY   reports that he quit smoking about 68 years ago. His smoking use included cigarettes. He has never used smokeless tobacco. He reports that he does not drink alcohol and does not use  infection, renal failure, dehydration, pregnancy, proteinuria, bilirubinuria, or any other pathology that might be causing the patient's symptoms    The patient's blood pressure was found to be elevated according to CMS/Medicare and the Affordable Care Act/[facility] criteria. Elevated blood pressure could occur because of pain or anxiety or other reasons and does not mean that they need to have their blood pressure treated or medications otherwise adjusted. However, this could also be a sign that they will need to have their blood pressure treated or medications changed.    The patient was instructed to follow up closely with their personal physician to have their blood pressure rechecked. The patient was instructed to take a list of recent blood pressure readings to their next visit with their personal physician.    See discharge instructions for specific medications, discharge information, and treatments. They were verbally instructed to return to emergency if any problems.    (This chart has been completed using gantto Dictation Software. Although attempts have been made to ensure accuracy, words and/or phrases may not be transcribed as intended.)    Patient refused pain medicines at the time of their exam.    IMPRESSION(S):  1. Dizziness        ?  Recheck Times: 2050       Renard Coronel DO  06/15/24 6603

## 2024-06-16 LAB
EKG ATRIAL RATE: 68 BPM
EKG DIAGNOSIS: NORMAL
EKG P AXIS: 29 DEGREES
EKG P-R INTERVAL: 272 MS
EKG Q-T INTERVAL: 454 MS
EKG QRS DURATION: 74 MS
EKG QTC CALCULATION (BAZETT): 482 MS
EKG R AXIS: -34 DEGREES
EKG T AXIS: 25 DEGREES
EKG VENTRICULAR RATE: 68 BPM

## 2024-06-16 PROCEDURE — 93010 ELECTROCARDIOGRAM REPORT: CPT | Performed by: INTERNAL MEDICINE

## 2024-06-16 NOTE — DISCHARGE INSTRUCTIONS
Please continue your present treatments and medications until you receive further instructions from your healthcare professional. Take all your medications as prescribed unless a healthcare professional instructs you to do otherwise.

## 2024-07-05 ENCOUNTER — TELEPHONE (OUTPATIENT)
Dept: CARDIOLOGY CLINIC | Age: 89
End: 2024-07-05

## 2024-07-05 NOTE — TELEPHONE ENCOUNTER
Last appointment was Dr. SUPA Biggs in January 2023.  Please schedule appointment with Dr. Pichardo

## 2024-07-05 NOTE — TELEPHONE ENCOUNTER
Medication Refill    When was your last appointment with cardiology?    (If 1 yr or longer, please schedule appointment)    (If patient has been told they do not need to follow-up - medications should be filled by PCP)  01/2023    When did you last have labs drawn?     Medication needing refilled?  ELIQUIS     Dosage of the medication?  2.5 MG TABS tablet     How are you taking this medication (QD, BID, TID, QID, PRN)?   TAKE ONE TABLET BY MOUTH TWICE A DAY     Do you want a 30 or 90 day supply?  90 day supply    When will you run out of your medication?     Which Pharmacy are we sending this medication to?  [facility] [id number] [facility] [address] [facility]    Pharmacy Phone:- P [phone]   Pharmacy Fax:- F [phone]

## 2024-08-27 ENCOUNTER — TELEPHONE (OUTPATIENT)
Dept: CARDIOLOGY CLINIC | Age: 89
End: 2024-08-27

## 2024-08-27 NOTE — TELEPHONE ENCOUNTER
----- Message from VALDEMAR Pina CNP sent at 8/27/2024  2:22 PM EDT -----  Regarding: Appointment Reschedule  Hello!     Because this patient has not been seen in office in >1yr and has had 3 hospital admissions with frequent falls and dizziness, I would like him to see Dr Garcia or Dr Pichardo prior to an appointment with me.     Please let me know how I can help getting him rescheduled with one of the EP docs and let me know if I can do anything else!     Thank you!   Eli

## 2024-08-28 NOTE — TELEPHONE ENCOUNTER
Registrars  Per my discussion with HANK Farfan.   Please cancel appointment and device check on 9/10/24 with CLAY Benitez.   Please then reschedule with Dr. Garcia at [place] on 9/4/24 along with a device check prior to his office visit.     Any questions, please let me know. Thanks.     Naheed attempted to contact patient at this time. Did not answer.

## 2024-09-03 RX ORDER — APIXABAN 5 MG/1
2.5 TABLET, FILM COATED ORAL 2 TIMES DAILY
Qty: 60 TABLET | Refills: 0 | Status: SHIPPED | OUTPATIENT
Start: 2024-09-03

## 2024-09-03 NOTE — TELEPHONE ENCOUNTER
Last OV: 01/16/23  Next OV: 09/04/24  Last refill: 07/05/24  Most recent Labs: N/A  Last EKG (if needed): N/A

## 2024-09-03 NOTE — PROGRESS NOTES
Cardiac Electrophysiology Consultation   Date: 9/4/2024   Reason for Consultation: Atrial fibrillation, SSS  Consult Requesting Physician: Philip Coronado MD     Chief Complaint:   Chief Complaint   Patient presents with    Follow-up     F/U with device check        HPI: Ivan Almanza is a 89 y.o. patient with a history of atrial fibrillation, syncope s/p ILR, prostate cancer and hypertension. He was noted to have symptomatic sinus pauses in September 2021 on ILR check. He underwent implantation of a Medtronic dual chamber pacemaker on 10/14/2021 with Jr Biggs MD.  Hospitalized in 07/2023, 12/2023 & 06/2024 falls dizziness.    Interval History:   Today, he presents to office accompanied by his wife for follow up, he endorses symptoms of lightheadedness and dizziness daily.   He is compliant with his medications and tolerating them well. He denies chest pain/pressure, tightness, edema, shortness of breath, heart racing and palpitations.    Past Medical History:   Diagnosis Date    KIMI (acute kidney injury) (HCC) 7/12/2023    Cancer (HCC)     Prostate    Hemorrhoids     Hyperlipidemia     Hypertension     Influenza A 12/29/2014    Shortness of breath       Past Surgical History:   Procedure Laterality Date    CARPAL TUNNEL RELEASE Right 09/01/15    Right carpal tunnel release      CARPAL TUNNEL RELEASE Left 9/22/15    COLONOSCOPY N/A 4/8/2019    COLONOSCOPY DIAGNOSTIC performed by Gus Cooney MD at [facility] ENDOSCOPY    CYST REMOVAL      right wrist    ENDOSCOPY, COLON, DIAGNOSTIC      HERNIA REPAIR Right 1946    inguinal    UPPER GASTROINTESTINAL ENDOSCOPY      with dilatation       Allergies:  No Known Allergies    Medication:   Prior to Admission medications    Medication Sig Start Date End Date Taking? Authorizing Provider   ELIQUIS 5 MG TABS tablet TAKE 1/2 TABLET BY MOUTH TWICE A DAY 9/3/24  Yes Ozzie Garcia MD   calcium carbonate (OSCAL) 500 MG TABS tablet Take 1 tablet by mouth 2 times

## 2024-09-04 ENCOUNTER — OFFICE VISIT (OUTPATIENT)
Dept: CARDIOLOGY CLINIC | Age: 89
End: 2024-09-04

## 2024-09-04 ENCOUNTER — NURSE ONLY (OUTPATIENT)
Dept: CARDIOLOGY CLINIC | Age: 89
End: 2024-09-04

## 2024-09-04 VITALS
HEART RATE: 64 BPM | SYSTOLIC BLOOD PRESSURE: 160 MMHG | WEIGHT: 137 LBS | BODY MASS INDEX: 20.23 KG/M2 | OXYGEN SATURATION: 100 % | DIASTOLIC BLOOD PRESSURE: 50 MMHG

## 2024-09-04 DIAGNOSIS — R55 SYNCOPE AND COLLAPSE: ICD-10-CM

## 2024-09-04 DIAGNOSIS — R00.1 SYMPTOMATIC BRADYCARDIA: ICD-10-CM

## 2024-09-04 DIAGNOSIS — I48.0 PAROXYSMAL ATRIAL FIBRILLATION (HCC): Primary | ICD-10-CM

## 2024-09-04 DIAGNOSIS — I49.5 SSS (SICK SINUS SYNDROME) (HCC): ICD-10-CM

## 2024-09-04 DIAGNOSIS — I48.0 PAROXYSMAL ATRIAL FIBRILLATION (HCC): ICD-10-CM

## 2024-09-04 DIAGNOSIS — Z95.0 CARDIAC PACEMAKER IN SITU: Primary | ICD-10-CM

## 2024-09-04 NOTE — PATIENT INSTRUCTIONS
We recommend  Dr. Gus Abdullahi MD for your primary care. His phone number is [phone], his address  [address] [place] [address]    Or Dr. Hossein Kendall Address: [address] [place] [address], [place] [id number]  Phone: [phone]

## 2024-09-04 NOTE — TELEPHONE ENCOUNTER
Did he need to be scheduled with someone other than Dr. Garcia and Device ?  He has an appointment today at 3:30.  Please advise

## 2024-09-24 DIAGNOSIS — R00.1 SYMPTOMATIC BRADYCARDIA: ICD-10-CM

## 2024-09-24 DIAGNOSIS — Z95.818 STATUS POST PLACEMENT OF IMPLANTABLE LOOP RECORDER: ICD-10-CM

## 2024-09-24 DIAGNOSIS — R55 SYNCOPE AND COLLAPSE: ICD-10-CM

## 2024-09-24 DIAGNOSIS — I48.0 PAROXYSMAL ATRIAL FIBRILLATION (HCC): ICD-10-CM

## 2024-10-30 RX ORDER — APIXABAN 5 MG/1
2.5 TABLET, FILM COATED ORAL 2 TIMES DAILY
Qty: 60 TABLET | Refills: 0 | Status: SHIPPED | OUTPATIENT
Start: 2024-10-30

## 2024-10-30 NOTE — TELEPHONE ENCOUNTER
Medication:   Requested Prescriptions     Pending Prescriptions Disp Refills    ELIQUIS 5 MG TABS tablet [Pharmacy Med Name: ELIQUIS 5 MG TABLET] 60 tablet 0     Sig: TAKE 1/2 TABLET BY MOUTH TWICE A DAY     Last Filled:  # 60 on 09/03/2024    Last appt: 9/4/2024   Next appt: Visit date not found    Last OARRS:        No data to display

## 2024-12-27 RX ORDER — APIXABAN 5 MG/1
TABLET, FILM COATED ORAL
Qty: 90 TABLET | Refills: 2 | Status: SHIPPED | OUTPATIENT
Start: 2024-12-27

## 2024-12-27 NOTE — TELEPHONE ENCOUNTER
Last OV: 09/04/24  Next OV: None  Last refill: 10/30/24  Most recent Labs: 12/06/24  Last EKG (if needed): 09/04/24

## 2025-01-10 ENCOUNTER — APPOINTMENT (OUTPATIENT)
Dept: CT IMAGING | Age: 89
End: 2025-01-10
Payer: MEDICARE

## 2025-01-10 ENCOUNTER — HOSPITAL ENCOUNTER (EMERGENCY)
Age: 89
Discharge: HOME | End: 2025-01-10
Payer: MEDICARE

## 2025-01-10 VITALS
TEMPERATURE: 98.2 F | WEIGHT: 134.48 LBS | DIASTOLIC BLOOD PRESSURE: 75 MMHG | BODY MASS INDEX: 19.86 KG/M2 | RESPIRATION RATE: 18 BRPM | SYSTOLIC BLOOD PRESSURE: 159 MMHG | HEART RATE: 74 BPM | OXYGEN SATURATION: 98 %

## 2025-01-10 DIAGNOSIS — K59.00 CONSTIPATION, UNSPECIFIED CONSTIPATION TYPE: Primary | ICD-10-CM

## 2025-01-10 DIAGNOSIS — J81.1 CHRONIC PULMONARY EDEMA: ICD-10-CM

## 2025-01-10 DIAGNOSIS — R03.0 ELEVATED BLOOD PRESSURE READING: ICD-10-CM

## 2025-01-10 LAB
ALBUMIN SERPL-MCNC: 4.1 G/DL (ref 3.4–5)
ALBUMIN/GLOB SERPL: 1.6 {RATIO} (ref 1.1–2.2)
ALP SERPL-CCNC: 50 U/L (ref 40–129)
ALT SERPL-CCNC: 113 U/L (ref 10–40)
ANION GAP SERPL CALCULATED.3IONS-SCNC: 11 MMOL/L (ref 3–16)
AST SERPL-CCNC: 71 U/L (ref 15–37)
BACTERIA URNS QL MICRO: NORMAL /HPF
BASOPHILS # BLD: 0.1 K/UL (ref 0–0.2)
BASOPHILS NFR BLD: 1 %
BILIRUB SERPL-MCNC: 0.5 MG/DL (ref 0–1)
BILIRUB UR QL STRIP.AUTO: NEGATIVE
BUN SERPL-MCNC: 16 MG/DL (ref 7–20)
CALCIUM SERPL-MCNC: 9.3 MG/DL (ref 8.3–10.6)
CHLORIDE SERPL-SCNC: 99 MMOL/L (ref 99–110)
CLARITY UR: CLEAR
CO2 SERPL-SCNC: 26 MMOL/L (ref 21–32)
COLOR UR: YELLOW
CREAT SERPL-MCNC: 1.3 MG/DL (ref 0.8–1.3)
DEPRECATED RDW RBC AUTO: 14.8 % (ref 12.4–15.4)
EOSINOPHIL # BLD: 0.1 K/UL (ref 0–0.6)
EOSINOPHIL NFR BLD: 1 %
EPI CELLS #/AREA URNS AUTO: 1 /HPF (ref 0–5)
GFR SERPLBLD CREATININE-BSD FMLA CKD-EPI: 50 ML/MIN/{1.73_M2}
GLUCOSE SERPL-MCNC: 98 MG/DL (ref 70–99)
GLUCOSE UR STRIP.AUTO-MCNC: NEGATIVE MG/DL
HCT VFR BLD AUTO: 33.5 % (ref 40.5–52.5)
HGB BLD-MCNC: 11.3 G/DL (ref 13.5–17.5)
HGB UR QL STRIP.AUTO: NEGATIVE
HYALINE CASTS #/AREA URNS AUTO: 0 /LPF (ref 0–8)
KETONES UR STRIP.AUTO-MCNC: NEGATIVE MG/DL
LACTATE BLDV-SCNC: 1.2 MMOL/L (ref 0.4–1.9)
LEUKOCYTE ESTERASE UR QL STRIP.AUTO: NEGATIVE
LIPASE SERPL-CCNC: 20 U/L (ref 13–60)
LYMPHOCYTES # BLD: 1.9 K/UL (ref 1–5.1)
LYMPHOCYTES NFR BLD: 33.5 %
MCH RBC QN AUTO: 32 PG (ref 26–34)
MCHC RBC AUTO-ENTMCNC: 33.8 G/DL (ref 31–36)
MCV RBC AUTO: 94.6 FL (ref 80–100)
MONOCYTES # BLD: 0.6 K/UL (ref 0–1.3)
MONOCYTES NFR BLD: 11.3 %
NEUTROPHILS # BLD: 3 K/UL (ref 1.7–7.7)
NEUTROPHILS NFR BLD: 53.2 %
NITRITE UR QL STRIP.AUTO: NEGATIVE
PH UR STRIP.AUTO: 7 [PH] (ref 5–8)
PLATELET # BLD AUTO: 154 K/UL (ref 135–450)
PMV BLD AUTO: 9.3 FL (ref 5–10.5)
POTASSIUM SERPL-SCNC: 3.8 MMOL/L (ref 3.5–5.1)
PROT SERPL-MCNC: 6.6 G/DL (ref 6.4–8.2)
PROT UR STRIP.AUTO-MCNC: ABNORMAL MG/DL
RBC # BLD AUTO: 3.55 M/UL (ref 4.2–5.9)
RBC CLUMPS #/AREA URNS AUTO: 2 /HPF (ref 0–4)
SODIUM SERPL-SCNC: 136 MMOL/L (ref 136–145)
SP GR UR STRIP.AUTO: 1.01 (ref 1–1.03)
UA COMPLETE W REFLEX CULTURE PNL UR: ABNORMAL
UA DIPSTICK W REFLEX MICRO PNL UR: YES
URN SPEC COLLECT METH UR: ABNORMAL
UROBILINOGEN UR STRIP-ACNC: 0.2 E.U./DL
WBC # BLD AUTO: 5.6 K/UL (ref 4–11)
WBC #/AREA URNS AUTO: 1 /HPF (ref 0–5)

## 2025-01-10 PROCEDURE — 74177 CT ABD & PELVIS W/CONTRAST: CPT

## 2025-01-10 PROCEDURE — 99285 EMERGENCY DEPT VISIT HI MDM: CPT

## 2025-01-10 PROCEDURE — 2500000003 HC RX 250 WO HCPCS: Performed by: PHYSICIAN ASSISTANT

## 2025-01-10 PROCEDURE — 6360000004 HC RX CONTRAST MEDICATION: Performed by: PHYSICIAN ASSISTANT

## 2025-01-10 RX ORDER — IOPAMIDOL 755 MG/ML
75 INJECTION, SOLUTION INTRAVASCULAR
Status: COMPLETED | OUTPATIENT
Start: 2025-01-10 | End: 2025-01-10

## 2025-01-10 RX ORDER — DOCUSATE SODIUM 100 MG/1
100 CAPSULE, LIQUID FILLED ORAL 2 TIMES DAILY
Qty: 60 CAPSULE | Refills: 0 | Status: SHIPPED | OUTPATIENT
Start: 2025-01-10

## 2025-01-10 RX ADMIN — IOPAMIDOL 75 ML: 755 INJECTION, SOLUTION INTRAVENOUS at 12:51

## 2025-01-10 RX ADMIN — Medication 330 ML: at 16:19

## 2025-01-10 ASSESSMENT — PAIN DESCRIPTION - ONSET: ONSET: ON-GOING

## 2025-01-10 ASSESSMENT — PAIN DESCRIPTION - ORIENTATION: ORIENTATION: LOWER;LEFT

## 2025-01-10 ASSESSMENT — PAIN DESCRIPTION - LOCATION: LOCATION: ABDOMEN

## 2025-01-10 ASSESSMENT — PAIN DESCRIPTION - PAIN TYPE: TYPE: ACUTE PAIN

## 2025-01-10 ASSESSMENT — PAIN SCALES - GENERAL: PAINLEVEL_OUTOF10: 3

## 2025-01-10 ASSESSMENT — PAIN DESCRIPTION - DESCRIPTORS: DESCRIPTORS: ACHING

## 2025-01-10 ASSESSMENT — PAIN DESCRIPTION - FREQUENCY: FREQUENCY: CONTINUOUS

## 2025-01-10 NOTE — ED PROVIDER NOTES
[facility] EMERGENCY DEPARTMENT  EMERGENCY DEPARTMENT ENCOUNTER        Pt Name: Ivan Almanza  MRN: [id number]  Birthdate 8/8/1926  Date of evaluation: 1/10/2025  Provider: Fiordaliza Luna PA-C  PCP: Philip Coronado MD  Note Started: 11:14 AM EST 1/10/25      LEROY. I have evaluated this patient.        CHIEF COMPLAINT       Chief Complaint   Patient presents with    Constipation     Pt reports constipation \"for the last 7 days\". Pt endorses mild left lower abdominal pain/discomfort. Pt alert and oriented at this time with no signs of distress noted.       HISTORY OF PRESENT ILLNESS: 1 or more Elements     History From: patient    Ivan Almanza is a 89 y.o. male who presents For constipation.  Patient reports he is only passed small amounts of stool in 7 days.  He has tried MiraLAX with no relief.  He does not take MiraLAX daily.  He denies any nausea or vomiting.  He still passing gas.  Has a history of constipation.  He thinks he is impacted.  He does have some intermittent left lower abdominal pain.  Denies history of abdominal surgery.  Here with family friend    Nursing Notes were reviewed and agreed with or any disagreements were addressed in the HPI.    REVIEW OF SYSTEMS :      Review of Systems    Positives and Pertinent negatives as per HPI.     SURGICAL HISTORY     Past Surgical History:   Procedure Laterality Date    CARPAL TUNNEL RELEASE Right 09/01/15    Right carpal tunnel release      CARPAL TUNNEL RELEASE Left 9/22/15    COLONOSCOPY N/A 4/8/2019    COLONOSCOPY DIAGNOSTIC performed by Gus Cooney MD at [facility] ENDOSCOPY    CYST REMOVAL      right wrist    ENDOSCOPY, COLON, DIAGNOSTIC      HERNIA REPAIR Right 1946    inguinal    UPPER GASTROINTESTINAL ENDOSCOPY      with dilatation       CURRENTMEDICATIONS       Previous Medications    ALBUTEROL SULFATE  (90 BASE) MCG/ACT INHALER    Inhale 2 puffs into the lungs 4 times daily as needed for Wheezing

## 2025-03-06 ENCOUNTER — OFFICE VISIT (OUTPATIENT)
Dept: CARDIOLOGY CLINIC | Age: 89
End: 2025-03-06
Payer: MEDICARE

## 2025-03-06 VITALS
HEART RATE: 57 BPM | BODY MASS INDEX: 19.2 KG/M2 | WEIGHT: 130 LBS | OXYGEN SATURATION: 97 % | SYSTOLIC BLOOD PRESSURE: 138 MMHG | DIASTOLIC BLOOD PRESSURE: 80 MMHG

## 2025-03-06 DIAGNOSIS — Z51.81 ANTICOAGULATION MANAGEMENT ENCOUNTER: ICD-10-CM

## 2025-03-06 DIAGNOSIS — R42 DIZZINESS: ICD-10-CM

## 2025-03-06 DIAGNOSIS — R60.0 LOWER EXTREMITY EDEMA: ICD-10-CM

## 2025-03-06 DIAGNOSIS — Z79.01 ANTICOAGULATION MANAGEMENT ENCOUNTER: ICD-10-CM

## 2025-03-06 DIAGNOSIS — I48.20 CHRONIC ATRIAL FIBRILLATION (HCC): ICD-10-CM

## 2025-03-06 DIAGNOSIS — I10 HYPERTENSION, UNSPECIFIED TYPE: Primary | ICD-10-CM

## 2025-03-06 PROCEDURE — 1036F TOBACCO NON-USER: CPT | Performed by: INTERNAL MEDICINE

## 2025-03-06 PROCEDURE — G8420 CALC BMI NORM PARAMETERS: HCPCS | Performed by: INTERNAL MEDICINE

## 2025-03-06 PROCEDURE — 99214 OFFICE O/P EST MOD 30 MIN: CPT | Performed by: INTERNAL MEDICINE

## 2025-03-06 PROCEDURE — 93000 ELECTROCARDIOGRAM COMPLETE: CPT | Performed by: INTERNAL MEDICINE

## 2025-03-06 PROCEDURE — 1159F MED LIST DOCD IN RCRD: CPT | Performed by: INTERNAL MEDICINE

## 2025-03-06 PROCEDURE — G2211 COMPLEX E/M VISIT ADD ON: HCPCS | Performed by: INTERNAL MEDICINE

## 2025-03-06 PROCEDURE — 1123F ACP DISCUSS/DSCN MKR DOCD: CPT | Performed by: INTERNAL MEDICINE

## 2025-03-06 PROCEDURE — G8427 DOCREV CUR MEDS BY ELIG CLIN: HCPCS | Performed by: INTERNAL MEDICINE

## 2025-03-06 RX ORDER — TORSEMIDE 10 MG/1
10 TABLET ORAL
Qty: 30 TABLET | Refills: 3 | Status: SHIPPED | OUTPATIENT
Start: 2025-03-07

## 2025-03-06 NOTE — PROGRESS NOTES
Cardiac Electrophysiology Consultation   Date: 3/6/2025   Reason for Consultation: Atrial fibrillation, SSS  Consult Requesting Physician: Philip Coronado MD     Chief Complaint:   Chief Complaint   Patient presents with    Follow-up     Dizzy, feet swollen, not eating and  falling all the time.         HPI: Ivan Almanza is a 89 y.o. patient with a history of atrial fibrillation, syncope s/p ILR, prostate cancer and hypertension. He was noted to have symptomatic sinus pauses in September 2021 on ILR check. He underwent implantation of a Medtronic dual chamber pacemaker on 10/14/2021 with Jr Biggs MD.  Hospitalized in 07/2023, 12/2023 & 06/2024 falls dizziness.    Interval History:   Today he is here with his wife in a wheelchair.  Patient's only complaint is lightheadedness and dizziness.  This appears to be a chronic problem.  His blood pressure and heart rate as well as EKG is normal.  He he has swelling in his feet.  He is not on any diuretics.  He is on Eliquis for chronic A-fib.  Wife claims that he has difficulty going to the bathroom but refused to use a urinal.  Otherwise he is ambulating in the house with using a walker.      Past Medical History:   Diagnosis Date    KIMI (acute kidney injury) 7/12/2023    Cancer (HCC)     Prostate    Hemorrhoids     Hyperlipidemia     Hypertension     Influenza A 12/29/2014    Shortness of breath       Past Surgical History:   Procedure Laterality Date    CARPAL TUNNEL RELEASE Right 09/01/15    Right carpal tunnel release      CARPAL TUNNEL RELEASE Left 9/22/15    COLONOSCOPY N/A 4/8/2019    COLONOSCOPY DIAGNOSTIC performed by Gus Cooney MD at [facility] ENDOSCOPY    CYST REMOVAL      right wrist    ENDOSCOPY, COLON, DIAGNOSTIC      HERNIA REPAIR Right 1946    inguinal    UPPER GASTROINTESTINAL ENDOSCOPY      with dilatation       Allergies:  No Known Allergies    Medication:   Prior to Admission medications    Medication Sig Start Date End Date

## 2025-03-13 ENCOUNTER — TELEPHONE (OUTPATIENT)
Dept: CARDIOLOGY CLINIC | Age: 89
End: 2025-03-13

## 2025-03-13 NOTE — TELEPHONE ENCOUNTER
Noted. They should definitely use wraps or compression stockings for swelling. Will continue to monitor BP.

## 2025-03-13 NOTE — TELEPHONE ENCOUNTER
Rimma from Palliative care is calling to update Dr. Rodriges about his blood pressure  at 160/90.  Patient has stopped taking his metoprolol due to being dizzy.  Saw Dr. Rodriges on 3/6/25 and started him on torsemide.  Rimma is also reporting edema low extremities.      Rimma is going to be faxing us a report  Any questions Rimma can be reached at [phone]

## 2025-03-13 NOTE — TELEPHONE ENCOUNTER
MA's   Reviewed progress note from palliative care scanned to encounter. As per any call to our office regarding elevated BP, we do not base anything on just one BP reading provided. Please call back and ask for a BP log to be faxed for our office for our review. The patient just saw Dr. Rodriges 3/6/25 and reported the same, unchanged chronic issues. Per EPIC review, he has not been on metoprolol since 7/2018 and is not listed anywhere in Dr. Rodriges's note. Please confirm. Per our med list, he is not currently on any antihypertensive therapy. Also per our vitals flowsheet, his BP average is about the same as it has been for the last handful of years for an advanced nonogenarian. Thanks.

## 2025-03-13 NOTE — TELEPHONE ENCOUNTER
Called Palliative Care spoke with Rimma.   States the nurse practitioner Rowan Casanova seen patient at him home on Tues. 3/11/25. She took his blood pressure just that day.   States will start a B/P log and will fax over a week of readings next week on Wed 3/19/25.    I advised Rimma that patient has not been on metoprolol since 2018, is not on any hypertensive medication. states understanding.    State patient takes torsemide 10 mg 3 X a week.  States not helping with his BLE edema- 2 + pitting.  States elevating, no stocking.    States NP office notes are in Care Everywhere from 3/11/25 visit with patient.     Please advise.

## 2025-03-17 ENCOUNTER — APPOINTMENT (OUTPATIENT)
Dept: CT IMAGING | Age: 89
DRG: 291 | End: 2025-03-17
Payer: MEDICARE

## 2025-03-17 ENCOUNTER — APPOINTMENT (OUTPATIENT)
Dept: GENERAL RADIOLOGY | Age: 89
DRG: 291 | End: 2025-03-17
Payer: MEDICARE

## 2025-03-17 ENCOUNTER — HOSPITAL ENCOUNTER (INPATIENT)
Age: 89
LOS: 3 days | Discharge: NURSING, REHAB OR LONG-TERM CARE | DRG: 291 | End: 2025-03-20
Attending: STUDENT IN AN ORGANIZED HEALTH CARE EDUCATION/TRAINING PROGRAM | Admitting: FAMILY MEDICINE
Payer: MEDICARE

## 2025-03-17 DIAGNOSIS — R60.0 PERIPHERAL EDEMA: ICD-10-CM

## 2025-03-17 DIAGNOSIS — R42 DIZZINESS: ICD-10-CM

## 2025-03-17 DIAGNOSIS — R06.02 SHORTNESS OF BREATH: ICD-10-CM

## 2025-03-17 DIAGNOSIS — I50.9 CONGESTIVE HEART FAILURE, UNSPECIFIED HF CHRONICITY, UNSPECIFIED HEART FAILURE TYPE (HCC): Primary | ICD-10-CM

## 2025-03-17 PROBLEM — I50.31 ACUTE DIASTOLIC CHF (CONGESTIVE HEART FAILURE) (HCC): Status: ACTIVE | Noted: 2025-03-17

## 2025-03-17 LAB
ALBUMIN SERPL-MCNC: 4 G/DL (ref 3.4–5)
ALBUMIN/GLOB SERPL: 1.3 {RATIO} (ref 1.1–2.2)
ALP SERPL-CCNC: 55 U/L (ref 40–129)
ALT SERPL-CCNC: 20 U/L (ref 10–40)
ANION GAP SERPL CALCULATED.3IONS-SCNC: 11 MMOL/L (ref 3–16)
AST SERPL-CCNC: 37 U/L (ref 15–37)
BASOPHILS # BLD: 0 K/UL (ref 0–0.2)
BASOPHILS NFR BLD: 1 %
BILIRUB SERPL-MCNC: 0.4 MG/DL (ref 0–1)
BILIRUB UR QL STRIP.AUTO: NEGATIVE
BUN SERPL-MCNC: 15 MG/DL (ref 7–20)
CALCIUM SERPL-MCNC: 9.4 MG/DL (ref 8.3–10.6)
CHLORIDE SERPL-SCNC: 102 MMOL/L (ref 99–110)
CLARITY UR: CLEAR
CO2 SERPL-SCNC: 25 MMOL/L (ref 21–32)
COLOR UR: YELLOW
CREAT SERPL-MCNC: 1 MG/DL (ref 0.8–1.3)
DEPRECATED RDW RBC AUTO: 15.6 % (ref 12.4–15.4)
EKG ATRIAL RATE: 82 BPM
EKG DIAGNOSIS: NORMAL
EKG P AXIS: 96 DEGREES
EKG P-R INTERVAL: 176 MS
EKG Q-T INTERVAL: 482 MS
EKG QRS DURATION: 168 MS
EKG QTC CALCULATION (BAZETT): 563 MS
EKG R AXIS: -66 DEGREES
EKG T AXIS: 98 DEGREES
EKG VENTRICULAR RATE: 82 BPM
EOSINOPHIL # BLD: 0 K/UL (ref 0–0.6)
EOSINOPHIL NFR BLD: 0.8 %
FERRITIN SERPL IA-MCNC: 141 NG/ML (ref 30–400)
FLUAV + FLUBV AG NOSE IA.RAPID: NOT DETECTED
FLUAV + FLUBV AG NOSE IA.RAPID: NOT DETECTED
GFR SERPLBLD CREATININE-BSD FMLA CKD-EPI: 68 ML/MIN/{1.73_M2}
GLUCOSE SERPL-MCNC: 118 MG/DL (ref 70–99)
GLUCOSE UR STRIP.AUTO-MCNC: NEGATIVE MG/DL
HCT VFR BLD AUTO: 38.5 % (ref 40.5–52.5)
HGB BLD-MCNC: 12.8 G/DL (ref 13.5–17.5)
HGB UR QL STRIP.AUTO: NEGATIVE
IRON SATN MFR SERPL: 26 % (ref 20–50)
IRON SERPL-MCNC: 79 UG/DL (ref 59–158)
KETONES UR STRIP.AUTO-MCNC: NEGATIVE MG/DL
LEUKOCYTE ESTERASE UR QL STRIP.AUTO: NEGATIVE
LYMPHOCYTES # BLD: 1.1 K/UL (ref 1–5.1)
LYMPHOCYTES NFR BLD: 26.9 %
MCH RBC QN AUTO: 32 PG (ref 26–34)
MCHC RBC AUTO-ENTMCNC: 33.2 G/DL (ref 31–36)
MCV RBC AUTO: 96.3 FL (ref 80–100)
MONOCYTES # BLD: 0.4 K/UL (ref 0–1.3)
MONOCYTES NFR BLD: 10.7 %
NEUTROPHILS # BLD: 2.5 K/UL (ref 1.7–7.7)
NEUTROPHILS NFR BLD: 60.6 %
NITRITE UR QL STRIP.AUTO: NEGATIVE
NT-PROBNP SERPL-MCNC: 6053 PG/ML (ref 0–449)
PH UR STRIP.AUTO: 8 [PH] (ref 5–8)
PLATELET # BLD AUTO: 150 K/UL (ref 135–450)
PMV BLD AUTO: 9.5 FL (ref 5–10.5)
POTASSIUM SERPL-SCNC: 4.6 MMOL/L (ref 3.5–5.1)
PROT SERPL-MCNC: 7.1 G/DL (ref 6.4–8.2)
PROT UR STRIP.AUTO-MCNC: NEGATIVE MG/DL
RBC # BLD AUTO: 4 M/UL (ref 4.2–5.9)
SARS-COV-2 RDRP RESP QL NAA+PROBE: NOT DETECTED
SODIUM SERPL-SCNC: 138 MMOL/L (ref 136–145)
SP GR UR STRIP.AUTO: 1.02 (ref 1–1.03)
TIBC SERPL-MCNC: 308 UG/DL (ref 260–445)
TROPONIN, HIGH SENSITIVITY: 35 NG/L (ref 0–22)
TROPONIN, HIGH SENSITIVITY: 50 NG/L (ref 0–22)
UA COMPLETE W REFLEX CULTURE PNL UR: NORMAL
UA DIPSTICK W REFLEX MICRO PNL UR: NORMAL
URN SPEC COLLECT METH UR: NORMAL
UROBILINOGEN UR STRIP-ACNC: 0.2 E.U./DL
WBC # BLD AUTO: 4.1 K/UL (ref 4–11)

## 2025-03-17 PROCEDURE — 6360000002 HC RX W HCPCS: Performed by: STUDENT IN AN ORGANIZED HEALTH CARE EDUCATION/TRAINING PROGRAM

## 2025-03-17 PROCEDURE — 6360000002 HC RX W HCPCS: Performed by: FAMILY MEDICINE

## 2025-03-17 PROCEDURE — 87635 SARS-COV-2 COVID-19 AMP PRB: CPT

## 2025-03-17 PROCEDURE — 51702 INSERT TEMP BLADDER CATH: CPT

## 2025-03-17 PROCEDURE — 83880 ASSAY OF NATRIURETIC PEPTIDE: CPT

## 2025-03-17 PROCEDURE — 70450 CT HEAD/BRAIN W/O DYE: CPT

## 2025-03-17 PROCEDURE — 2060000000 HC ICU INTERMEDIATE R&B

## 2025-03-17 PROCEDURE — 82728 ASSAY OF FERRITIN: CPT

## 2025-03-17 PROCEDURE — 81003 URINALYSIS AUTO W/O SCOPE: CPT

## 2025-03-17 PROCEDURE — 84484 ASSAY OF TROPONIN QUANT: CPT

## 2025-03-17 PROCEDURE — 83550 IRON BINDING TEST: CPT

## 2025-03-17 PROCEDURE — 93005 ELECTROCARDIOGRAM TRACING: CPT | Performed by: STUDENT IN AN ORGANIZED HEALTH CARE EDUCATION/TRAINING PROGRAM

## 2025-03-17 PROCEDURE — 96374 THER/PROPH/DIAG INJ IV PUSH: CPT

## 2025-03-17 PROCEDURE — 87502 INFLUENZA DNA AMP PROBE: CPT

## 2025-03-17 PROCEDURE — 6360000004 HC RX CONTRAST MEDICATION: Performed by: STUDENT IN AN ORGANIZED HEALTH CARE EDUCATION/TRAINING PROGRAM

## 2025-03-17 PROCEDURE — 71046 X-RAY EXAM CHEST 2 VIEWS: CPT

## 2025-03-17 PROCEDURE — 70496 CT ANGIOGRAPHY HEAD: CPT

## 2025-03-17 PROCEDURE — 1200000000 HC SEMI PRIVATE

## 2025-03-17 PROCEDURE — 80053 COMPREHEN METABOLIC PANEL: CPT

## 2025-03-17 PROCEDURE — 6370000000 HC RX 637 (ALT 250 FOR IP): Performed by: FAMILY MEDICINE

## 2025-03-17 PROCEDURE — 36415 COLL VENOUS BLD VENIPUNCTURE: CPT

## 2025-03-17 PROCEDURE — 93010 ELECTROCARDIOGRAM REPORT: CPT | Performed by: INTERNAL MEDICINE

## 2025-03-17 PROCEDURE — 85025 COMPLETE CBC W/AUTO DIFF WBC: CPT

## 2025-03-17 PROCEDURE — 83540 ASSAY OF IRON: CPT

## 2025-03-17 PROCEDURE — 2500000003 HC RX 250 WO HCPCS: Performed by: FAMILY MEDICINE

## 2025-03-17 PROCEDURE — 99285 EMERGENCY DEPT VISIT HI MDM: CPT

## 2025-03-17 RX ORDER — SODIUM CHLORIDE 9 MG/ML
INJECTION, SOLUTION INTRAVENOUS PRN
Status: DISCONTINUED | OUTPATIENT
Start: 2025-03-17 | End: 2025-03-20 | Stop reason: HOSPADM

## 2025-03-17 RX ORDER — LISINOPRIL 5 MG/1
5 TABLET ORAL DAILY
Status: DISCONTINUED | OUTPATIENT
Start: 2025-03-17 | End: 2025-03-20 | Stop reason: HOSPADM

## 2025-03-17 RX ORDER — POTASSIUM CHLORIDE 7.45 MG/ML
10 INJECTION INTRAVENOUS PRN
Status: DISCONTINUED | OUTPATIENT
Start: 2025-03-17 | End: 2025-03-20 | Stop reason: HOSPADM

## 2025-03-17 RX ORDER — FUROSEMIDE 10 MG/ML
40 INJECTION INTRAMUSCULAR; INTRAVENOUS ONCE
Status: COMPLETED | OUTPATIENT
Start: 2025-03-17 | End: 2025-03-17

## 2025-03-17 RX ORDER — FUROSEMIDE 10 MG/ML
40 INJECTION INTRAMUSCULAR; INTRAVENOUS 2 TIMES DAILY
Status: DISCONTINUED | OUTPATIENT
Start: 2025-03-17 | End: 2025-03-19

## 2025-03-17 RX ORDER — POTASSIUM CHLORIDE 1500 MG/1
40 TABLET, EXTENDED RELEASE ORAL PRN
Status: DISCONTINUED | OUTPATIENT
Start: 2025-03-17 | End: 2025-03-20 | Stop reason: HOSPADM

## 2025-03-17 RX ORDER — PROCHLORPERAZINE MALEATE 5 MG/1
5 TABLET ORAL EVERY 8 HOURS PRN
Status: DISCONTINUED | OUTPATIENT
Start: 2025-03-17 | End: 2025-03-20 | Stop reason: HOSPADM

## 2025-03-17 RX ORDER — ONDANSETRON 4 MG/1
4 TABLET, ORALLY DISINTEGRATING ORAL EVERY 8 HOURS PRN
Status: DISCONTINUED | OUTPATIENT
Start: 2025-03-17 | End: 2025-03-17

## 2025-03-17 RX ORDER — MAGNESIUM SULFATE IN WATER 40 MG/ML
2000 INJECTION, SOLUTION INTRAVENOUS PRN
Status: DISCONTINUED | OUTPATIENT
Start: 2025-03-17 | End: 2025-03-20 | Stop reason: HOSPADM

## 2025-03-17 RX ORDER — SODIUM CHLORIDE 0.9 % (FLUSH) 0.9 %
5-40 SYRINGE (ML) INJECTION PRN
Status: DISCONTINUED | OUTPATIENT
Start: 2025-03-17 | End: 2025-03-20 | Stop reason: HOSPADM

## 2025-03-17 RX ORDER — TAMSULOSIN HYDROCHLORIDE 0.4 MG/1
0.4 CAPSULE ORAL DAILY
Status: DISCONTINUED | OUTPATIENT
Start: 2025-03-17 | End: 2025-03-19

## 2025-03-17 RX ORDER — POLYETHYLENE GLYCOL 3350 17 G/17G
17 POWDER, FOR SOLUTION ORAL DAILY PRN
Status: DISCONTINUED | OUTPATIENT
Start: 2025-03-17 | End: 2025-03-20 | Stop reason: HOSPADM

## 2025-03-17 RX ORDER — ACETAMINOPHEN 325 MG/1
650 TABLET ORAL EVERY 6 HOURS PRN
Status: DISCONTINUED | OUTPATIENT
Start: 2025-03-17 | End: 2025-03-20 | Stop reason: HOSPADM

## 2025-03-17 RX ORDER — SODIUM CHLORIDE 0.9 % (FLUSH) 0.9 %
5-40 SYRINGE (ML) INJECTION EVERY 12 HOURS SCHEDULED
Status: DISCONTINUED | OUTPATIENT
Start: 2025-03-17 | End: 2025-03-20 | Stop reason: HOSPADM

## 2025-03-17 RX ORDER — ACETAMINOPHEN 650 MG/1
650 SUPPOSITORY RECTAL EVERY 6 HOURS PRN
Status: DISCONTINUED | OUTPATIENT
Start: 2025-03-17 | End: 2025-03-20 | Stop reason: HOSPADM

## 2025-03-17 RX ORDER — ABIRATERONE ACETATE 250 MG/1
500 TABLET ORAL 2 TIMES DAILY
COMMUNITY

## 2025-03-17 RX ORDER — ONDANSETRON 2 MG/ML
4 INJECTION INTRAMUSCULAR; INTRAVENOUS EVERY 6 HOURS PRN
Status: DISCONTINUED | OUTPATIENT
Start: 2025-03-17 | End: 2025-03-17

## 2025-03-17 RX ORDER — IOPAMIDOL 755 MG/ML
75 INJECTION, SOLUTION INTRAVASCULAR
Status: COMPLETED | OUTPATIENT
Start: 2025-03-17 | End: 2025-03-17

## 2025-03-17 RX ADMIN — FUROSEMIDE 40 MG: 10 INJECTION, SOLUTION INTRAMUSCULAR; INTRAVENOUS at 23:06

## 2025-03-17 RX ADMIN — LISINOPRIL 5 MG: 5 TABLET ORAL at 23:06

## 2025-03-17 RX ADMIN — FUROSEMIDE 40 MG: 10 INJECTION, SOLUTION INTRAMUSCULAR; INTRAVENOUS at 16:10

## 2025-03-17 RX ADMIN — TAMSULOSIN HYDROCHLORIDE 0.4 MG: 0.4 CAPSULE ORAL at 23:06

## 2025-03-17 RX ADMIN — SODIUM CHLORIDE, PRESERVATIVE FREE 10 ML: 5 INJECTION INTRAVENOUS at 23:38

## 2025-03-17 RX ADMIN — IOPAMIDOL 75 ML: 755 INJECTION, SOLUTION INTRAVENOUS at 14:46

## 2025-03-17 ASSESSMENT — PAIN SCALES - GENERAL
PAINLEVEL_OUTOF10: 0
PAINLEVEL_OUTOF10: 0

## 2025-03-17 NOTE — H&P
of left cardiac generator.Prominent interstitial markings and central vascular congestion with a moderate left and small right pleural effusion with associated bibasilar streaky opacities.No pneumothorax.The cardiomediastinal silhouette is obscured.No acute osseous abnormality.     Pulmonary edema with moderate left and small right pleural effusions.  There is suggestion that left pleural effusion may be loculated.         Electronically signed by Shelly Dickerson MD on 3/17/2025 at 4:40 PM

## 2025-03-17 NOTE — ED NOTES
MD notified of urinary retention. See new orders  
Pt attempted to urinate. Unable to provide urine sample at this time.  
Pt called out c/o lower abdominal pain. Lower abdomen firm and tender to patient. Bladder scan reveals 726mLs in bladder. MD made aware. Request for moore  
Stood up and attempted to void to give UA, not able to go at this time.  
None  Overdue Medications: None  Patient Belongings:    Additional Comments: Wife at bedside. Would like to stay overnight with patient.   If any further questions, please call Sending RN at [phone]      Admitting Unit Notification  Name of person notified and time:       Electronically signed by: Electronically signed by Wilda Cam RN on 3/17/2025 at 5:09 PM

## 2025-03-18 ENCOUNTER — APPOINTMENT (OUTPATIENT)
Age: 89
DRG: 291 | End: 2025-03-18
Attending: FAMILY MEDICINE
Payer: MEDICARE

## 2025-03-18 LAB
ALBUMIN SERPL-MCNC: 3.4 G/DL (ref 3.4–5)
ALP SERPL-CCNC: 47 U/L (ref 40–129)
ALT SERPL-CCNC: 18 U/L (ref 10–40)
ANION GAP SERPL CALCULATED.3IONS-SCNC: 10 MMOL/L (ref 3–16)
AST SERPL-CCNC: 28 U/L (ref 15–37)
BILIRUB DIRECT SERPL-MCNC: 0.2 MG/DL (ref 0–0.3)
BILIRUB INDIRECT SERPL-MCNC: 0.2 MG/DL (ref 0–1)
BILIRUB SERPL-MCNC: 0.4 MG/DL (ref 0–1)
BUN SERPL-MCNC: 15 MG/DL (ref 7–20)
CALCIUM SERPL-MCNC: 8.7 MG/DL (ref 8.3–10.6)
CHLORIDE SERPL-SCNC: 100 MMOL/L (ref 99–110)
CHOLEST SERPL-MCNC: 183 MG/DL (ref 0–199)
CO2 SERPL-SCNC: 29 MMOL/L (ref 21–32)
CREAT SERPL-MCNC: 0.9 MG/DL (ref 0.8–1.3)
ECHO AO ASC DIAM: 3 CM
ECHO AO ASCENDING AORTA INDEX: 1.8 CM/M2
ECHO AO ROOT DIAM: 3.1 CM
ECHO AO ROOT INDEX: 1.86 CM/M2
ECHO AV AREA PEAK VELOCITY: 2.1 CM2
ECHO AV AREA PLAN: 2 CM2
ECHO AV AREA PLAN: 2 CM2
ECHO AV AREA VTI: 1.8 CM2
ECHO AV AREA/BSA PEAK VELOCITY: 1.3 CM2/M2
ECHO AV AREA/BSA VTI: 1.1 CM2/M2
ECHO AV MEAN GRADIENT: 4 MMHG
ECHO AV MEAN VELOCITY: 1 M/S
ECHO AV PEAK GRADIENT: 6 MMHG
ECHO AV PEAK VELOCITY: 1.2 M/S
ECHO AV VELOCITY RATIO: 0.83
ECHO AV VTI: 25.4 CM
ECHO BSA: 1.65 M2
ECHO LA AREA 2C: 19.5 CM2
ECHO LA AREA 4C: 19.1 CM2
ECHO LA DIAMETER INDEX: 2.04 CM/M2
ECHO LA DIAMETER: 3.4 CM
ECHO LA MAJOR AXIS: 5.1 CM
ECHO LA MINOR AXIS: 5.5 CM
ECHO LA TO AORTIC ROOT RATIO: 1.1
ECHO LA VOL BP: 61 ML (ref 18–58)
ECHO LA VOL MOD A2C: 58 ML (ref 18–58)
ECHO LA VOL MOD A4C: 60 ML (ref 18–58)
ECHO LA VOL/BSA BIPLANE: 37 ML/M2 (ref 16–34)
ECHO LA VOLUME INDEX MOD A2C: 35 ML/M2 (ref 16–34)
ECHO LA VOLUME INDEX MOD A4C: 36 ML/M2 (ref 16–34)
ECHO LV E' LATERAL VELOCITY: 3.29 CM/S
ECHO LV E' SEPTAL VELOCITY: 5.82 CM/S
ECHO LV EF PHYSICIAN: 55 %
ECHO LV FRACTIONAL SHORTENING: 38 % (ref 28–44)
ECHO LV GLOBAL LONGITUDINAL STRAIN (GLS): -14.3 %
ECHO LV GLOBAL LONGITUDINAL STRAIN (GLS): -15.2 %
ECHO LV GLOBAL LONGITUDINAL STRAIN (GLS): -18.4 %
ECHO LV GLOBAL LONGITUDINAL STRAIN (GLS): -9.5 %
ECHO LV INTERNAL DIMENSION DIASTOLE INDEX: 2.04 CM/M2
ECHO LV INTERNAL DIMENSION DIASTOLIC: 3.4 CM (ref 4.2–5.9)
ECHO LV INTERNAL DIMENSION SYSTOLIC INDEX: 1.26 CM/M2
ECHO LV INTERNAL DIMENSION SYSTOLIC: 2.1 CM
ECHO LV IVSD: 1.9 CM (ref 0.6–1)
ECHO LV MASS 2D: 305 G (ref 88–224)
ECHO LV MASS INDEX 2D: 182.7 G/M2 (ref 49–115)
ECHO LV POSTERIOR WALL DIASTOLIC: 2.1 CM (ref 0.6–1)
ECHO LV RELATIVE WALL THICKNESS RATIO: 1.24
ECHO LVOT AREA: 2.5 CM2
ECHO LVOT AV VTI INDEX: 0.71
ECHO LVOT DIAM: 1.8 CM
ECHO LVOT MEAN GRADIENT: 2 MMHG
ECHO LVOT MEAN GRADIENT: 2 MMHG
ECHO LVOT PEAK GRADIENT: 4 MMHG
ECHO LVOT PEAK VELOCITY: 1 M/S
ECHO LVOT STROKE VOLUME INDEX: 27.6 ML/M2
ECHO LVOT SV: 46 ML
ECHO LVOT VTI: 18.1 CM
ECHO MV A VELOCITY: 0.53 M/S
ECHO MV E DECELERATION TIME (DT): 121 MS
ECHO MV E VELOCITY: 1.08 M/S
ECHO MV E/A RATIO: 2.04
ECHO MV E/E' LATERAL: 32.83
ECHO MV E/E' RATIO (AVERAGED): 25.69
ECHO MV E/E' SEPTAL: 18.56
ECHO PV MAX VELOCITY: 1 M/S
ECHO PV PEAK GRADIENT: 4 MMHG
ECHO RA AREA 4C: 9.7 CM2
ECHO RA END SYSTOLIC VOLUME APICAL 4 CHAMBER INDEX BSA: 13 ML/M2
ECHO RA VOLUME: 22 ML
ECHO RV BASAL DIMENSION: 5 CM
ECHO RV FREE WALL PEAK S': 10.4 CM/S
ECHO RV INTERNAL DIMENSION: 2.7 CM
ECHO RV TAPSE: 1.7 CM (ref 1.7–?)
ECHO TV REGURGITANT MAX VELOCITY: 2.32 M/S
ECHO TV REGURGITANT PEAK GRADIENT: 22 MMHG
GFR SERPLBLD CREATININE-BSD FMLA CKD-EPI: 77 ML/MIN/{1.73_M2}
GLUCOSE SERPL-MCNC: 106 MG/DL (ref 70–99)
HDLC SERPL-MCNC: 68 MG/DL (ref 40–60)
LDLC SERPL CALC-MCNC: 104 MG/DL
MAGNESIUM SERPL-MCNC: 2.02 MG/DL (ref 1.8–2.4)
POTASSIUM SERPL-SCNC: 3.7 MMOL/L (ref 3.5–5.1)
PROT SERPL-MCNC: 6 G/DL (ref 6.4–8.2)
SODIUM SERPL-SCNC: 139 MMOL/L (ref 136–145)
TRIGL SERPL-MCNC: 53 MG/DL (ref 0–150)
VLDLC SERPL CALC-MCNC: 11 MG/DL

## 2025-03-18 PROCEDURE — 97166 OT EVAL MOD COMPLEX 45 MIN: CPT

## 2025-03-18 PROCEDURE — 93356 MYOCRD STRAIN IMG SPCKL TRCK: CPT

## 2025-03-18 PROCEDURE — 6370000000 HC RX 637 (ALT 250 FOR IP): Performed by: INTERNAL MEDICINE

## 2025-03-18 PROCEDURE — 83735 ASSAY OF MAGNESIUM: CPT

## 2025-03-18 PROCEDURE — 99221 1ST HOSP IP/OBS SF/LOW 40: CPT | Performed by: INTERNAL MEDICINE

## 2025-03-18 PROCEDURE — 2500000003 HC RX 250 WO HCPCS: Performed by: FAMILY MEDICINE

## 2025-03-18 PROCEDURE — 1200000000 HC SEMI PRIVATE

## 2025-03-18 PROCEDURE — 6360000002 HC RX W HCPCS: Performed by: FAMILY MEDICINE

## 2025-03-18 PROCEDURE — 36415 COLL VENOUS BLD VENIPUNCTURE: CPT

## 2025-03-18 PROCEDURE — 6370000000 HC RX 637 (ALT 250 FOR IP): Performed by: FAMILY MEDICINE

## 2025-03-18 PROCEDURE — 93306 TTE W/DOPPLER COMPLETE: CPT | Performed by: INTERNAL MEDICINE

## 2025-03-18 PROCEDURE — 97162 PT EVAL MOD COMPLEX 30 MIN: CPT | Performed by: PHYSICAL THERAPIST

## 2025-03-18 PROCEDURE — 97530 THERAPEUTIC ACTIVITIES: CPT

## 2025-03-18 PROCEDURE — 94760 N-INVAS EAR/PLS OXIMETRY 1: CPT

## 2025-03-18 PROCEDURE — 80048 BASIC METABOLIC PNL TOTAL CA: CPT

## 2025-03-18 PROCEDURE — 80061 LIPID PANEL: CPT

## 2025-03-18 PROCEDURE — 80076 HEPATIC FUNCTION PANEL: CPT

## 2025-03-18 PROCEDURE — 97530 THERAPEUTIC ACTIVITIES: CPT | Performed by: PHYSICAL THERAPIST

## 2025-03-18 PROCEDURE — 93356 MYOCRD STRAIN IMG SPCKL TRCK: CPT | Performed by: INTERNAL MEDICINE

## 2025-03-18 RX ORDER — POTASSIUM CHLORIDE 750 MG/1
20 TABLET, EXTENDED RELEASE ORAL 2 TIMES DAILY
Status: DISCONTINUED | OUTPATIENT
Start: 2025-03-18 | End: 2025-03-20 | Stop reason: HOSPADM

## 2025-03-18 RX ADMIN — POTASSIUM CHLORIDE 20 MEQ: 750 TABLET, EXTENDED RELEASE ORAL at 13:17

## 2025-03-18 RX ADMIN — FUROSEMIDE 40 MG: 10 INJECTION, SOLUTION INTRAMUSCULAR; INTRAVENOUS at 09:46

## 2025-03-18 RX ADMIN — TAMSULOSIN HYDROCHLORIDE 0.4 MG: 0.4 CAPSULE ORAL at 09:46

## 2025-03-18 RX ADMIN — POTASSIUM CHLORIDE 20 MEQ: 750 TABLET, EXTENDED RELEASE ORAL at 20:29

## 2025-03-18 RX ADMIN — FUROSEMIDE 40 MG: 10 INJECTION, SOLUTION INTRAMUSCULAR; INTRAVENOUS at 17:50

## 2025-03-18 RX ADMIN — EMPAGLIFLOZIN 10 MG: 10 TABLET, FILM COATED ORAL at 13:17

## 2025-03-18 RX ADMIN — SODIUM CHLORIDE, PRESERVATIVE FREE 10 ML: 5 INJECTION INTRAVENOUS at 09:47

## 2025-03-18 RX ADMIN — SODIUM CHLORIDE, PRESERVATIVE FREE 10 ML: 5 INJECTION INTRAVENOUS at 20:30

## 2025-03-18 NOTE — DISCHARGE INSTR - COC
3/18/2025 1418  Last data filed at 3/18/2025 1307  Gross per 24 hour   Intake 480 ml   Output 1900 ml   Net -1420 ml     I/O last 3 completed shifts:  In: -   Out: 1400 [Urine:1400]    Safety Concerns:     At Risk for Falls    Impairments/Disabilities:      Hearing    Nutrition Therapy:  Current Nutrition Therapy:   - Oral Diet:  General    Routes of Feeding: Oral  Liquids: No Restrictions  Daily Fluid Restriction: no  Last Modified Barium Swallow with Video (Video Swallowing Test): not done    Treatments at the Time of Hospital Discharge:   Respiratory Treatments: n/a    Oxygen Therapy:  is not on home oxygen therapy.  Ventilator:    - No ventilator support    Rehab Therapies: Physical Therapy and Occupational Therapy  Weight Bearing Status/Restrictions: No weight bearing restrictions  Other Medical Equipment (for information only, NOT a DME order):  hospital bed  Other Treatments:     Heart Failure Instructions for Daily Management  Patient was treated for acute diastolic heart failure.  he  will require the following:    Please weigh daily on the same scale and approximately the same time of day.  Report weight gain of 3 pounds/day or 5 pounds/week to : janes TORRES, Philip Coronado [phone], and [facility] [phone].  Please use hospital discharge weight as baseline reference.   Please monitor for signs and symptoms of and report to MD:  Worsening Heart Failure: sudden weight gain, shortness of breath, lower extremity or general edema/swelling, abdominal bloating/swelling, inability to lie flat, intolerance to usual activity, or cough (especially at night). Report these finding even if no increase in weight.  Dehydration:  having difficulty or a decrease in urination, dizziness, worsening fatigue, or new onset/worsening of generalized weakness.     Please continue a LOW SODIUM diet and LIMIT fluid intake to 48 - 64 ounces ( 1.5 - 2 liters) per day.     Call Philip Coronado MD

## 2025-03-18 NOTE — CONSULTS
[facility]  Cardiology Consult Note        CC:      CHF             HPI:   This is a 89 y.o. male who came to the ER for leg swelling and dizziness and lower abdominal discomfort.  Bladder scan showed high residual in the bladder suggesting retention.  Patient started on Flomax and Murrell catheter placed.    Dizziness and lower extremity edema is chronic.    Had an echocardiogram done today which shows LVH with infiltrative disease possibly amyloid normal systolic function and diastolic dysfunction    Home medications include: Eliquis, and torsemide 3 times weekly  Past Medical History:   Diagnosis Date    KIMI (acute kidney injury) 2023    Cancer (HCC)     Prostate    Hemorrhoids     Hyperlipidemia     Hypertension     Influenza A 2014    Shortness of breath       Past Surgical History:   Procedure Laterality Date    CARPAL TUNNEL RELEASE Right 09/01/15    Right carpal tunnel release      CARPAL TUNNEL RELEASE Left 9/22/15    COLONOSCOPY N/A 2019    COLONOSCOPY DIAGNOSTIC performed by Gus Cooney MD at [facility] ENDOSCOPY    CYST REMOVAL      right wrist    ENDOSCOPY, COLON, DIAGNOSTIC      HERNIA REPAIR Right     inguinal    UPPER GASTROINTESTINAL ENDOSCOPY      with dilatation      Family History   Problem Relation Age of Onset    Hypertension Other     Stroke Other      Social History     Tobacco Use    Smoking status: Former     Current packs/day: 0.00     Types: Cigarettes     Quit date: 1955     Years since quittin.5    Smokeless tobacco: Never   Vaping Use    Vaping status: Never Used   Substance Use Topics    Alcohol use: No    Drug use: No      No Known Allergies        Intake/Output Summary (Last 24 hours) at 3/18/2025 1113  Last data filed at 3/18/2025 1000  Gross per 24 hour   Intake 240 ml   Output 1600 ml   Net -1360 ml       Physical Examination:    /65   Pulse 79   Temp 97.5 °F (36.4 °C)   Resp 15   Ht 1.727 m (5' 8\")   Wt 56.7 kg (125 lb)   SpO2

## 2025-03-18 NOTE — ED PROVIDER NOTES
Patient was given prescriptions for the following medications. I counseled the patient as to how to take these medications.  Current Discharge Medication List          Patient instructed to follow up with:   No follow-up provider specified.    All questions were answered and the patient/family expressed understanding and agreement with the plan.     I am the primary attending of record.     PROCEDURES   None      CRITICAL CARE  None    CLINICAL IMPRESSION  1. Congestive heart failure, unspecified HF chronicity, unspecified heart failure type (HCC)    2. Shortness of breath    3. Dizziness    4. Peripheral edema          DISPOSITION    Admitted 03/17/2025 04:34:31 PM     German Cary MD      Note: This chart was created using voice recognition dictation software. Efforts were made by me to ensure accuracy, however some errors may be present due to limitations of this technology and occasionally words are not transcribed correctly.       German Cary MD  03/18/25 1720     Statement Selected

## 2025-03-19 ENCOUNTER — TELEPHONE (OUTPATIENT)
Dept: CARDIOLOGY CLINIC | Age: 89
End: 2025-03-19

## 2025-03-19 LAB
ANION GAP SERPL CALCULATED.3IONS-SCNC: 9 MMOL/L (ref 3–16)
BUN SERPL-MCNC: 18 MG/DL (ref 7–20)
CALCIUM SERPL-MCNC: 8.6 MG/DL (ref 8.3–10.6)
CHLORIDE SERPL-SCNC: 97 MMOL/L (ref 99–110)
CO2 SERPL-SCNC: 30 MMOL/L (ref 21–32)
CREAT SERPL-MCNC: 1.1 MG/DL (ref 0.8–1.3)
GFR SERPLBLD CREATININE-BSD FMLA CKD-EPI: 60 ML/MIN/{1.73_M2}
GLUCOSE SERPL-MCNC: 101 MG/DL (ref 70–99)
MAGNESIUM SERPL-MCNC: 2.05 MG/DL (ref 1.8–2.4)
NT-PROBNP SERPL-MCNC: 2654 PG/ML (ref 0–449)
POTASSIUM SERPL-SCNC: 3.9 MMOL/L (ref 3.5–5.1)
SODIUM SERPL-SCNC: 136 MMOL/L (ref 136–145)

## 2025-03-19 PROCEDURE — 6370000000 HC RX 637 (ALT 250 FOR IP): Performed by: INTERNAL MEDICINE

## 2025-03-19 PROCEDURE — 80048 BASIC METABOLIC PNL TOTAL CA: CPT

## 2025-03-19 PROCEDURE — 6360000002 HC RX W HCPCS: Performed by: FAMILY MEDICINE

## 2025-03-19 PROCEDURE — 83735 ASSAY OF MAGNESIUM: CPT

## 2025-03-19 PROCEDURE — 97530 THERAPEUTIC ACTIVITIES: CPT | Performed by: PHYSICAL THERAPIST

## 2025-03-19 PROCEDURE — 2500000003 HC RX 250 WO HCPCS: Performed by: FAMILY MEDICINE

## 2025-03-19 PROCEDURE — 1200000000 HC SEMI PRIVATE

## 2025-03-19 PROCEDURE — 97530 THERAPEUTIC ACTIVITIES: CPT

## 2025-03-19 PROCEDURE — 83880 ASSAY OF NATRIURETIC PEPTIDE: CPT

## 2025-03-19 PROCEDURE — 36415 COLL VENOUS BLD VENIPUNCTURE: CPT

## 2025-03-19 PROCEDURE — 99233 SBSQ HOSP IP/OBS HIGH 50: CPT | Performed by: INTERNAL MEDICINE

## 2025-03-19 PROCEDURE — 6370000000 HC RX 637 (ALT 250 FOR IP): Performed by: FAMILY MEDICINE

## 2025-03-19 RX ORDER — MIDODRINE HYDROCHLORIDE 5 MG/1
2.5 TABLET ORAL
Status: DISCONTINUED | OUTPATIENT
Start: 2025-03-20 | End: 2025-03-20 | Stop reason: HOSPADM

## 2025-03-19 RX ADMIN — FUROSEMIDE 40 MG: 10 INJECTION, SOLUTION INTRAMUSCULAR; INTRAVENOUS at 08:05

## 2025-03-19 RX ADMIN — APIXABAN 2.5 MG: 2.5 TABLET, FILM COATED ORAL at 21:12

## 2025-03-19 RX ADMIN — SODIUM CHLORIDE, PRESERVATIVE FREE 10 ML: 5 INJECTION INTRAVENOUS at 21:15

## 2025-03-19 RX ADMIN — SODIUM CHLORIDE, PRESERVATIVE FREE 10 ML: 5 INJECTION INTRAVENOUS at 08:05

## 2025-03-19 RX ADMIN — POTASSIUM CHLORIDE 20 MEQ: 750 TABLET, EXTENDED RELEASE ORAL at 08:05

## 2025-03-19 RX ADMIN — POTASSIUM CHLORIDE 20 MEQ: 750 TABLET, EXTENDED RELEASE ORAL at 21:12

## 2025-03-19 RX ADMIN — TAMSULOSIN HYDROCHLORIDE 0.4 MG: 0.4 CAPSULE ORAL at 08:05

## 2025-03-19 RX ADMIN — EMPAGLIFLOZIN 10 MG: 10 TABLET, FILM COATED ORAL at 08:05

## 2025-03-19 ASSESSMENT — PAIN SCALES - GENERAL: PAINLEVEL_OUTOF10: 0

## 2025-03-19 NOTE — TELEPHONE ENCOUNTER
Called spoke with Brea spouse.   [facility] patient has tests done and just wanted to talk with Dr Rodriges to see what is going on with patient.     [facility] talk about patient going to rehab/nursing facility. Brea is confused on this and wants to discuss with Dr Rodriges.  [facility] hospital is pushing the nursing home.     [facility] patient had fluid build up and low blood pressure.  Brea would like to see if Dr Rodriges will come to hospital to see patient.    Wants to only talk with Dr Rodriges.     Please call patient at  [phone]

## 2025-03-19 NOTE — TELEPHONE ENCOUNTER
Brea (spouse) called and would like to talk to Dr. Rodriges. She didn't get a chance to see Antelmo when he rounded on Ivan yesterday and she wants an update. Brea states she has to go home now but will be returning to the hospital later, she has errands to run. Brea only has home phone and VM does not work. Brea did not relay what exactly she wanted to ask.     Please advise    Brea's callback: [phone]

## 2025-03-19 NOTE — CONSULTS
Consult received. Pt will be going to a skilled nursing facility at discharge where his care will be provided. There are instructions for CHF on the LILI  for the facility to follow. Will defer consult at this time. Continuing to follow.

## 2025-03-19 NOTE — PLAN OF CARE
Problem: Chronic Conditions and Co-morbidities  Goal: Patient's chronic conditions and co-morbidity symptoms are monitored and maintained or improved  3/19/2025 1123 by Colette Kramer RN  Outcome: Progressing  3/19/2025 0430 by Gabe Abdullahi RN  Outcome: Progressing     Problem: Discharge Planning  Goal: Discharge to home or other facility with appropriate resources  3/19/2025 1123 by Colette Kramer RN  Outcome: Progressing  3/19/2025 0430 by Gabe Abdullahi RN  Outcome: Progressing     Problem: Pain  Goal: Verbalizes/displays adequate comfort level or baseline comfort level  3/19/2025 1123 by Colette Kramer RN  Outcome: Progressing  3/19/2025 0430 by Gabe Abdullahi RN  Outcome: Progressing     Problem: Skin/Tissue Integrity  Goal: Skin integrity remains intact  Description: 1.  Monitor for areas of redness and/or skin breakdown  2.  Assess vascular access sites hourly  3.  Every 4-6 hours minimum:  Change oxygen saturation probe site  4.  Every 4-6 hours:  If on nasal continuous positive airway pressure, respiratory therapy assess nares and determine need for appliance change or resting period  3/19/2025 1123 by Colette Kramer RN  Outcome: Progressing  Flowsheets (Taken 3/19/2025 0739)  Skin Integrity Remains Intact: Monitor for areas of redness and/or skin breakdown  3/19/2025 0430 by Gabe Abdullahi RN  Outcome: Progressing     Problem: ABCDS Injury Assessment  Goal: Absence of physical injury  3/19/2025 1123 by Colette Kramer RN  Outcome: Progressing  3/19/2025 0430 by Gabe Abdullahi RN  Outcome: Progressing     Problem: Safety - Adult  Goal: Free from fall injury  3/19/2025 1123 by Colette Kramer RN  Outcome: Progressing  3/19/2025 0430 by Gabe Abdullahi RN  Outcome: Progressing

## 2025-03-20 VITALS
OXYGEN SATURATION: 98 % | DIASTOLIC BLOOD PRESSURE: 59 MMHG | HEART RATE: 69 BPM | SYSTOLIC BLOOD PRESSURE: 124 MMHG | TEMPERATURE: 97.9 F | RESPIRATION RATE: 16 BRPM | HEIGHT: 68 IN | WEIGHT: 121.25 LBS | BODY MASS INDEX: 18.38 KG/M2

## 2025-03-20 LAB
ANION GAP SERPL CALCULATED.3IONS-SCNC: 8 MMOL/L (ref 3–16)
BUN SERPL-MCNC: 16 MG/DL (ref 7–20)
CALCIUM SERPL-MCNC: 8 MG/DL (ref 8.3–10.6)
CHLORIDE SERPL-SCNC: 97 MMOL/L (ref 99–110)
CO2 SERPL-SCNC: 28 MMOL/L (ref 21–32)
CREAT SERPL-MCNC: 1 MG/DL (ref 0.8–1.3)
GFR SERPLBLD CREATININE-BSD FMLA CKD-EPI: 68 ML/MIN/{1.73_M2}
GLUCOSE SERPL-MCNC: 105 MG/DL (ref 70–99)
MAGNESIUM SERPL-MCNC: 1.92 MG/DL (ref 1.8–2.4)
POTASSIUM SERPL-SCNC: 4.1 MMOL/L (ref 3.5–5.1)
SODIUM SERPL-SCNC: 133 MMOL/L (ref 136–145)

## 2025-03-20 PROCEDURE — 83735 ASSAY OF MAGNESIUM: CPT

## 2025-03-20 PROCEDURE — 80048 BASIC METABOLIC PNL TOTAL CA: CPT

## 2025-03-20 PROCEDURE — 6370000000 HC RX 637 (ALT 250 FOR IP): Performed by: STUDENT IN AN ORGANIZED HEALTH CARE EDUCATION/TRAINING PROGRAM

## 2025-03-20 PROCEDURE — 6370000000 HC RX 637 (ALT 250 FOR IP): Performed by: INTERNAL MEDICINE

## 2025-03-20 PROCEDURE — 36415 COLL VENOUS BLD VENIPUNCTURE: CPT

## 2025-03-20 PROCEDURE — 94760 N-INVAS EAR/PLS OXIMETRY 1: CPT

## 2025-03-20 PROCEDURE — 2500000003 HC RX 250 WO HCPCS: Performed by: FAMILY MEDICINE

## 2025-03-20 RX ORDER — TAMSULOSIN HYDROCHLORIDE 0.4 MG/1
0.4 CAPSULE ORAL DAILY
Status: DISCONTINUED | OUTPATIENT
Start: 2025-03-20 | End: 2025-03-20 | Stop reason: HOSPADM

## 2025-03-20 RX ORDER — TAMSULOSIN HYDROCHLORIDE 0.4 MG/1
0.4 CAPSULE ORAL DAILY
DISCHARGE
Start: 2025-03-21

## 2025-03-20 RX ORDER — MIDODRINE HYDROCHLORIDE 2.5 MG/1
5 TABLET ORAL
DISCHARGE
Start: 2025-03-20

## 2025-03-20 RX ADMIN — APIXABAN 2.5 MG: 2.5 TABLET, FILM COATED ORAL at 09:00

## 2025-03-20 RX ADMIN — POTASSIUM CHLORIDE 20 MEQ: 750 TABLET, EXTENDED RELEASE ORAL at 08:59

## 2025-03-20 RX ADMIN — MIDODRINE HYDROCHLORIDE 2.5 MG: 5 TABLET ORAL at 08:59

## 2025-03-20 RX ADMIN — EMPAGLIFLOZIN 10 MG: 10 TABLET, FILM COATED ORAL at 09:00

## 2025-03-20 RX ADMIN — MIDODRINE HYDROCHLORIDE 2.5 MG: 5 TABLET ORAL at 12:40

## 2025-03-20 RX ADMIN — TAMSULOSIN HYDROCHLORIDE 0.4 MG: 0.4 CAPSULE ORAL at 09:45

## 2025-03-20 RX ADMIN — SODIUM CHLORIDE, PRESERVATIVE FREE 10 ML: 5 INJECTION INTRAVENOUS at 09:05

## 2025-03-20 NOTE — PLAN OF CARE
Problem: Chronic Conditions and Co-morbidities  Goal: Patient's chronic conditions and co-morbidity symptoms are monitored and maintained or improved  3/20/2025 1442 by Radha Burns RN  Outcome: Completed  3/20/2025 1017 by Radha Burns RN  Outcome: Progressing  Flowsheets (Taken 3/20/2025 0800)  Care Plan - Patient's Chronic Conditions and Co-Morbidity Symptoms are Monitored and Maintained or Improved:   Monitor and assess patient's chronic conditions and comorbid symptoms for stability, deterioration, or improvement   Collaborate with multidisciplinary team to address chronic and comorbid conditions and prevent exacerbation or deterioration   Update acute care plan with appropriate goals if chronic or comorbid symptoms are exacerbated and prevent overall improvement and discharge  3/20/2025 0252 by Ebony Wood RN  Outcome: Progressing     Problem: Discharge Planning  Goal: Discharge to home or other facility with appropriate resources  3/20/2025 1442 by Radha Burns, RN  Outcome: Completed  3/20/2025 1017 by Radha Burns RN  Outcome: Progressing  Flowsheets (Taken 3/20/2025 0800)  Discharge to home or other facility with appropriate resources:   Identify barriers to discharge with patient and caregiver   Arrange for needed discharge resources and transportation as appropriate   Identify discharge learning needs (meds, wound care, etc)  3/20/2025 0252 by Ebony Wood RN  Outcome: Progressing     Problem: Pain  Goal: Verbalizes/displays adequate comfort level or baseline comfort level  3/20/2025 1442 by Radha Burns, RN  Outcome: Completed  3/20/2025 1017 by Radha Burns RN  Outcome: Progressing  3/20/2025 0252 by Ebony Wood RN  Outcome: Progressing     Problem: Skin/Tissue Integrity  Goal: Skin integrity remains intact  Description: 1.  Monitor for areas of redness and/or skin breakdown  2.  Assess vascular access sites hourly  3.  Every 4-6 hours minimum:  Change

## 2025-03-20 NOTE — NURSE NAVIGATOR
Discharge order noted. There is a follow up appointment scheduled with    Dr. Fernandez 4/23/25 @1200noon  This is included on the LILI.  The facility MD will address his immediate discharge follow up concerns.  Bedside RN to review with patient when reviewing AVS.   DC wt 121 lbs bed wt  GDMT has been addressed.

## 2025-03-20 NOTE — PLAN OF CARE
Problem: Chronic Conditions and Co-morbidities  Goal: Patient's chronic conditions and co-morbidity symptoms are monitored and maintained or improved  3/20/2025 1017 by Radha Burns, RN  Outcome: Progressing  Flowsheets (Taken 3/20/2025 0800)  Care Plan - Patient's Chronic Conditions and Co-Morbidity Symptoms are Monitored and Maintained or Improved:   Monitor and assess patient's chronic conditions and comorbid symptoms for stability, deterioration, or improvement   Collaborate with multidisciplinary team to address chronic and comorbid conditions and prevent exacerbation or deterioration   Update acute care plan with appropriate goals if chronic or comorbid symptoms are exacerbated and prevent overall improvement and discharge  3/20/2025 0252 by Ebony Wood RN  Outcome: Progressing     Problem: Discharge Planning  Goal: Discharge to home or other facility with appropriate resources  3/20/2025 1017 by Radha Burns, RN  Outcome: Progressing  Flowsheets (Taken 3/20/2025 0800)  Discharge to home or other facility with appropriate resources:   Identify barriers to discharge with patient and caregiver   Arrange for needed discharge resources and transportation as appropriate   Identify discharge learning needs (meds, wound care, etc)  3/20/2025 0252 by Ebony Wood RN  Outcome: Progressing     Problem: Pain  Goal: Verbalizes/displays adequate comfort level or baseline comfort level  3/20/2025 1017 by Radha Burns, RN  Outcome: Progressing  3/20/2025 0252 by Ebony Wood RN  Outcome: Progressing     Problem: Skin/Tissue Integrity  Goal: Skin integrity remains intact  Description: 1.  Monitor for areas of redness and/or skin breakdown  2.  Assess vascular access sites hourly  3.  Every 4-6 hours minimum:  Change oxygen saturation probe site  4.  Every 4-6 hours:  If on nasal continuous positive airway pressure, respiratory therapy assess nares and determine need for appliance change or resting

## 2025-03-20 NOTE — ACP (ADVANCE CARE PLANNING)
Advance Care Planning   Healthcare Decision Maker:    Primary Decision Maker: Brea Almanza - Spouse - [phone]    Secondary Decision Maker: Lisbet Guevara - Friend - [phone]      Today we documented Decision Maker(s) consistent with ACP documents on file.     FELICE Trevizo  [phone]  Electronically signed by FELICE Lozoya on 3/20/2025 at 11:49 AM

## 2025-03-20 NOTE — CARE COORDINATION
reviewed IMM letter #2.       IMM Letter date given:: 03/20/25  IMM Letter time given:: 1220.   Patient and/or family/POA verbalized understanding of their medicare rights and appeal process if needed. Patient and/or family/POA has signed, initialed and placed the date and time on IMM letter #2 on the the appropriate lines. Copy of letter offered and they are aware that the original copy of IMM letter #2 is available prior to discharge from the paper chart on the unit.  Electronic documentation has been entered into epic for IMM letter #2 and original paper copy has been added to the paper chart at the nurses station.     Additional CM Notes: SW met with pt, pts spouse and pts god Lisbet rojas.  Transport arranged at 1500 with [facility] for [facility].  Fiordaliza with [facility] notified. HENS done.     The Plan for Transition of Care is related to the following treatment goals of Shortness of breath [R06.02]  Acute diastolic CHF (congestive heart failure) (HCC) [I50.31]  Congestive heart failure, unspecified HF chronicity, unspecified heart failure type (HCC) [I50.9]    The Patient and/or patient representative Ivan and his family were provided with a choice of provider and agrees with the discharge plan Yes    Freedom of choice list was provided with basic dialogue that supports the patient's individualized plan of care/goals and shares the quality data associated with the providers. Yes    FELICE Lozoya  [facility]   Case Management Department  Ph: [phone]  Fax: [phone]  Electronically signed by FELICE Lozoya on 3/20/2025 at 12:29 PM      
comment)  (D/c planning needs)   Discharge Planning   Type of Residence House  (Ranch home with basement.  Level entry)   Living Arrangements Spouse/Significant Other   Current Services Prior To Admission Durable Medical Equipment   Current DME Prior to Arrival Shower Chair;Other (Comment);Walker;Cane  (RTS, grbabars in the shower)   Potential Assistance Needed Skilled Nursing Facility   DME Ordered? No   Potential Assistance Purchasing Medications No   Type of Home Care Services None   Patient expects to be discharged to: Skilled nursing facility   One/Two Story Residence One story   History of falls? 0   Services At/After Discharge   Transition of Care Consult (CM Consult) N/A   Services At/After Discharge Skilled Nursing Facility (SNF)    Resource Information Provided? No   Mode of Transport at Discharge BLS   Condition of Participation: Discharge Planning   The Plan for Transition of Care is related to the following treatment goals: Strengthening   The Patient and/or Patient Representative was provided with a Choice of Provider? Patient;Patient Representative   Name of the Patient Representative who was provided with the Choice of Provider and agrees with the Discharge Plan?  Brea   The Patient and/Or Patient Representative agree with the Discharge Plan? Yes   Freedom of Choice list was provided with basic dialogue that supports the patient's individualized plan of care/goals, treatment preferences, and shares the quality data associated with the providers?  Yes

## 2025-03-20 NOTE — PROGRESS NOTES
V2.0  [facility] [place] [facility] Hospitalist Progress Note      Name:  Ivan Almanza /Age/Sex: 1926  (89 y.o. male)   MRN & CSN:  [id number] & [id number] Encounter Date/Time: 3/19/2025 10:30 AM EDT    Location:  [id number]-3236/5104-01 PCP: Philip Coronado MD       Hospital Day: 3    Assessment and Plan:   Ivan Almanza is a 89 y.o. male with CHF presenting with shortness      Plan:  Acute heart failure with reduced ejection fraction  -Discontinuing IV lasix.  -Cardiology consulted.  Note 3/19 reviewed: Treated with Jardiance, Lasix, spironolactone, lying flat comfortably.  Should be on torsemide 10 mg, spironolactone, and Jardiance at discharge  -strict I's and O's, daily weights, low-sodium diet  -Updated echocardiogram with infiltrative disease, possibly amyloid.  Normal systolic function, diastolic dysfunction  Paroxysmal atrial fibrillation  -Continue AC with Eliquis 2.5 mg daily  Urinary retention  -S/p Murrell catheter  -Initiated on tamsulosin  -Plan for voiding trial tomorrow.  Urology consult if patient fails  Orthostatic hypotension  -Persistently positive orthostats  -Discussed with cardiology 3/19: Do not think there is any contraindication using midodrine, but doubt that this will get better.  Cardio discussed with wife the patient may need permanent nursing home placement after skilled rehab  -DC tamsulosin as this would contribute further     Ppx: Lovenox  Dispo: SNF    Subjective:     Chief Complaint: Dizziness (Onset 1 week ago - states his head feels \"swimmy\".) and Leg Swelling (Was given short course of diuretics from his heart doctor but no decrease in swelling. States she didn't want to wait for an appointment to have office visit so figured they would come to the ER.)     Interval history:  Patient reports improved shortness of breath.  Continue intermittent lightheadedness, particularly when getting up.  Relatively asymptomatic at rest in bed.  Denies chest pain or abdominal pain    Review of 
  Physician Progress Note      PATIENT:               ISMA CHANG  [facility] #:                  [id number]  :                       1926  ADMIT DATE:       3/17/2025 12:32 PM  DISCH DATE:  RESPONDING  PROVIDER #:        Neri Villalba MD          QUERY TEXT:    Patient admitted with acute CHF, noted to have paroxysmal atrial fibrillation   and is maintained on Eliquis. If possible, please document in progress notes   and discharge summary if you are evaluating and/or treating any of the   following:?  ?  The medical record reflects the following:  Risk Factors: 89 yo, HTN, CHF  Clinical Indicators: paroxysmal atrial fibrillation  Treatment: Eliquis  Options provided:  -- Secondary hypercoagulable state in a patient with atrial fibrillation  -- Other - I will add my own diagnosis  -- Disagree - Not applicable / Not valid  -- Disagree - Clinically unable to determine / Unknown  -- Refer to Clinical Documentation Reviewer    PROVIDER RESPONSE TEXT:    This patient has secondary hypercoagulable state in a patient with atrial   fibrillation.    Query created by: Lisbet Morgan on 3/19/2025 4:26 AM      Electronically signed by:  Neri Villalba MD 3/19/2025 6:18 PM          
  Physician Progress Note      PATIENT:               ISMA CHANG  [facility] #:                  [id number]  :                       1926  ADMIT DATE:       3/17/2025 12:32 PM  DISCH DATE:  RESPONDING  PROVIDER #:        Neri Villalba MD          QUERY TEXT:    Patient admitted with acute diastolic CHF.  Noted to have poor PO intake prior   to admission.  Per dietician assessment on 3/19, meets criteria for severe   malnutrition.  If possible, please document in progress notes and discharge   summary if you are evaluating and /or treating any of the following:    The medical record reflects the following:  Risk Factors: poor PO intake prior to admission  Clinical Indicators: Per dietician assessment on 3/19, meets criteria for   severe malnutrition based on:  Body Fat Loss:  Moderate body fat loss Buccal region, Orbital  Muscle Mass Loss:  Moderate muscle mass loss Temples (temporalis)  Fluid Accumulation:  Mild Extremities  Treatment: dietician assessment, continue Na restricted regular diet,   dietician to monitor po intake for any needed changes    ASPEN Criteria:    https://aspenjournals.onlinelibrary.mccormack.com/doi/full/10.1177/421429929241272  5  Options provided:  -- Severe malnutrition  -- Other - I will add my own diagnosis  -- Disagree - Not applicable / Not valid  -- Disagree - Clinically unable to determine / Unknown  -- Refer to Clinical Documentation Reviewer    PROVIDER RESPONSE TEXT:    Severe malnutrition    Query created by: Lisbet Morgan on 3/20/2025 10:55 AM      Electronically signed by:  Neri Villalba MD 3/20/2025 3:28 PM          
4 Eyes Skin Assessment     NAME:  Ivan Almanza  YOB: 1926  MEDICAL RECORD NUMBER:  [id number]    The patient is being assessed for  Transfer to New Unit    I agree that at least one RN has performed a thorough Head to Toe Skin Assessment on the patient. ALL assessment sites listed below have been assessed.      Areas assessed by both nurses:    Head, Face, Ears, Shoulders, Back, Chest, Arms, Elbows, Hands, Sacrum. Buttock, Coccyx, Ischium, and Legs. Feet and Heels        Does the Patient have a Wound? No noted wound(s)       Sherman Prevention initiated by RN: Yes  Wound Care Orders initiated by RN: No    Pressure Injury (Stage 3,4, Unstageable, DTI, NWPT, and Complex wounds) if present, place Wound referral order by RN under : No    New Ostomies, if present place, Ostomy referral order under : No     Nurse 1 eSignature: Electronically signed by Ebony Wood RN on 3/20/25 at 1:34 AM EDT    **SHARE this note so that the co-signing nurse can place an eSignature**    Nurse 2 eSignature: Electronically signed by Lynn Mehta RN on 3/20/25 at 2:33 AM EDT   
Comprehensive Nutrition Assessment    Type and Reason for Visit:  Initial, Positive nutrition screen (MST of 2)    Nutrition Recommendations/Plan:   Continue regular; No added salt (3-4 gm); 1500 ml  Continue to monitor patients po intake for any needed changes     Malnutrition Assessment:  Malnutrition Status:  Severe malnutrition (03/19/25 1257)    Context:  Acute Illness     Findings of the 6 clinical characteristics of malnutrition:  Energy Intake:  No decrease in energy intake  Weight Loss:  No weight loss     Body Fat Loss:  Moderate body fat loss Buccal region, Orbital   Muscle Mass Loss:  Moderate muscle mass loss Temples (temporalis)  Fluid Accumulation:  Mild Extremities   Strength:  Not Performed    Nutrition Assessment:    Per progress notes patient presented to the ED due to 1 week of dizziness, SOB, and lower extremity swelling. Per notes patient reports receving diuretics from heart doctor but states its not working. Per notes patient has CHF (torsemide was being provided but discontinued). Patient is on a regular; No added salt (3-4 gm); 1500 ml. Consuming %. During assessment patients wife reports prior to admission patient was not eating because he was having difficulty with bowel. He felt that if he eat it would affect him from going to the bathroom even more. Wife also stated that since admitted his appetite is back. Wife unable to recall patients usual body weight but states patient is thin. Per weight history patient was around the 150s years ago and weight gradually went down onver the years. Patient is now 124lbs. From 03/17/2025-03/19/2025 patient lost 8lbs (6% lost in 2 days). Weight lost can be due to fluid excretion. Will continue to monitor patients po intake for any needed changes.    Nutrition Related Findings:    Labs (Glucose 101 H). Oriented x 4. BM on 03/18/2025. RLE/LLE edema Wound Type: None       Current Nutrition Intake & Therapies:    Average Meal Intake: %   
[facility]  Cardiology Consult Note        CC:      CHF             HPI:   This is a 89 y.o. male who came to the ER for leg swelling and dizziness and lower abdominal discomfort.  Bladder scan showed high residual in the bladder suggesting retention.  Patient started on Flomax and Murrell catheter placed.    Dizziness and lower extremity edema is chronic.    Had an echocardiogram done today which shows LVH with infiltrative disease possibly amyloid normal systolic function and diastolic dysfunction    Home medications include: Eliquis, and torsemide 3 times weekly    Interval history  Rx with IV lasix spironolactone and jardiance   Fluid Balance: -3 L  Patient laying in bed flat sleeping comfortably  Past Medical History:   Diagnosis Date    KIMI (acute kidney injury) 2023    Cancer (HCC)     Prostate    Hemorrhoids     Hyperlipidemia     Hypertension     Influenza A 2014    Shortness of breath       Past Surgical History:   Procedure Laterality Date    CARPAL TUNNEL RELEASE Right 09/01/15    Right carpal tunnel release      CARPAL TUNNEL RELEASE Left 9/22/15    COLONOSCOPY N/A 2019    COLONOSCOPY DIAGNOSTIC performed by Gus Cooney MD at [facility] ENDOSCOPY    CYST REMOVAL      right wrist    ENDOSCOPY, COLON, DIAGNOSTIC      HERNIA REPAIR Right     inguinal    UPPER GASTROINTESTINAL ENDOSCOPY      with dilatation      Family History   Problem Relation Age of Onset    Hypertension Other     Stroke Other      Social History     Tobacco Use    Smoking status: Former     Current packs/day: 0.00     Types: Cigarettes     Quit date: 1955     Years since quittin.5    Smokeless tobacco: Never   Vaping Use    Vaping status: Never Used   Substance Use Topics    Alcohol use: No    Drug use: No      No Known Allergies        Intake/Output Summary (Last 24 hours) at 3/19/2025 1433  Last data filed at 3/19/2025 1117  Gross per 24 hour   Intake 480 ml   Output 2450 ml   Net -1970 ml 
Medication Reconciliation    List of medications patient is currently taking is complete.     Source of information: 1. Conversation with patient at bedside                                      2. EPIC records      Allergies  Patient has no known allergies.     Notes regarding home medications:   1. Patient takes Torsemide three times weekly on MON/WED/FRI.  2. Patient did NOT take any of his home medications prior to arrival to the ER today.  3. Patient will provide his own supply of Zytiga if continued during his acute hospital admission.    John Mason [facility], PharmD, BCPS  3/17/2025 5:07 PM                
Occupational Therapy  Facility/Department: [facility] 5 PROGRESSIVE CARE  Occupational Therapy Daily Assessment    Name: Ivan Almanza  : 1926  MRN: [id number]  Date of Service: 3/19/2025    Discharge Recommendations:  Patient would benefit from continued therapy after discharge, 3-5 sessions per week  OT Equipment Recommendations  Other: defer to [place] facility     Ivan Almanza scored a  on the AM-PAC ADL Inpatient form. Current research shows that an AM-PAC score of 17 or less is typically not associated with a discharge to the patient's home setting. Based on the patient's AM-PAC score and their current ADL deficits, it is recommended that the patient have 3-5 sessions per week of Occupational Therapy at d/c to increase the patient's independence.  Please see assessment section for further patient specific details.    If patient discharges prior to next session this note will serve as a discharge summary.  Please see below for the latest assessment towards goals.      Patient Diagnosis(es): The primary encounter diagnosis was Congestive heart failure, unspecified HF chronicity, unspecified heart failure type (HCC). Diagnoses of Shortness of breath, Dizziness, and Peripheral edema were also pertinent to this visit.  Past Medical History:  has a past medical history of KIMI (acute kidney injury), Cancer (HCC), Hemorrhoids, Hyperlipidemia, Hypertension, Influenza A, and Shortness of breath.  Past Surgical History:  has a past surgical history that includes Endoscopy, colon, diagnostic; Upper gastrointestinal endoscopy; hernia repair (Right, 194); cyst removal; Carpal tunnel release (Right, 09/01/15); Carpal tunnel release (Left, 9/22/15); and Colonoscopy (N/A, 2019).           Assessment  Performance deficits / Impairments: Decreased functional mobility ;Decreased safe awareness;Decreased balance;Decreased ADL status;Decreased high-level IADLs;Decreased endurance;Decreased 
Patient in from PCU unit via stretcher, transferred to bed safely. Initial assessment done. Patient awake, follows commands but with impaired hearing. Initial VS taken and recorded, skin assessment done. Peripheral IV line noted on the right anterior cephalic vein on saline lock, dressing reinforced. Patient's wife at bedside. Patient kept warm and comfortable.  
Patients heart rate elevated in RVR in the 120-150's.  Metoprolol given per order.  Cardizem drip ordered but not infusing due to previous shifts told not to restart.  [facility] returned call to state let patient rest and see what metoprolol does for heart rate and they will be rounding this afternoon. Electronically signed by Valeria Diaz RN on 3/18/2025 at 10:17 AM    
Physical Therapy  Facility/Department: [address] PROGRESSIVE CARE  Physical Therapy Initial Assessment    Name: Ivan Almanza  : 1926  MRN: [id number]  Date of Service: 3/18/2025    Discharge Recommendations:  Subacute/Skilled Nursing Facility   PT Equipment Recommendations  Equipment Needed: No      Ivan Almanza scored a  on the AM-PAC short mobility form. Current research shows that an AM-PAC score of 17 or less is typically not associated with a discharge to the patient's home setting. Based on the patient's AM-PAC score and their current functional mobility deficits, it is recommended that the patient have 3-5 sessions per week of Physical Therapy at d/c to increase the patient's independence.  Please see assessment section for further patient specific details.    If patient discharges prior to next session this note will serve as a discharge summary.  Please see below for the latest assessment towards goals.      Patient Diagnosis(es): The primary encounter diagnosis was Congestive heart failure, unspecified HF chronicity, unspecified heart failure type (HCC). A diagnosis of Shortness of breath was also pertinent to this visit.  Past Medical History:  has a past medical history of KIMI (acute kidney injury), Cancer (HCC), Hemorrhoids, Hyperlipidemia, Hypertension, Influenza A, and Shortness of breath.  Past Surgical History:  has a past surgical history that includes Endoscopy, colon, diagnostic; Upper gastrointestinal endoscopy; hernia repair (Right, 194); cyst removal; Carpal tunnel release (Right, 09/01/15); Carpal tunnel release (Left, 9/22/15); and Colonoscopy (N/A, 2019).    Assessment  Body Structures, Functions, Activity Limitations Requiring Skilled Therapeutic Intervention: Decreased functional mobility ;Decreased strength;Decreased cognition;Decreased safe awareness;Decreased endurance  Assessment: Pt is a 89 yo male who was adm to [facility] with CHF and has had several falls 
Pt arrived to PCU 5104 from ED. CMU confirmed pt on the monitor. Pt resting in bed call within reach.  
Report given to [place] at [facility]  
Spiritual Health History and Assessment/Progress Note  [facility]    (P) Initial Encounter, Spiritual/Emotional Needs,  ,  ,      Name: Ivan Almanza MRN: [id number]    Age: 89 y.o.     Sex: male   Language: English   Lutheran: Synagogue   Acute diastolic CHF (congestive heart failure) (HCC)     Date: 3/19/2025            Total Time Calculated: (P) 7 min              Spiritual Assessment began in [facility] 5W PROGRESSIVE CARE        Referral/Consult From: (P) Nurse, Rounding   Encounter Overview/Reason: (P) Initial Encounter, Spiritual/Emotional Needs  Service Provided For: (P) Patient    Elida, Belief, Meaning:   Patient is connected with a elida tradition or spiritual practice and has beliefs or practices that help with coping during difficult times  Family/Friends No family/friends present      Importance and Influence:  Patient has spiritual/personal beliefs that influence decisions regarding their health  Family/Friends No family/friends present    Community:  Patient is connected with a spiritual community and feels well-supported. Support system includes: Spouse/Partner  Family/Friends No family/friends present    Assessment and Plan of Care:   Patient Interventions include: Facilitated expression of thoughts and feelings, Affirmed coping skills/support systems, and Other: Explored pts supports-Wife  Family/Friends Interventions include: No family/friends present    Patient Plan of Care: No spiritual needs identified for follow-up  Family/Friends Plan of Care: No family/friends present    Electronically signed by DEONTE Bernal on 3/19/2025 at 2:29 PM   
Vitals:    03/19/25 0839   BP: 130/69   Pulse: 78   Resp:    Temp:    SpO2:      Murrell removed. External cath placed. Patient was orthostatic with therapy today and was symptomatic per therapy. Patient ate breakfast. Wife left to run errands- would like a call from Dr. Rodriges. Wife understands patient will go to facility. Wife hoping for referral to [facility] or [facility]. Left note for Cw. Will monitor.   
      Physical Examination:    BP (!) 124/59   Pulse 69   Temp 97.9 °F (36.6 °C) (Oral)   Resp 16   Ht 1.727 m (5' 7.99\")   Wt 55 kg (121 lb 4.1 oz)   SpO2 98%   BMI 18.44 kg/m²        Labs:   Recent Labs     03/17/25  1343   WBC 4.1   HGB 12.8*   HCT 38.5*        Recent Labs     03/19/25  0426 03/20/25  0417    133*   K 3.9 4.1   CO2 30 28   BUN 18 16   CREATININE 1.1 1.0   GLUCOSE 101* 105*     No results for input(s): \"BNP\" in the last 72 hours.        EKG: Personally reviewed. 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  Has diastolic dysfunction  Has diuresed 4.2 L with the use of Jardiance   Laying flat comfortable  Going home on Jardiance and may be torsemide 10 mg 3 days a week on alternate days.      Postural Hypotension  I do not have any issues with using midodrine    Paroxysmal atrial fibrillation  Has a pacemaker  Rate is controlled  On low-dose Eliquis    Bladder retention  Although the patient is peeing, and concerned about recurrence of this issue once he is discharged  We will try tamsulosin (even though it can cause dizziness)  I think bladder retention is worse      MARYCRUZ Rodriges M.D  3/20/2025     
1030  Last data filed at 3/18/2025 0758  Gross per 24 hour   Intake --   Output 1600 ml   Net -1600 ml        Vitals:   Vitals:    03/18/25 1015   BP: 119/65   Pulse:    Resp:    Temp:    SpO2:        Physical Exam:     General: NAD  Eyes: EOMI  ENT: neck supple  Cardiovascular: Regular rate.  Respiratory: Basilar crackles  Gastrointestinal: Soft, non tender  Genitourinary: no suprapubic tenderness  Musculoskeletal: Trace lower extremity edema  Skin: warm, dry  Neuro: Alert.  Psych: Mood appropriate.     Medications:   Medications:    sodium chloride flush  5-40 mL IntraVENous 2 times per day    lisinopril  5 mg Oral Daily    furosemide  40 mg IntraVENous BID    tamsulosin  0.4 mg Oral Daily      Infusions:    sodium chloride       PRN Meds: sodium chloride flush, 5-40 mL, PRN  sodium chloride, , PRN  polyethylene glycol, 17 g, Daily PRN  acetaminophen, 650 mg, Q6H PRN   Or  acetaminophen, 650 mg, Q6H PRN  potassium chloride, 40 mEq, PRN   Or  potassium alternative oral replacement, 40 mEq, PRN   Or  potassium chloride, 10 mEq, PRN  magnesium sulfate, 2,000 mg, PRN  sulfur hexafluoride microspheres, 2 mL, ONCE PRN  prochlorperazine, 5 mg, Q8H PRN        Labs      Recent Results (from the past 24 hours)   CBC with Auto Differential    Collection Time: 03/17/25  1:43 PM   Result Value Ref Range    WBC 4.1 4.0 - 11.0 K/uL    RBC 4.00 (L) 4.20 - 5.90 M/uL    Hemoglobin 12.8 (L) 13.5 - 17.5 g/dL    Hematocrit 38.5 (L) 40.5 - 52.5 %    MCV 96.3 80.0 - 100.0 fL    MCH 32.0 26.0 - 34.0 pg    MCHC 33.2 31.0 - 36.0 g/dL    RDW 15.6 (H) 12.4 - 15.4 %    Platelets 150 135 - 450 K/uL    MPV 9.5 5.0 - 10.5 fL    Neutrophils % 60.6 %    Lymphocytes % 26.9 %    Monocytes % 10.7 %    Eosinophils % 0.8 %    Basophils % 1.0 %    Neutrophils Absolute 2.5 1.7 - 7.7 K/uL    Lymphocytes Absolute 1.1 1.0 - 5.1 K/uL    Monocytes Absolute 0.4 0.0 - 1.3 K/uL    Eosinophils Absolute 0.0 0.0 - 0.6 K/uL    Basophils Absolute 0.0 0.0 - 0.2 K/uL 
and has had several falls at home recently; Pt currently is (+) for orthostatic BP therefore session limited and pt returned to laying down in bed; pt is a high fall risk and not safe to return home at discharge; will benefit from continued therapy in SNF to address his medical issues as well as his deficits with mobility tasks  Therapy Prognosis: Fair  Decision Making: Medium Complexity  Barriers to Learning: [place], cog  Requires PT Follow-Up: Yes  Activity Tolerance  Activity Tolerance Comments: limited by low BP in sitting and standing    Plan  Physical Therapy Plan  General Plan: 3-5 times per week  Current Treatment Recommendations: Transfer training, Functional mobility training, Balance training, Strengthening, Gait training, Endurance training, Therapeutic activities  Safety Devices  Type of Devices: Left in bed, Call light within reach, Bed alarm in place, Nurse notified, Heels elevated for pressure relief    Restrictions  Restrictions/Precautions  Restrictions/Precautions: Fall Risk  Position Activity Restriction  Other Position/Activity Restrictions: very [place]     Subjective  General  Chart Reviewed: Yes  Patient assessed for rehabilitation services?: Yes  Additional Pertinent Hx: Ivan Almanza is a 89 y.o. male with a pmh of HTN, HLD, remote history cardiac pacemaker who presents with Acute diastolic CHF (congestive heart failure)  Response To Previous Treatment: Patient with no complaints from previous session.  Family/Caregiver Present: Yes (95 yo wife in room)  Referring Practitioner: Dr Dickerson  Referral Date : 03/17/25  Diagnosis: CHF  Follows Commands: Within Functional Limits  Subjective  Subjective: pt is agreeable to working with therapy         Social/Functional History  Social/Functional History  Lives With: Spouse (95 y/o)  Type of Home: House  Home Layout: One level, Laundry in basement (one story + basement)  Home Access: Level entry  Entrance Stairs - Number of Steps: level entry into 
mobility with min A in prep for ADL transfer  Short Term Goal 2: Pt will complete ADL transfer with min A  Short Term Goal 3: Pt will tolerate standing > 3 min for standing components of ADLs  Short Term Goal 4: Pt will complete toileting with min A  Short Term Goal 5: Pt will complete UE exercises in all planes to inc strength/endurance for ADLs  Patient Goals   Patient goals : no goal stated      Therapy Time   Individual Concurrent Group Co-treatment   Time In 0826         Time Out 0904         Minutes 38         Timed Code Treatment Minutes: 23 Minutes     This note to serve as OT d/c summary if pt is d/c-ed prior to next therapy session.    Migdalia Pham, OTR/L

## 2025-03-21 NOTE — DISCHARGE SUMMARY
V2.0  Discharge Summary    Name:  Ivan Almanza /Age/Sex: 1926 (89 y.o. male)   Admit Date: 3/17/2025  Discharge Date: 3/20/25    MRN & CSN:  [id number] & [id number] Encounter Date and Time 3/20/25 11:57 PM EDT    Attending:  No att. providers found Discharging Provider: Neri Villalba MD       Hospital Course:     Brief HPI: Ivan Almanza is a 89 y.o. male who presented with shortness of breath diagnosed with heart failure    Brief Problem Based Course:   Acute on chronic heart failure with preserved ejection fraction: Patient presented with symptoms shortness of breath, had elevated proBNP, congestion on imaging, and pitting edema bilateral lower extremities.  Cardiology was consulted.  Patient treated with IV diuretics.  Patient had improvement of lower extremity edema and respiratory symptoms, although LE edema did not fully resolve.  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  Patient was able to lie flat without symptoms prior to discharge.  Patient's home regimen was adjusted per cardiology  Orthostatic hypotension: Patient had severe postural hypertension.  This was not felt to be due to overdiuresis as patient did not appear to be volume depleted.  After discussion with cardiology patient was initiated on midodrine to try to help with patient's symptoms.  Cardiology notably thought that it was unlikely that patient's orthostasis could be fully reversed with treatment and with consideration to patient's age recommended therapy evaluation and consideration for SNF with possible transition into long-term care.  Patient advised on orthostatic safety precautions and was discharged to SNF for continued physical therapy  Urinary retention: Patient had significant urinary tension on initial presentation requiring Murrell catheter placement.  Patient was initiated on tamsulosin.  Patient subsequently passed voiding trial. With patient's

## 2025-04-14 ENCOUNTER — TELEPHONE (OUTPATIENT)
Dept: CARDIOLOGY CLINIC | Age: 89
End: 2025-04-14

## 2025-04-14 NOTE — TELEPHONE ENCOUNTER
Received call from patient's wife she is currently working on getting home care set up for patient through [facility].    She called Philip Coronado MD office and they reported they had not received any forms from [facility] to fill out for Home care.    She reports that her  continues to have symptoms of dizziness, which is not new for him.    She was unsure of what medications he is currently taking, stating that patient is managing his own medication.    Encouraged to follow up with PCP and keep scheduled follow up with Dr. Rodriges on 4/23,    Verbalized understanding.

## 2025-04-18 NOTE — PROGRESS NOTES
[facility]   Cardiology Note      Ivan Almanza   8/8/1926 89 y.o.      04/23/25       CC: \"I'm feeling ok\"  Philip Coronado MD       Problem List:  Atrial fibrillation  Syncope s/p ILR  Dizziness      HPI: Patient is a 89 y.o. male with a history of atrial fibrillation, syncope s/p ILR, prostate cancer and hypertension. In September of 2021, he was noted to have symptomatic sinus pauses. He had a [employer] dual chamber pacemaker implanted on 10/14/21 with Dr. Biggs. He was recently hospitalized from 3/17/25 until 3/20/25 with heart failure exacerbation and urinary retention.     Patient is here today for a follow-up visit. He is accompanied by his wife. He ambulates with a cane. He denies any shortness of breath, orthopnea, chest pain or swelling. He states that he always feels lightheaded or dizzy, but has not had any falls in the last couple of months. He was previously at a nursing home, but now lives at home with his wife. He has not been compliant with his medications. He states that he has been taking his torsemide only one day a week instead of three days a week.       Past Medical History:   Diagnosis Date    KIMI (acute kidney injury) 7/12/2023    Cancer (HCC)     Prostate    Hemorrhoids     Hyperlipidemia     Hypertension     Influenza A 12/29/2014    Shortness of breath      Past Surgical History:   Procedure Laterality Date    CARPAL TUNNEL RELEASE Right 09/01/15    Right carpal tunnel release      CARPAL TUNNEL RELEASE Left 9/22/15    COLONOSCOPY N/A 4/8/2019    COLONOSCOPY DIAGNOSTIC performed by Gus Cooney MD at [facility] ENDOSCOPY    CYST REMOVAL      right wrist    ENDOSCOPY, COLON, DIAGNOSTIC      HERNIA REPAIR Right 1946    inguinal    UPPER GASTROINTESTINAL ENDOSCOPY      with dilatation     Family History   Problem Relation Age of Onset    Hypertension Other     Stroke Other      Social History     Socioeconomic History    Marital status:      Spouse name: None

## 2025-04-23 ENCOUNTER — OFFICE VISIT (OUTPATIENT)
Dept: CARDIOLOGY CLINIC | Age: 89
End: 2025-04-23
Payer: MEDICARE

## 2025-04-23 VITALS
HEART RATE: 72 BPM | BODY MASS INDEX: 21.02 KG/M2 | WEIGHT: 138.2 LBS | OXYGEN SATURATION: 99 % | DIASTOLIC BLOOD PRESSURE: 60 MMHG | SYSTOLIC BLOOD PRESSURE: 138 MMHG

## 2025-04-23 DIAGNOSIS — I10 HYPERTENSION, UNSPECIFIED TYPE: Primary | ICD-10-CM

## 2025-04-23 DIAGNOSIS — I50.32 CHRONIC DIASTOLIC HEART FAILURE (HCC): ICD-10-CM

## 2025-04-23 DIAGNOSIS — Z95.0 STATUS POST BIVENTRICULAR PACEMAKER: ICD-10-CM

## 2025-04-23 DIAGNOSIS — I48.20 CHRONIC ATRIAL FIBRILLATION (HCC): ICD-10-CM

## 2025-04-23 PROCEDURE — G8420 CALC BMI NORM PARAMETERS: HCPCS | Performed by: INTERNAL MEDICINE

## 2025-04-23 PROCEDURE — 1036F TOBACCO NON-USER: CPT | Performed by: INTERNAL MEDICINE

## 2025-04-23 PROCEDURE — 1159F MED LIST DOCD IN RCRD: CPT | Performed by: INTERNAL MEDICINE

## 2025-04-23 PROCEDURE — G2211 COMPLEX E/M VISIT ADD ON: HCPCS | Performed by: INTERNAL MEDICINE

## 2025-04-23 PROCEDURE — G8427 DOCREV CUR MEDS BY ELIG CLIN: HCPCS | Performed by: INTERNAL MEDICINE

## 2025-04-23 PROCEDURE — 1123F ACP DISCUSS/DSCN MKR DOCD: CPT | Performed by: INTERNAL MEDICINE

## 2025-04-23 PROCEDURE — 99214 OFFICE O/P EST MOD 30 MIN: CPT | Performed by: INTERNAL MEDICINE

## 2025-04-23 PROCEDURE — 93000 ELECTROCARDIOGRAM COMPLETE: CPT | Performed by: INTERNAL MEDICINE

## 2025-04-23 RX ORDER — TORSEMIDE 10 MG/1
10 TABLET ORAL
Qty: 30 TABLET | Refills: 3 | Status: SHIPPED | OUTPATIENT
Start: 2025-04-23

## 2025-04-23 RX ORDER — MIDODRINE HYDROCHLORIDE 2.5 MG/1
2.5 TABLET ORAL
Qty: 90 TABLET | Refills: 5 | Status: SHIPPED | OUTPATIENT
Start: 2025-04-23

## 2025-04-23 NOTE — PATIENT INSTRUCTIONS
REMINDER    Take Torsemide 10 mg three days a week MW for swelling    Take Midodrine 2.5 mg three times daily for dizziness and to increase blood pressure    You are taking Jardiance for your heart not diabetes

## 2025-06-09 ENCOUNTER — OFFICE VISIT (OUTPATIENT)
Dept: CARDIOLOGY CLINIC | Age: 89
End: 2025-06-09
Payer: MEDICARE

## 2025-06-09 ENCOUNTER — TELEPHONE (OUTPATIENT)
Dept: CARDIOLOGY CLINIC | Age: 89
End: 2025-06-09

## 2025-06-09 VITALS
HEIGHT: 68 IN | HEART RATE: 69 BPM | SYSTOLIC BLOOD PRESSURE: 116 MMHG | DIASTOLIC BLOOD PRESSURE: 74 MMHG | OXYGEN SATURATION: 98 % | WEIGHT: 130.2 LBS | BODY MASS INDEX: 19.73 KG/M2

## 2025-06-09 DIAGNOSIS — I48.0 PAROXYSMAL ATRIAL FIBRILLATION (HCC): Primary | ICD-10-CM

## 2025-06-09 DIAGNOSIS — I10 PRIMARY HYPERTENSION: ICD-10-CM

## 2025-06-09 PROCEDURE — 1036F TOBACCO NON-USER: CPT | Performed by: INTERNAL MEDICINE

## 2025-06-09 PROCEDURE — G8428 CUR MEDS NOT DOCUMENT: HCPCS | Performed by: INTERNAL MEDICINE

## 2025-06-09 PROCEDURE — 99214 OFFICE O/P EST MOD 30 MIN: CPT | Performed by: INTERNAL MEDICINE

## 2025-06-09 PROCEDURE — 93000 ELECTROCARDIOGRAM COMPLETE: CPT | Performed by: INTERNAL MEDICINE

## 2025-06-09 PROCEDURE — G8420 CALC BMI NORM PARAMETERS: HCPCS | Performed by: INTERNAL MEDICINE

## 2025-06-09 PROCEDURE — G2211 COMPLEX E/M VISIT ADD ON: HCPCS | Performed by: INTERNAL MEDICINE

## 2025-06-09 PROCEDURE — 1123F ACP DISCUSS/DSCN MKR DOCD: CPT | Performed by: INTERNAL MEDICINE

## 2025-06-09 NOTE — TELEPHONE ENCOUNTER
Brea spouse calling states patient's pacemake is making a ringing sound. Started this past weekend. States last for seconds and will come and go.     I advised patient his device shouldn't make sounds. If he could send a transmission in.   [facility] patient's care taker Lisbet is with patient and was able to send a transmission in on patient.     Spoke with Vickie Sotelo [facility] transmission was received. Report send.   SCANNED IN CHART.    Patient scheduled to see Dr. Rodriges today and get a peace of mind that everything is okay.

## 2025-06-09 NOTE — PATIENT INSTRUCTIONS
Take torsemide 10 mg three days a week MW    Take midodrine 2.5 mg three times a day    [place] on [facility]: [phone] or [phone], Option 2.

## 2025-06-09 NOTE — PROGRESS NOTES
[place] [address]  Ph: [phone]  Fax: [phone]    This note was scribed in the presence of Dr. MARYCRUZ Rodriges MD by Angela Richey RN     Physician Attestation:  The scribes documentation has been prepared under my direction and personally reviewed by me in its entirety. I confirm that the note above accurately reflects all work, treatment, procedures, and medical decision making performed by me.    06/09/25

## 2025-08-15 ENCOUNTER — APPOINTMENT (OUTPATIENT)
Dept: CT IMAGING | Age: 89
DRG: 312 | End: 2025-08-15
Payer: MEDICARE

## 2025-08-15 ENCOUNTER — HOSPITAL ENCOUNTER (INPATIENT)
Age: 89
LOS: 1 days | Discharge: HOME HEALTH | DRG: 312 | End: 2025-08-17
Attending: EMERGENCY MEDICINE | Admitting: INTERNAL MEDICINE
Payer: MEDICARE

## 2025-08-15 ENCOUNTER — APPOINTMENT (OUTPATIENT)
Dept: GENERAL RADIOLOGY | Age: 89
DRG: 312 | End: 2025-08-15
Payer: MEDICARE

## 2025-08-15 DIAGNOSIS — I95.9 HYPOTENSION, UNSPECIFIED HYPOTENSION TYPE: ICD-10-CM

## 2025-08-15 DIAGNOSIS — I50.9 ACUTE CONGESTIVE HEART FAILURE, UNSPECIFIED HEART FAILURE TYPE (HCC): ICD-10-CM

## 2025-08-15 DIAGNOSIS — R79.89 ELEVATED TROPONIN: ICD-10-CM

## 2025-08-15 DIAGNOSIS — R55 NEAR SYNCOPE: Primary | ICD-10-CM

## 2025-08-15 LAB
ALBUMIN SERPL-MCNC: 3.6 G/DL (ref 3.4–5)
ALBUMIN/GLOB SERPL: 1.3 {RATIO} (ref 1.1–2.2)
ALP SERPL-CCNC: 62 U/L (ref 40–129)
ALT SERPL-CCNC: 13 U/L (ref 10–40)
ANION GAP SERPL CALCULATED.3IONS-SCNC: 10 MMOL/L (ref 3–16)
AST SERPL-CCNC: 27 U/L (ref 15–37)
BACTERIA URNS QL MICRO: NORMAL /HPF
BILIRUB SERPL-MCNC: <0.2 MG/DL (ref 0–1)
BILIRUB UR QL STRIP.AUTO: NEGATIVE
BUN SERPL-MCNC: 18 MG/DL (ref 7–20)
CALCIUM SERPL-MCNC: 9.1 MG/DL (ref 8.3–10.6)
CHLORIDE SERPL-SCNC: 105 MMOL/L (ref 99–110)
CLARITY UR: ABNORMAL
CO2 SERPL-SCNC: 25 MMOL/L (ref 21–32)
COLOR UR: YELLOW
CREAT SERPL-MCNC: 1.5 MG/DL (ref 0.8–1.3)
DEPRECATED RDW RBC AUTO: 15.2 % (ref 12.4–15.4)
EPI CELLS #/AREA URNS AUTO: 2 /HPF (ref 0–5)
GFR SERPLBLD CREATININE-BSD FMLA CKD-EPI: 42 ML/MIN/{1.73_M2}
GLUCOSE SERPL-MCNC: 123 MG/DL (ref 70–99)
GLUCOSE UR STRIP.AUTO-MCNC: NEGATIVE MG/DL
HCT VFR BLD AUTO: 29.6 % (ref 40.5–52.5)
HEMOCCULT STL QL: NORMAL
HGB BLD-MCNC: 9.7 G/DL (ref 13.5–17.5)
HGB UR QL STRIP.AUTO: NEGATIVE
HYALINE CASTS #/AREA URNS AUTO: 1 /LPF (ref 0–8)
KETONES UR STRIP.AUTO-MCNC: NEGATIVE MG/DL
LACTATE BLDV-SCNC: 1.7 MMOL/L (ref 0.4–1.9)
LEUKOCYTE ESTERASE UR QL STRIP.AUTO: NEGATIVE
MCH RBC QN AUTO: 30.8 PG (ref 26–34)
MCHC RBC AUTO-ENTMCNC: 32.8 G/DL (ref 31–36)
MCV RBC AUTO: 93.7 FL (ref 80–100)
NITRITE UR QL STRIP.AUTO: NEGATIVE
NT-PROBNP SERPL-MCNC: 4340 PG/ML (ref 0–449)
PH UR STRIP.AUTO: 7.5 [PH] (ref 5–8)
PLATELET # BLD AUTO: 177 K/UL (ref 135–450)
PMV BLD AUTO: 8.8 FL (ref 5–10.5)
POTASSIUM SERPL-SCNC: 4.5 MMOL/L (ref 3.5–5.1)
PROT SERPL-MCNC: 6.4 G/DL (ref 6.4–8.2)
PROT UR STRIP.AUTO-MCNC: ABNORMAL MG/DL
RBC # BLD AUTO: 3.16 M/UL (ref 4.2–5.9)
RBC CLUMPS #/AREA URNS AUTO: 1 /HPF (ref 0–4)
SODIUM SERPL-SCNC: 140 MMOL/L (ref 136–145)
SP GR UR STRIP.AUTO: 1.01 (ref 1–1.03)
TROPONIN, HIGH SENSITIVITY: 49 NG/L (ref 0–22)
TROPONIN, HIGH SENSITIVITY: 51 NG/L (ref 0–22)
UA COMPLETE W REFLEX CULTURE PNL UR: ABNORMAL
UA DIPSTICK W REFLEX MICRO PNL UR: YES
URN SPEC COLLECT METH UR: ABNORMAL
UROBILINOGEN UR STRIP-ACNC: 0.2 E.U./DL
WBC # BLD AUTO: 5.6 K/UL (ref 4–11)
WBC #/AREA URNS AUTO: 0 /HPF (ref 0–5)

## 2025-08-15 PROCEDURE — 99285 EMERGENCY DEPT VISIT HI MDM: CPT

## 2025-08-15 PROCEDURE — 85027 COMPLETE CBC AUTOMATED: CPT

## 2025-08-15 PROCEDURE — 82270 OCCULT BLOOD FECES: CPT

## 2025-08-15 PROCEDURE — 71045 X-RAY EXAM CHEST 1 VIEW: CPT

## 2025-08-15 PROCEDURE — 83880 ASSAY OF NATRIURETIC PEPTIDE: CPT

## 2025-08-15 PROCEDURE — 83605 ASSAY OF LACTIC ACID: CPT

## 2025-08-15 PROCEDURE — 70450 CT HEAD/BRAIN W/O DYE: CPT

## 2025-08-15 PROCEDURE — 80053 COMPREHEN METABOLIC PANEL: CPT

## 2025-08-15 PROCEDURE — G0378 HOSPITAL OBSERVATION PER HR: HCPCS

## 2025-08-15 PROCEDURE — 36415 COLL VENOUS BLD VENIPUNCTURE: CPT

## 2025-08-15 PROCEDURE — 93005 ELECTROCARDIOGRAM TRACING: CPT | Performed by: EMERGENCY MEDICINE

## 2025-08-15 PROCEDURE — 81001 URINALYSIS AUTO W/SCOPE: CPT

## 2025-08-15 PROCEDURE — 84484 ASSAY OF TROPONIN QUANT: CPT

## 2025-08-15 RX ORDER — SODIUM PHOSPHATE, DIBASIC AND SODIUM PHOSPHATE, MONOBASIC 7; 19 G/230ML; G/230ML
1 ENEMA RECTAL
Status: DISCONTINUED | OUTPATIENT
Start: 2025-08-15 | End: 2025-08-17 | Stop reason: HOSPADM

## 2025-08-15 RX ORDER — POLYETHYLENE GLYCOL 3350 17 G/17G
17 POWDER, FOR SOLUTION ORAL DAILY PRN
Status: DISCONTINUED | OUTPATIENT
Start: 2025-08-15 | End: 2025-08-17 | Stop reason: HOSPADM

## 2025-08-15 RX ORDER — MAGNESIUM SULFATE IN WATER 40 MG/ML
2000 INJECTION, SOLUTION INTRAVENOUS PRN
Status: DISCONTINUED | OUTPATIENT
Start: 2025-08-15 | End: 2025-08-17 | Stop reason: HOSPADM

## 2025-08-15 RX ORDER — SODIUM CHLORIDE 0.9 % (FLUSH) 0.9 %
5-40 SYRINGE (ML) INJECTION EVERY 12 HOURS SCHEDULED
Status: DISCONTINUED | OUTPATIENT
Start: 2025-08-15 | End: 2025-08-17 | Stop reason: HOSPADM

## 2025-08-15 RX ORDER — TORSEMIDE 20 MG/1
10 TABLET ORAL
Status: DISCONTINUED | OUTPATIENT
Start: 2025-08-15 | End: 2025-08-17 | Stop reason: HOSPADM

## 2025-08-15 RX ORDER — ACETAMINOPHEN 650 MG/1
650 SUPPOSITORY RECTAL EVERY 6 HOURS PRN
Status: DISCONTINUED | OUTPATIENT
Start: 2025-08-15 | End: 2025-08-17 | Stop reason: HOSPADM

## 2025-08-15 RX ORDER — SODIUM CHLORIDE 9 MG/ML
INJECTION, SOLUTION INTRAVENOUS PRN
Status: DISCONTINUED | OUTPATIENT
Start: 2025-08-15 | End: 2025-08-17 | Stop reason: HOSPADM

## 2025-08-15 RX ORDER — ACETAMINOPHEN 325 MG/1
650 TABLET ORAL EVERY 6 HOURS PRN
Status: DISCONTINUED | OUTPATIENT
Start: 2025-08-15 | End: 2025-08-17 | Stop reason: HOSPADM

## 2025-08-15 RX ORDER — SODIUM CHLORIDE 9 MG/ML
INJECTION, SOLUTION INTRAVENOUS CONTINUOUS
Status: DISCONTINUED | OUTPATIENT
Start: 2025-08-15 | End: 2025-08-16

## 2025-08-15 RX ORDER — MECLIZINE HCL 12.5 MG 12.5 MG/1
12.5 TABLET ORAL DAILY PRN
COMMUNITY

## 2025-08-15 RX ORDER — SODIUM CHLORIDE 0.9 % (FLUSH) 0.9 %
5-40 SYRINGE (ML) INJECTION PRN
Status: DISCONTINUED | OUTPATIENT
Start: 2025-08-15 | End: 2025-08-17 | Stop reason: HOSPADM

## 2025-08-15 RX ORDER — ONDANSETRON 2 MG/ML
4 INJECTION INTRAMUSCULAR; INTRAVENOUS EVERY 6 HOURS PRN
Status: DISCONTINUED | OUTPATIENT
Start: 2025-08-15 | End: 2025-08-15

## 2025-08-15 RX ORDER — MIDODRINE HYDROCHLORIDE 5 MG/1
2.5 TABLET ORAL
Status: DISCONTINUED | OUTPATIENT
Start: 2025-08-16 | End: 2025-08-16

## 2025-08-15 RX ORDER — TRIAMCINOLONE ACETONIDE 55 UG/1
2 SPRAY, METERED NASAL DAILY
COMMUNITY

## 2025-08-15 RX ORDER — POTASSIUM CHLORIDE 7.45 MG/ML
10 INJECTION INTRAVENOUS PRN
Status: DISCONTINUED | OUTPATIENT
Start: 2025-08-15 | End: 2025-08-17 | Stop reason: HOSPADM

## 2025-08-15 RX ORDER — ONDANSETRON 4 MG/1
4 TABLET, ORALLY DISINTEGRATING ORAL EVERY 8 HOURS PRN
Status: DISCONTINUED | OUTPATIENT
Start: 2025-08-15 | End: 2025-08-15

## 2025-08-15 RX ORDER — TAMSULOSIN HYDROCHLORIDE 0.4 MG/1
0.4 CAPSULE ORAL DAILY
Status: DISCONTINUED | OUTPATIENT
Start: 2025-08-16 | End: 2025-08-17 | Stop reason: HOSPADM

## 2025-08-15 ASSESSMENT — PAIN SCALES - GENERAL: PAINLEVEL_OUTOF10: 0

## 2025-08-16 PROBLEM — I95.9 HYPOTENSION: Status: ACTIVE | Noted: 2025-08-16

## 2025-08-16 LAB
ALBUMIN SERPL-MCNC: 3.3 G/DL (ref 3.4–5)
ANION GAP SERPL CALCULATED.3IONS-SCNC: 8 MMOL/L (ref 3–16)
BASOPHILS # BLD: 0.1 K/UL (ref 0–0.2)
BASOPHILS NFR BLD: 1.5 %
BUN SERPL-MCNC: 18 MG/DL (ref 7–20)
CALCIUM SERPL-MCNC: 9.3 MG/DL (ref 8.3–10.6)
CHLORIDE SERPL-SCNC: 106 MMOL/L (ref 99–110)
CO2 SERPL-SCNC: 25 MMOL/L (ref 21–32)
CORTIS AM PEAK SERPL-MCNC: 2.2 UG/DL (ref 4.3–22.4)
CREAT SERPL-MCNC: 1.2 MG/DL (ref 0.8–1.3)
DEPRECATED RDW RBC AUTO: 15 % (ref 12.4–15.4)
EKG ATRIAL RATE: 76 BPM
EKG DIAGNOSIS: NORMAL
EKG P-R INTERVAL: 176 MS
EKG Q-T INTERVAL: 500 MS
EKG QRS DURATION: 188 MS
EKG QTC CALCULATION (BAZETT): 562 MS
EKG R AXIS: -60 DEGREES
EKG T AXIS: 101 DEGREES
EKG VENTRICULAR RATE: 76 BPM
EOSINOPHIL # BLD: 0.2 K/UL (ref 0–0.6)
EOSINOPHIL NFR BLD: 3.8 %
GFR SERPLBLD CREATININE-BSD FMLA CKD-EPI: 54 ML/MIN/{1.73_M2}
GLUCOSE SERPL-MCNC: 95 MG/DL (ref 70–99)
HCT VFR BLD AUTO: 29.3 % (ref 40.5–52.5)
HGB BLD-MCNC: 9.8 G/DL (ref 13.5–17.5)
LYMPHOCYTES # BLD: 1.9 K/UL (ref 1–5.1)
LYMPHOCYTES NFR BLD: 38.9 %
MCH RBC QN AUTO: 30.7 PG (ref 26–34)
MCHC RBC AUTO-ENTMCNC: 33.5 G/DL (ref 31–36)
MCV RBC AUTO: 91.7 FL (ref 80–100)
MONOCYTES # BLD: 0.6 K/UL (ref 0–1.3)
MONOCYTES NFR BLD: 11.4 %
NEUTROPHILS # BLD: 2.2 K/UL (ref 1.7–7.7)
NEUTROPHILS NFR BLD: 44.4 %
PHOSPHATE SERPL-MCNC: 4.5 MG/DL (ref 2.5–4.9)
PLATELET # BLD AUTO: 167 K/UL (ref 135–450)
PMV BLD AUTO: 8.6 FL (ref 5–10.5)
POTASSIUM SERPL-SCNC: 4.4 MMOL/L (ref 3.5–5.1)
RBC # BLD AUTO: 3.2 M/UL (ref 4.2–5.9)
SODIUM SERPL-SCNC: 139 MMOL/L (ref 136–145)
TSH SERPL DL<=0.005 MIU/L-ACNC: 2.08 UIU/ML (ref 0.27–4.2)
WBC # BLD AUTO: 4.9 K/UL (ref 4–11)

## 2025-08-16 PROCEDURE — 85025 COMPLETE CBC W/AUTO DIFF WBC: CPT

## 2025-08-16 PROCEDURE — 36415 COLL VENOUS BLD VENIPUNCTURE: CPT

## 2025-08-16 PROCEDURE — 97166 OT EVAL MOD COMPLEX 45 MIN: CPT

## 2025-08-16 PROCEDURE — 6370000000 HC RX 637 (ALT 250 FOR IP): Performed by: INTERNAL MEDICINE

## 2025-08-16 PROCEDURE — 97162 PT EVAL MOD COMPLEX 30 MIN: CPT

## 2025-08-16 PROCEDURE — 84443 ASSAY THYROID STIM HORMONE: CPT

## 2025-08-16 PROCEDURE — 82533 TOTAL CORTISOL: CPT

## 2025-08-16 PROCEDURE — 97116 GAIT TRAINING THERAPY: CPT

## 2025-08-16 PROCEDURE — 99223 1ST HOSP IP/OBS HIGH 75: CPT | Performed by: INTERNAL MEDICINE

## 2025-08-16 PROCEDURE — 93010 ELECTROCARDIOGRAM REPORT: CPT | Performed by: INTERNAL MEDICINE

## 2025-08-16 PROCEDURE — 2500000003 HC RX 250 WO HCPCS: Performed by: INTERNAL MEDICINE

## 2025-08-16 PROCEDURE — 97530 THERAPEUTIC ACTIVITIES: CPT

## 2025-08-16 PROCEDURE — 80069 RENAL FUNCTION PANEL: CPT

## 2025-08-16 PROCEDURE — 94760 N-INVAS EAR/PLS OXIMETRY 1: CPT

## 2025-08-16 PROCEDURE — 2060000000 HC ICU INTERMEDIATE R&B

## 2025-08-16 RX ORDER — MIDODRINE HYDROCHLORIDE 5 MG/1
5 TABLET ORAL
Status: DISCONTINUED | OUTPATIENT
Start: 2025-08-16 | End: 2025-08-17 | Stop reason: HOSPADM

## 2025-08-16 RX ADMIN — TAMSULOSIN HYDROCHLORIDE 0.4 MG: 0.4 CAPSULE ORAL at 08:11

## 2025-08-16 RX ADMIN — MIDODRINE HYDROCHLORIDE 5 MG: 5 TABLET ORAL at 13:06

## 2025-08-16 RX ADMIN — APIXABAN 2.5 MG: 2.5 TABLET, FILM COATED ORAL at 21:24

## 2025-08-16 RX ADMIN — APIXABAN 2.5 MG: 2.5 TABLET, FILM COATED ORAL at 08:11

## 2025-08-16 RX ADMIN — Medication 10 ML: at 21:24

## 2025-08-16 RX ADMIN — MIDODRINE HYDROCHLORIDE 5 MG: 5 TABLET ORAL at 18:28

## 2025-08-16 RX ADMIN — EMPAGLIFLOZIN 10 MG: 10 TABLET, FILM COATED ORAL at 08:11

## 2025-08-16 RX ADMIN — Medication 3 MG: at 21:24

## 2025-08-16 RX ADMIN — Medication 10 ML: at 08:11

## 2025-08-16 ASSESSMENT — PAIN SCALES - GENERAL
PAINLEVEL_OUTOF10: 0
PAINLEVEL_OUTOF10: 0

## 2025-08-17 VITALS
BODY MASS INDEX: 19.76 KG/M2 | WEIGHT: 133.38 LBS | HEIGHT: 69 IN | HEART RATE: 71 BPM | DIASTOLIC BLOOD PRESSURE: 55 MMHG | RESPIRATION RATE: 18 BRPM | OXYGEN SATURATION: 98 % | TEMPERATURE: 98.1 F | SYSTOLIC BLOOD PRESSURE: 130 MMHG

## 2025-08-17 PROCEDURE — 6370000000 HC RX 637 (ALT 250 FOR IP): Performed by: INTERNAL MEDICINE

## 2025-08-17 PROCEDURE — 2500000003 HC RX 250 WO HCPCS: Performed by: INTERNAL MEDICINE

## 2025-08-17 PROCEDURE — 99232 SBSQ HOSP IP/OBS MODERATE 35: CPT

## 2025-08-17 PROCEDURE — 94760 N-INVAS EAR/PLS OXIMETRY 1: CPT

## 2025-08-17 RX ORDER — MIDODRINE HYDROCHLORIDE 5 MG/1
5 TABLET ORAL
Qty: 90 TABLET | Refills: 3 | Status: SHIPPED | OUTPATIENT
Start: 2025-08-17

## 2025-08-17 RX ADMIN — TAMSULOSIN HYDROCHLORIDE 0.4 MG: 0.4 CAPSULE ORAL at 08:36

## 2025-08-17 RX ADMIN — APIXABAN 2.5 MG: 2.5 TABLET, FILM COATED ORAL at 08:36

## 2025-08-17 RX ADMIN — Medication 10 ML: at 08:36

## 2025-08-17 RX ADMIN — MIDODRINE HYDROCHLORIDE 5 MG: 5 TABLET ORAL at 08:36

## 2025-08-17 RX ADMIN — MIDODRINE HYDROCHLORIDE 5 MG: 5 TABLET ORAL at 13:28

## 2025-08-17 RX ADMIN — EMPAGLIFLOZIN 10 MG: 10 TABLET, FILM COATED ORAL at 08:36

## 2025-08-17 ASSESSMENT — PAIN SCALES - GENERAL: PAINLEVEL_OUTOF10: 0

## 2025-08-27 ENCOUNTER — TELEPHONE (OUTPATIENT)
Dept: CARDIOLOGY CLINIC | Age: 89
End: 2025-08-27

## 2025-08-28 ENCOUNTER — OFFICE VISIT (OUTPATIENT)
Dept: CARDIOLOGY CLINIC | Age: 89
End: 2025-08-28
Payer: MEDICARE

## 2025-08-28 VITALS
OXYGEN SATURATION: 96 % | HEIGHT: 69 IN | WEIGHT: 130 LBS | HEART RATE: 75 BPM | BODY MASS INDEX: 19.26 KG/M2 | SYSTOLIC BLOOD PRESSURE: 120 MMHG | DIASTOLIC BLOOD PRESSURE: 58 MMHG

## 2025-08-28 DIAGNOSIS — I95.1 ORTHOSTATIC HYPOTENSION: ICD-10-CM

## 2025-08-28 DIAGNOSIS — I48.0 PAROXYSMAL ATRIAL FIBRILLATION (HCC): Primary | ICD-10-CM

## 2025-08-28 DIAGNOSIS — I49.5 SSS (SICK SINUS SYNDROME) (HCC): ICD-10-CM

## 2025-08-28 DIAGNOSIS — Z95.0 STATUS POST BIVENTRICULAR PACEMAKER: ICD-10-CM

## 2025-08-28 DIAGNOSIS — R60.0 LOWER EXTREMITY EDEMA: ICD-10-CM

## 2025-08-28 DIAGNOSIS — I50.32 CHRONIC DIASTOLIC HEART FAILURE (HCC): ICD-10-CM

## 2025-08-28 PROCEDURE — 1124F ACP DISCUSS-NO DSCNMKR DOCD: CPT | Performed by: INTERNAL MEDICINE

## 2025-08-28 PROCEDURE — 1111F DSCHRG MED/CURRENT MED MERGE: CPT | Performed by: INTERNAL MEDICINE

## 2025-08-28 PROCEDURE — G8420 CALC BMI NORM PARAMETERS: HCPCS | Performed by: INTERNAL MEDICINE

## 2025-08-28 PROCEDURE — G8427 DOCREV CUR MEDS BY ELIG CLIN: HCPCS | Performed by: INTERNAL MEDICINE

## 2025-08-28 PROCEDURE — 1036F TOBACCO NON-USER: CPT | Performed by: INTERNAL MEDICINE

## 2025-08-28 PROCEDURE — 93000 ELECTROCARDIOGRAM COMPLETE: CPT | Performed by: INTERNAL MEDICINE

## 2025-08-28 PROCEDURE — 1159F MED LIST DOCD IN RCRD: CPT | Performed by: INTERNAL MEDICINE

## 2025-08-28 PROCEDURE — 99214 OFFICE O/P EST MOD 30 MIN: CPT | Performed by: INTERNAL MEDICINE

## (undated) DEVICE — ENDOSCOPY KIT: Brand: MEDLINE INDUSTRIES, INC.